# Patient Record
Sex: FEMALE | Race: BLACK OR AFRICAN AMERICAN | NOT HISPANIC OR LATINO | Employment: UNEMPLOYED | URBAN - METROPOLITAN AREA
[De-identification: names, ages, dates, MRNs, and addresses within clinical notes are randomized per-mention and may not be internally consistent; named-entity substitution may affect disease eponyms.]

---

## 2023-10-02 ENCOUNTER — HOSPITAL ENCOUNTER (EMERGENCY)
Facility: HOSPITAL | Age: 63
Discharge: HOME | End: 2023-10-02
Payer: COMMERCIAL

## 2023-10-02 ENCOUNTER — APPOINTMENT (OUTPATIENT)
Dept: RADIOLOGY | Facility: HOSPITAL | Age: 63
End: 2023-10-02
Payer: COMMERCIAL

## 2023-10-02 VITALS
RESPIRATION RATE: 16 BRPM | DIASTOLIC BLOOD PRESSURE: 95 MMHG | HEIGHT: 64 IN | WEIGHT: 178 LBS | TEMPERATURE: 98.2 F | SYSTOLIC BLOOD PRESSURE: 145 MMHG | HEART RATE: 63 BPM | BODY MASS INDEX: 30.39 KG/M2

## 2023-10-02 DIAGNOSIS — J06.9 VIRAL URI: Primary | ICD-10-CM

## 2023-10-02 LAB
APPEARANCE UR: CLEAR
BILIRUB UR STRIP.AUTO-MCNC: NEGATIVE MG/DL
COLOR UR: YELLOW
FLUAV RNA RESP QL NAA+PROBE: NOT DETECTED
FLUBV RNA RESP QL NAA+PROBE: NOT DETECTED
GLUCOSE UR STRIP.AUTO-MCNC: NEGATIVE MG/DL
KETONES UR STRIP.AUTO-MCNC: NEGATIVE MG/DL
LEUKOCYTE ESTERASE UR QL STRIP.AUTO: NEGATIVE
MUCOUS THREADS #/AREA URNS AUTO: NORMAL /LPF
NITRITE UR QL STRIP.AUTO: NEGATIVE
PH UR STRIP.AUTO: 5 [PH]
PROT UR STRIP.AUTO-MCNC: NEGATIVE MG/DL
RBC # UR STRIP.AUTO: ABNORMAL /UL
RBC #/AREA URNS AUTO: NORMAL /HPF
S PYO DNA THROAT QL NAA+PROBE: NOT DETECTED
SARS-COV-2 ORF1AB RESP QL NAA+PROBE: NOT DETECTED
SP GR UR STRIP.AUTO: 1.01
SQUAMOUS #/AREA URNS AUTO: NORMAL /HPF
UROBILINOGEN UR STRIP.AUTO-MCNC: <2 MG/DL
WBC #/AREA URNS AUTO: NORMAL /HPF

## 2023-10-02 PROCEDURE — 71046 X-RAY EXAM CHEST 2 VIEWS: CPT | Performed by: RADIOLOGY

## 2023-10-02 PROCEDURE — 2500000004 HC RX 250 GENERAL PHARMACY W/ HCPCS (ALT 636 FOR OP/ED): Mod: SE | Performed by: PHYSICIAN ASSISTANT

## 2023-10-02 PROCEDURE — 81001 URINALYSIS AUTO W/SCOPE: CPT | Performed by: EMERGENCY MEDICINE

## 2023-10-02 PROCEDURE — 99284 EMERGENCY DEPT VISIT MOD MDM: CPT

## 2023-10-02 PROCEDURE — 71046 X-RAY EXAM CHEST 2 VIEWS: CPT

## 2023-10-02 PROCEDURE — 99283 EMERGENCY DEPT VISIT LOW MDM: CPT | Mod: 25

## 2023-10-02 PROCEDURE — 87651 STREP A DNA AMP PROBE: CPT | Performed by: EMERGENCY MEDICINE

## 2023-10-02 PROCEDURE — 87635 SARS-COV-2 COVID-19 AMP PRB: CPT | Performed by: EMERGENCY MEDICINE

## 2023-10-02 PROCEDURE — 96372 THER/PROPH/DIAG INJ SC/IM: CPT

## 2023-10-02 RX ORDER — KETOROLAC TROMETHAMINE 15 MG/ML
15 INJECTION, SOLUTION INTRAMUSCULAR; INTRAVENOUS ONCE
Status: COMPLETED | OUTPATIENT
Start: 2023-10-02 | End: 2023-10-02

## 2023-10-02 RX ADMIN — KETOROLAC TROMETHAMINE 15 MG: 15 INJECTION, SOLUTION INTRAMUSCULAR; INTRAVENOUS at 14:07

## 2023-10-02 ASSESSMENT — PAIN SCALES - GENERAL: PAINLEVEL_OUTOF10: 7

## 2023-10-02 ASSESSMENT — PAIN - FUNCTIONAL ASSESSMENT: PAIN_FUNCTIONAL_ASSESSMENT: 0-10

## 2023-10-02 ASSESSMENT — COLUMBIA-SUICIDE SEVERITY RATING SCALE - C-SSRS
2. HAVE YOU ACTUALLY HAD ANY THOUGHTS OF KILLING YOURSELF?: NO
1. IN THE PAST MONTH, HAVE YOU WISHED YOU WERE DEAD OR WISHED YOU COULD GO TO SLEEP AND NOT WAKE UP?: NO
6. HAVE YOU EVER DONE ANYTHING, STARTED TO DO ANYTHING, OR PREPARED TO DO ANYTHING TO END YOUR LIFE?: NO

## 2023-10-02 NOTE — ED PROVIDER NOTES
I received Ny Martinez in signout from ED IVA.  Please see the previous note for all HPI, PE and MDM up to the time of signout at 1700.    In brief Ny Martinez is an 63 y.o. female presenting for Flulike symptoms for approximately 3 days.  She endorsed dry cough, fatigue, fever, chills, body aches headache and sore throat.  At the time of signout we were awaiting: Urinalysis. Urine was negative for acute infection. Gave patient's instructions To rest well, drink plenty of fluids and treat this as a viral URI.  Also instructed her to follow-up with PCP if symptoms do not improve within the next few days.    Please see previous providers note for full history and physical.   Chief Complaint   Patient presents with    Flu Symptoms     Reports cough, fatigue, fever       Results for orders placed or performed during the hospital encounter of 10/02/23 (from the past 24 hour(s))   Group A Streptococcus, PCR    Specimen: Throat/Pharynx; Swab   Result Value Ref Range    Group A Strep PCR Not Detected Not Detected   SARS-CoV-2 RT PCR   Result Value Ref Range    Coronavirus 2019, PCR Not Detected Not Detected   Influenza A, and B PCR   Result Value Ref Range    Flu A Result Not Detected Not Detected    Flu B Result Not Detected Not Detected   Urinalysis with Reflex Microscopic   Result Value Ref Range    Color, Urine Yellow Straw, Yellow    Appearance, Urine Clear Clear    Specific Gravity, Urine 1.014 1.005 - 1.035    pH, Urine 5.0 5.0, 5.5, 6.0, 6.5, 7.0, 7.5, 8.0    Protein, Urine NEGATIVE NEGATIVE mg/dL    Glucose, Urine NEGATIVE NEGATIVE mg/dL    Blood, Urine SMALL (1+) (A) NEGATIVE    Ketones, Urine NEGATIVE NEGATIVE mg/dL    Bilirubin, Urine NEGATIVE NEGATIVE    Urobilinogen, Urine <2.0 <2.0 mg/dL    Nitrite, Urine NEGATIVE NEGATIVE    Leukocyte Esterase, Urine NEGATIVE NEGATIVE   Urinalysis Microscopic Only   Result Value Ref Range    WBC, Urine NONE 1-5, NONE /HPF    RBC, Urine 1-2 NONE, 1-2, 3-5 /HPF     Squamous Epithelial Cells, Urine NONE Reference range not established. /HPF    Mucus, Urine 1+ Reference range not established. /LPF    XR chest 2 views    Result Date: 10/2/2023  Interpreted By:  Neri Guallpa and [place] Conner STUDY: XR CHEST 2 VIEWS;  10/2/2023 2:32 pm   INDICATION: Signs/Symptoms:flusymptoms, cough, shortness of  breath.   COMPARISON: 01/01/2023 single-view chest   ACCESSION NUMBER(S): [id number]   ORDERING CLINICIAN: LANDY AMADO   FINDINGS: PA and lateral radiographs of the chest were provided.   CARDIOMEDIASTINAL SILHOUETTE: Cardiomediastinal silhouette is normal in size and configuration.   LUNGS: No focal consolidation, pleural effusion, or pneumothorax. The right hemidiaphragm is elevated.   ABDOMEN: No remarkable upper abdominal findings.   BONES: No acute osseous changes.       1.  No evidence of acute cardiopulmonary process.   I personally reviewed the images/study and I agree with the findings as stated. This study was interpreted at [facility], [place], [place].   MACRO: None   Signed by: Neri Guallpa 10/2/2023 2:43 PM Dictation workstation:   [id number]          Pt Disposition: Discharge    Procedures       Landy Clemente PA-C  10/02/23 4921

## 2023-10-02 NOTE — ED PROVIDER NOTES
"HPI   Chief Complaint   Patient presents with    Flu Symptoms     Reports cough, fatigue, fever       HPI: Patient is a 63-year-old female with a history of hypertension who presents to the ED for flulike symptoms have been ongoing for 3 days.  Patient endorses dry cough, fatigue, fevers, chills, body aches, headache, sore throat.  States that it \"hurts to breathe\".  States that she has pain in her chest but is unable to state whether this is different from the rest of her body aches.  She also notes that she has been urinating more frequently than normal.  She denies any nausea, vomiting, diarrhea, abdominal pain, sick contacts.  States that she has been taking Tylenol, Motrin, throat lozenges without any relief.  ------------------------------------------------------------------------------------------------------------------------------------------  ROS: a ten point review of systems was performed and was negative except as per HPI.  ------------------------------------------------------------------------------------------------------------------------------------------  PMH / PSH: as per HPI, otherwise reviewed   MEDS: as per HPI, otherwise reviewed in EMR  ALLERGIES: as per HPI, otherwise reviewed in EMR  SocH:  as per HPI, otherwise reviewed in EMR  FH:  as per HPI, otherwise reviewed in EMR   ------------------------------------------------------------------------------------------------------------------------------------------  Physical Exam:  VS: As documented in the triage note and EMR flowsheet from this visit was reviewed  General: Well appearing. No acute distress.   Eyes:  Extraocular movements grossly intact. No scleral icterus.   Head: Atraumatic. Normocephalic.     Neck: No meningismus. No gross masses. Full movement through range of motion  ENT: Posterior oropharynx shows no erythema, exudate or edema.  Uvula is midline without edema.  No stridor or trismus  CV: Regular rhythm. No murmurs, rubs, " gallops appreciated.   Resp: Clear to auscultation bilaterally. No respiratory distress.    GI: Nontender. Soft. No masses. No rebound, rigidity or guarding.   MSK: Symmetric muscle bulk. No gross step offs or deformities.  Skin: Warm, dry. No rashes  Neuro: CN II-VII intact. A&O x3. Speech fluent. Alert. Moving all extremities. Ambulates with normal gait  Psych: Appropriate mood and affect for situation  ------------------------------------------------------------------------------------------------------------------------------------------  Hospital Course / Medical Decision Making: Patient is a 63-year-old female who presents to the ED for cold and flu symptoms that been ongoing for 3 days.  On examination, patient is well-appearing.  Vitals are stable.  Cardiopulmonary examination without any adventitious breath sounds.  Patient administered Toradol for pain control.  Chest x-ray, urinalysis, COVID and flu swabs obtained. Differential dx inludes Covid vs flu vs viral syndrome vs PNA vs UTI.  Given patients chest pain in the setting of viral illness feel this is likely the etiology of her symptoms. Low concern for ACS. Wells' score is 0.  At this time, patient's laboratory and imaging studies are pending at the end of my shift.  Patient will be signed out to the oncoming provider.  Please see the note for final results and disposition of the patient.                          No data recorded                Patient History   Past Medical History:   Diagnosis Date    Allergic rhinitis, unspecified 04/12/2018    Allergic sinusitis    Cellulitis of left upper limb 12/05/2018    Cellulitis of left upper extremity    Cellulitis, unspecified     Cellulitis    Disease of jaws, unspecified     Disorder of jaw    Disorder of pigmentation, unspecified 04/09/2018    Discoloration of skin of toe    Displaced fracture of fourth metatarsal bone, left foot, subsequent encounter for fracture with routine healing 05/10/2018     Closed displaced fracture of fourth metatarsal bone of left foot with routine healing, subsequent encounter    Displaced fracture of second metatarsal bone, left foot, subsequent encounter for fracture with routine healing 05/10/2018    Closed displaced fracture of second metatarsal bone of left foot with routine healing, subsequent encounter    Displaced fracture of third metatarsal bone, left foot, subsequent encounter for fracture with routine healing 05/10/2018    Closed displaced fracture of third metatarsal bone of left foot with routine healing, subsequent encounter    Effusion, left ankle 04/12/2018    Edema of left ankle    Encounter for follow-up examination after completed treatment for conditions other than malignant neoplasm 04/15/2019    Hospital discharge follow-up    Encounter for other screening for malignant neoplasm of breast 04/12/2018    Breast cancer screening    Encounter for screening for diabetes mellitus 02/16/2015    Screening for diabetes mellitus    Encounter for screening mammogram for malignant neoplasm of breast     Visit for screening mammogram    Nonscarring hair loss, unspecified 02/16/2015    Hair loss    Osteoarthritis of knee, unspecified     Osteoarthritis of knee    Pain in left foot 05/10/2018    Acute foot pain, left    Pain in unspecified hip 07/08/2015    Hip pain    Pain in unspecified limb     Limb pain    Pain, unspecified 01/15/2019    Total body pain    Personal history of other diseases of the digestive system 03/17/2017    History of gastroesophageal reflux (GERD)    Personal history of other endocrine, nutritional and metabolic disease 05/09/2018    History of obesity    Personal history of other specified conditions 07/17/2020    History of pituitary neoplasm    Personal history of other specified conditions 06/09/2017    History of chest pain    Personal history of other specified conditions 08/28/2015    History of palpitations    Personal history of other  specified conditions 05/10/2018    History of headache    Personal history of other specified conditions 08/27/2015    History of abdominal pain    Personal history of other specified conditions     History of fatigue    Personal history of other specified conditions     History of headache    Personal history of other specified conditions 08/28/2015    History of insomnia    Snoring     Snoring    Sprain of joints and ligaments of unspecified parts of neck, initial encounter 07/17/2013    Neck sprain    Unspecified fracture of left foot, initial encounter for closed fracture 09/12/2017    Foot fracture, left    Unspecified lump in the left breast, unspecified quadrant     Lump of left breast     Past Surgical History:   Procedure Laterality Date    APPENDECTOMY  07/17/2013    Appendectomy    CORONARY ARTERY BYPASS GRAFT  07/17/2013    Coronary Artery Surgery    MR HEAD ANGIO WO IV CONTRAST  8/6/2020    MR HEAD ANGIO WO IV CONTRAST 8/6/2020 [facility]    MR NECK ANGIO WO IV CONTRAST  8/6/2020    MR NECK ANGIO WO IV CONTRAST 8/6/2020 [facility]     No family history on file.  Social History     Tobacco Use    Smoking status: Not on file    Smokeless tobacco: Not on file   Substance Use Topics    Alcohol use: Not on file    Drug use: Not on file       Physical Exam   ED Triage Vitals [10/02/23 1147]   Temp Heart Rate Resp BP   36.8 °C (98.2 °F) 63 16 (!) 145/95      SpO2 Temp Source Heart Rate Source Patient Position   -- Oral Monitor Sitting      BP Location FiO2 (%)     Right arm --       Physical Exam    ED Course & MDM   Diagnoses as of 10/04/23 0812   Viral URI       Medical Decision Making      Procedure  Procedures     Landy Barkley PA-C  10/04/23 0816

## 2023-10-20 ENCOUNTER — HOSPITAL ENCOUNTER (EMERGENCY)
Facility: HOSPITAL | Age: 63
Discharge: HOME | End: 2023-10-21
Attending: EMERGENCY MEDICINE
Payer: COMMERCIAL

## 2023-10-20 ENCOUNTER — APPOINTMENT (OUTPATIENT)
Dept: RADIOLOGY | Facility: HOSPITAL | Age: 63
End: 2023-10-20
Payer: COMMERCIAL

## 2023-10-20 DIAGNOSIS — S29.019A THORACIC MYOFASCIAL STRAIN, INITIAL ENCOUNTER: Primary | ICD-10-CM

## 2023-10-20 LAB
ALBUMIN SERPL BCP-MCNC: 4 G/DL (ref 3.4–5)
ALP SERPL-CCNC: 79 U/L (ref 33–136)
ALT SERPL W P-5'-P-CCNC: 9 U/L (ref 7–45)
ANION GAP SERPL CALC-SCNC: 10 MMOL/L (ref 10–20)
AST SERPL W P-5'-P-CCNC: 12 U/L (ref 9–39)
BASOPHILS # BLD AUTO: 0.02 X10*3/UL (ref 0–0.1)
BASOPHILS NFR BLD AUTO: 0.4 %
BILIRUB SERPL-MCNC: 1.2 MG/DL (ref 0–1.2)
BUN SERPL-MCNC: 11 MG/DL (ref 6–23)
CALCIUM SERPL-MCNC: 9.3 MG/DL (ref 8.6–10.6)
CHLORIDE SERPL-SCNC: 104 MMOL/L (ref 98–107)
CO2 SERPL-SCNC: 30 MMOL/L (ref 21–32)
CREAT SERPL-MCNC: 0.43 MG/DL (ref 0.5–1.05)
D DIMER PPP FEU-MCNC: 332 NG/ML FEU
EOSINOPHIL # BLD AUTO: 0.15 X10*3/UL (ref 0–0.7)
EOSINOPHIL NFR BLD AUTO: 3 %
ERYTHROCYTE [DISTWIDTH] IN BLOOD BY AUTOMATED COUNT: 11.1 % (ref 11.5–14.5)
GFR SERPL CREATININE-BSD FRML MDRD: >90 ML/MIN/1.73M*2
GLUCOSE SERPL-MCNC: 87 MG/DL (ref 74–99)
HCT VFR BLD AUTO: 33.9 % (ref 36–46)
HGB BLD-MCNC: 11.6 G/DL (ref 12–16)
IMM GRANULOCYTES # BLD AUTO: 0.01 X10*3/UL (ref 0–0.7)
IMM GRANULOCYTES NFR BLD AUTO: 0.2 % (ref 0–0.9)
LYMPHOCYTES # BLD AUTO: 2.12 X10*3/UL (ref 1.2–4.8)
LYMPHOCYTES NFR BLD AUTO: 42.8 %
MAGNESIUM SERPL-MCNC: 2.28 MG/DL (ref 1.6–2.4)
MCH RBC QN AUTO: 30.7 PG (ref 26–34)
MCHC RBC AUTO-ENTMCNC: 34.2 G/DL (ref 32–36)
MCV RBC AUTO: 90 FL (ref 80–100)
MONOCYTES # BLD AUTO: 0.36 X10*3/UL (ref 0.1–1)
MONOCYTES NFR BLD AUTO: 7.3 %
NEUTROPHILS # BLD AUTO: 2.29 X10*3/UL (ref 1.2–7.7)
NEUTROPHILS NFR BLD AUTO: 46.3 %
NRBC BLD-RTO: 0 /100 WBCS (ref 0–0)
PLATELET # BLD AUTO: 254 X10*3/UL (ref 150–450)
PMV BLD AUTO: 9.9 FL (ref 7.5–11.5)
POTASSIUM SERPL-SCNC: 4.1 MMOL/L (ref 3.5–5.3)
PROT SERPL-MCNC: 7.1 G/DL (ref 6.4–8.2)
RBC # BLD AUTO: 3.78 X10*6/UL (ref 4–5.2)
SARS-COV-2 RNA RESP QL NAA+PROBE: NOT DETECTED
SODIUM SERPL-SCNC: 140 MMOL/L (ref 136–145)
WBC # BLD AUTO: 5 X10*3/UL (ref 4.4–11.3)

## 2023-10-20 PROCEDURE — 71046 X-RAY EXAM CHEST 2 VIEWS: CPT

## 2023-10-20 PROCEDURE — 36415 COLL VENOUS BLD VENIPUNCTURE: CPT | Performed by: EMERGENCY MEDICINE

## 2023-10-20 PROCEDURE — 99285 EMERGENCY DEPT VISIT HI MDM: CPT | Performed by: EMERGENCY MEDICINE

## 2023-10-20 PROCEDURE — 96374 THER/PROPH/DIAG INJ IV PUSH: CPT

## 2023-10-20 PROCEDURE — 81003 URINALYSIS AUTO W/O SCOPE: CPT | Performed by: EMERGENCY MEDICINE

## 2023-10-20 PROCEDURE — 2500000004 HC RX 250 GENERAL PHARMACY W/ HCPCS (ALT 636 FOR OP/ED): Mod: SE | Performed by: EMERGENCY MEDICINE

## 2023-10-20 PROCEDURE — 99284 EMERGENCY DEPT VISIT MOD MDM: CPT | Mod: 25

## 2023-10-20 PROCEDURE — 71046 X-RAY EXAM CHEST 2 VIEWS: CPT | Performed by: RADIOLOGY

## 2023-10-20 PROCEDURE — 80053 COMPREHEN METABOLIC PANEL: CPT | Mod: CMCLAB | Performed by: EMERGENCY MEDICINE

## 2023-10-20 PROCEDURE — 83735 ASSAY OF MAGNESIUM: CPT | Mod: CMCLAB | Performed by: EMERGENCY MEDICINE

## 2023-10-20 PROCEDURE — 2500000005 HC RX 250 GENERAL PHARMACY W/O HCPCS: Mod: SE | Performed by: EMERGENCY MEDICINE

## 2023-10-20 PROCEDURE — 85025 COMPLETE CBC W/AUTO DIFF WBC: CPT | Performed by: EMERGENCY MEDICINE

## 2023-10-20 PROCEDURE — 87635 SARS-COV-2 COVID-19 AMP PRB: CPT | Performed by: EMERGENCY MEDICINE

## 2023-10-20 PROCEDURE — 93010 ELECTROCARDIOGRAM REPORT: CPT | Performed by: EMERGENCY MEDICINE

## 2023-10-20 PROCEDURE — 85379 FIBRIN DEGRADATION QUANT: CPT | Mod: CMCLAB | Performed by: EMERGENCY MEDICINE

## 2023-10-20 RX ORDER — KETOROLAC TROMETHAMINE 15 MG/ML
15 INJECTION, SOLUTION INTRAMUSCULAR; INTRAVENOUS ONCE
Status: COMPLETED | OUTPATIENT
Start: 2023-10-20 | End: 2023-10-20

## 2023-10-20 RX ORDER — LIDOCAINE 560 MG/1
1 PATCH PERCUTANEOUS; TOPICAL; TRANSDERMAL DAILY
Status: DISCONTINUED | OUTPATIENT
Start: 2023-10-20 | End: 2023-10-21 | Stop reason: HOSPADM

## 2023-10-20 RX ADMIN — KETOROLAC TROMETHAMINE 15 MG: 15 INJECTION, SOLUTION INTRAMUSCULAR; INTRAVENOUS at 20:41

## 2023-10-20 RX ADMIN — LIDOCAINE 1 PATCH: 4 PATCH TOPICAL at 20:42

## 2023-10-20 ASSESSMENT — COLUMBIA-SUICIDE SEVERITY RATING SCALE - C-SSRS
1. IN THE PAST MONTH, HAVE YOU WISHED YOU WERE DEAD OR WISHED YOU COULD GO TO SLEEP AND NOT WAKE UP?: NO
6. HAVE YOU EVER DONE ANYTHING, STARTED TO DO ANYTHING, OR PREPARED TO DO ANYTHING TO END YOUR LIFE?: NO
2. HAVE YOU ACTUALLY HAD ANY THOUGHTS OF KILLING YOURSELF?: NO

## 2023-10-20 ASSESSMENT — PAIN - FUNCTIONAL ASSESSMENT: PAIN_FUNCTIONAL_ASSESSMENT: 0-10

## 2023-10-20 ASSESSMENT — PAIN SCALES - GENERAL
PAINLEVEL_OUTOF10: 8

## 2023-10-20 NOTE — ED PROVIDER NOTES
CC: Pain with Inspiration     HPI: Ny Martinez is an 63 y.o. 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 Denies any obvious inciting event. States it's mostly worse with inspiration. No alleviating factors and it's progressively worsening. Had cough and viral-like symptoms around the time of onset and was seen in the ED. Workup including swabs and CXR was unremarkable and she was discharged home. Those symptoms have resolved except this pain. Denies similar pain in the past. No history of VTE and not on estrogen containing products.     Limitations to History: none  Additional History Obtained from: none    PMHx/PSHx:  Per HPI.     Social History:  - Tobacco:  has no history on file for tobacco use.   - Alcohol:  has no history on file for alcohol use.   - Drugs:  has no history on file for drug use.     Medications: Reviewed in EMR.     Allergies:  Iodinated contrast media and Shellfish derived    ???????????????????????????????????????????????????????????????  Triage Vitals:  T 36.7 °C (98.1 °F)  HR 65  BP (!) 163/93  RR 16  O2 99 % None (Room air)    Physical Exam  Vitals and nursing note reviewed.   Constitutional:       Appearance: Normal appearance.   HENT:      Head: Normocephalic and atraumatic.      Nose: Nose normal.      Mouth/Throat:      Mouth: Mucous membranes are moist.   Cardiovascular:      Rate and Rhythm: Normal rate and regular rhythm.      Pulses: Normal pulses.   Pulmonary:      Effort: Pulmonary effort is normal.      Breath sounds: Normal breath sounds.   Abdominal:      General: There is no distension.      Palpations: Abdomen is soft.      Tenderness: There is no abdominal tenderness. There is no guarding or rebound.   Musculoskeletal:         General: Normal range of motion.      Comments: Mild tenderness to right mid-lower thoracic paraspinal muscles. No  overlying skin changes. No focal CVA tenderness.   Skin:     General: Skin is warm and dry.      Capillary Refill: Capillary refill takes less than 2 seconds.   Neurological:      General: No focal deficit present.      Mental Status: She is alert and oriented to person, place, and time.   Psychiatric:         Mood and Affect: Mood normal.         Behavior: Behavior normal.         Thought Content: Thought content normal.       ???????????????????????????????????????????????????????????????  EKG (per my interpretation):  Sinus bradycardia, rate 55, slight left axis deviation, normal intervals, no significant ST elevation/depression or T wave abnormality, poor R wave progression    ED Course/Medical Decision Makin y.o. female with history including hypertension, blindness in left eye s/p corneal replacement presenting to the emergency department for 2 weeks of right mid back pain, worse with inspiration. Had viral symptoms at time of onset and she was seen in the ED at that time. Today she is well-appearing, no distress. Ambulating without issue and mentating appropriately. She's neurovascularly intact. Exam is notable for right mid-lower thoracic paraspinal tenderness. No evidence of vesicular rash or other overlying skin changes. Given the pleuritic nature, PE considered. She appears to be low risk; unable to rule out by PERC given age but Well's score is low. However, with the progression of symptoms without other clear etiology, ddimer ordered. She was given analgesics. I have lower suspicion for dissection, ACS, fracture or other acute process. Labs are relatively unremarkable including ddimer. UA without signs of infection or blood. Chest x-ray clear. I suspect this is likely muscle strain. Do not feel that additional testing is indicated at this time. She was given prescriptions for multimodal analgesics, discussed return precautions and advised follow up with [facility] since it's going to  take a while to get in to see her PCP.     External records reviewed: recent inpatient, clinic, and prior ED notes  Diagnostic imaging independently reviewed/interpreted by me (as reflected in MDM) includes: EKG, labs, imaging   Discussion of management with other providers: n/a  Prescription Drug Consideration: analgesics  Escalation of Care: outpatient mgmt appropriate    Impression:   Thoracic muscle strain    Disposition: discharge    Signed by NIRMALA CMCORMICK MD Lauren R McCafferty, MD  10/22/23 0433

## 2023-10-21 VITALS
SYSTOLIC BLOOD PRESSURE: 133 MMHG | OXYGEN SATURATION: 92 % | BODY MASS INDEX: 30.37 KG/M2 | WEIGHT: 177.91 LBS | DIASTOLIC BLOOD PRESSURE: 75 MMHG | RESPIRATION RATE: 17 BRPM | HEART RATE: 56 BPM | TEMPERATURE: 98.1 F | HEIGHT: 64 IN

## 2023-10-21 LAB
APPEARANCE UR: CLEAR
BILIRUB UR STRIP.AUTO-MCNC: NEGATIVE MG/DL
COLOR UR: YELLOW
GLUCOSE UR STRIP.AUTO-MCNC: NEGATIVE MG/DL
KETONES UR STRIP.AUTO-MCNC: NEGATIVE MG/DL
LEUKOCYTE ESTERASE UR QL STRIP.AUTO: NEGATIVE
NITRITE UR QL STRIP.AUTO: NEGATIVE
PH UR STRIP.AUTO: 7 [PH]
PROT UR STRIP.AUTO-MCNC: NEGATIVE MG/DL
RBC # UR STRIP.AUTO: NEGATIVE /UL
SP GR UR STRIP.AUTO: 1.01
UROBILINOGEN UR STRIP.AUTO-MCNC: <2 MG/DL

## 2023-10-21 PROCEDURE — 2500000001 HC RX 250 WO HCPCS SELF ADMINISTERED DRUGS (ALT 637 FOR MEDICARE OP): Mod: SE | Performed by: EMERGENCY MEDICINE

## 2023-10-21 RX ORDER — CYCLOBENZAPRINE HCL 10 MG
10 TABLET ORAL ONCE
Status: COMPLETED | OUTPATIENT
Start: 2023-10-21 | End: 2023-10-21

## 2023-10-21 RX ORDER — METHOCARBAMOL 500 MG/1
500 TABLET, FILM COATED ORAL 3 TIMES DAILY
Qty: 21 TABLET | Refills: 0 | Status: SHIPPED | OUTPATIENT
Start: 2023-10-21 | End: 2023-10-28

## 2023-10-21 RX ORDER — IBUPROFEN 600 MG/1
600 TABLET ORAL EVERY 8 HOURS PRN
Qty: 30 TABLET | Refills: 0 | Status: SHIPPED | OUTPATIENT
Start: 2023-10-21

## 2023-10-21 RX ORDER — ACETAMINOPHEN 325 MG/1
650 TABLET ORAL EVERY 6 HOURS PRN
Qty: 30 TABLET | Refills: 0 | Status: SHIPPED | OUTPATIENT
Start: 2023-10-21

## 2023-10-21 RX ORDER — ACETAMINOPHEN 325 MG/1
975 TABLET ORAL ONCE
Status: COMPLETED | OUTPATIENT
Start: 2023-10-21 | End: 2023-10-21

## 2023-10-21 RX ORDER — LIDOCAINE 50 MG/G
1 PATCH TOPICAL DAILY
Qty: 5 PATCH | Refills: 0 | Status: SHIPPED | OUTPATIENT
Start: 2023-10-21

## 2023-10-21 RX ADMIN — ACETAMINOPHEN 975 MG: 325 TABLET ORAL at 02:43

## 2023-10-21 RX ADMIN — CYCLOBENZAPRINE 10 MG: 10 TABLET, FILM COATED ORAL at 02:43

## 2023-10-21 ASSESSMENT — LIFESTYLE VARIABLES
HAVE YOU EVER FELT YOU SHOULD CUT DOWN ON YOUR DRINKING: NO
REASON UNABLE TO ASSESS: NO
EVER FELT BAD OR GUILTY ABOUT YOUR DRINKING: NO
HAVE PEOPLE ANNOYED YOU BY CRITICIZING YOUR DRINKING: NO
EVER HAD A DRINK FIRST THING IN THE MORNING TO STEADY YOUR NERVES TO GET RID OF A HANGOVER: NO

## 2023-10-21 NOTE — DISCHARGE INSTRUCTIONS
You were seen in the ED for back pain. This is likely a muscle strain. Your workup did not reveal an obvious cause. Monitor symptoms closely. Take medications as prescribed. Follow up with your primary doctor or the [facility] clinic.

## 2023-10-26 LAB — HOLD SPECIMEN: NORMAL

## 2023-10-30 PROCEDURE — 99285 EMERGENCY DEPT VISIT HI MDM: CPT | Mod: 25

## 2023-10-30 PROCEDURE — 99284 EMERGENCY DEPT VISIT MOD MDM: CPT | Performed by: EMERGENCY MEDICINE

## 2023-10-30 PROCEDURE — 99285 EMERGENCY DEPT VISIT HI MDM: CPT | Performed by: EMERGENCY MEDICINE

## 2023-10-30 PROCEDURE — 96372 THER/PROPH/DIAG INJ SC/IM: CPT

## 2023-10-30 ASSESSMENT — COLUMBIA-SUICIDE SEVERITY RATING SCALE - C-SSRS
1. IN THE PAST MONTH, HAVE YOU WISHED YOU WERE DEAD OR WISHED YOU COULD GO TO SLEEP AND NOT WAKE UP?: NO
2. HAVE YOU ACTUALLY HAD ANY THOUGHTS OF KILLING YOURSELF?: NO

## 2023-10-30 ASSESSMENT — PAIN - FUNCTIONAL ASSESSMENT: PAIN_FUNCTIONAL_ASSESSMENT: 0-10

## 2023-10-30 ASSESSMENT — PAIN DESCRIPTION - PAIN TYPE: TYPE: ACUTE PAIN

## 2023-10-30 ASSESSMENT — PAIN SCALES - GENERAL: PAINLEVEL_OUTOF10: 7

## 2023-10-30 ASSESSMENT — PAIN DESCRIPTION - LOCATION: LOCATION: CHEST

## 2023-10-31 ENCOUNTER — CLINICAL SUPPORT (OUTPATIENT)
Dept: EMERGENCY MEDICINE | Facility: HOSPITAL | Age: 63
End: 2023-10-31
Payer: COMMERCIAL

## 2023-10-31 ENCOUNTER — HOSPITAL ENCOUNTER (EMERGENCY)
Facility: HOSPITAL | Age: 63
Discharge: HOME | End: 2023-10-31
Attending: EMERGENCY MEDICINE
Payer: COMMERCIAL

## 2023-10-31 ENCOUNTER — APPOINTMENT (OUTPATIENT)
Dept: RADIOLOGY | Facility: HOSPITAL | Age: 63
End: 2023-10-31
Payer: COMMERCIAL

## 2023-10-31 VITALS
RESPIRATION RATE: 18 BRPM | WEIGHT: 174 LBS | OXYGEN SATURATION: 98 % | TEMPERATURE: 97.8 F | HEIGHT: 64 IN | HEART RATE: 74 BPM | BODY MASS INDEX: 29.71 KG/M2 | DIASTOLIC BLOOD PRESSURE: 71 MMHG | SYSTOLIC BLOOD PRESSURE: 134 MMHG

## 2023-10-31 DIAGNOSIS — G89.29 CHRONIC BILATERAL LOW BACK PAIN WITHOUT SCIATICA: Primary | ICD-10-CM

## 2023-10-31 DIAGNOSIS — M54.50 CHRONIC BILATERAL LOW BACK PAIN WITHOUT SCIATICA: Primary | ICD-10-CM

## 2023-10-31 PROBLEM — S39.012A LUMBAR STRAIN: Status: ACTIVE | Noted: 2023-10-31

## 2023-10-31 PROBLEM — M19.079 ARTHRITIS OF FOOT: Status: ACTIVE | Noted: 2023-10-31

## 2023-10-31 PROBLEM — K76.0 NAFLD (NONALCOHOLIC FATTY LIVER DISEASE): Status: ACTIVE | Noted: 2023-10-31

## 2023-10-31 PROBLEM — E55.9 VITAMIN D DEFICIENCY: Status: ACTIVE | Noted: 2023-10-31

## 2023-10-31 PROBLEM — G47.33 OBSTRUCTIVE SLEEP APNEA OF ADULT: Status: ACTIVE | Noted: 2023-10-31

## 2023-10-31 PROBLEM — I35.9 AORTIC VALVE CALCIFICATION: Status: ACTIVE | Noted: 2023-10-31

## 2023-10-31 PROBLEM — D35.2 PROLACTINOMA, BENIGN (MULTI): Status: ACTIVE | Noted: 2023-10-31

## 2023-10-31 PROBLEM — E88.819 INSULIN RESISTANCE: Status: ACTIVE | Noted: 2023-10-31

## 2023-10-31 PROBLEM — I10 HYPERTENSION: Status: ACTIVE | Noted: 2019-02-08

## 2023-10-31 PROBLEM — R93.1 ABNORMAL ECHOCARDIOGRAM: Status: ACTIVE | Noted: 2023-10-31

## 2023-10-31 PROBLEM — L30.9 ECZEMA: Status: ACTIVE | Noted: 2023-10-31

## 2023-10-31 PROBLEM — M25.561 CHRONIC PAIN OF RIGHT KNEE: Status: ACTIVE | Noted: 2023-10-31

## 2023-10-31 LAB
ALBUMIN SERPL BCP-MCNC: 4 G/DL (ref 3.4–5)
ALP SERPL-CCNC: 70 U/L (ref 33–136)
ALT SERPL W P-5'-P-CCNC: 11 U/L (ref 7–45)
ANION GAP SERPL CALC-SCNC: 13 MMOL/L (ref 10–20)
APPEARANCE UR: ABNORMAL
APPEARANCE UR: ABNORMAL
AST SERPL W P-5'-P-CCNC: 15 U/L (ref 9–39)
ATRIAL RATE: 76 BPM
BACTERIA #/AREA URNS AUTO: ABNORMAL /HPF
BASOPHILS # BLD AUTO: 0.04 X10*3/UL (ref 0–0.1)
BASOPHILS NFR BLD AUTO: 0.9 %
BILIRUB SERPL-MCNC: 1.1 MG/DL (ref 0–1.2)
BILIRUB UR STRIP.AUTO-MCNC: NEGATIVE MG/DL
BILIRUB UR STRIP.AUTO-MCNC: NEGATIVE MG/DL
BUN SERPL-MCNC: 13 MG/DL (ref 6–23)
CALCIUM SERPL-MCNC: 9.1 MG/DL (ref 8.6–10.6)
CARDIAC TROPONIN I PNL SERPL HS: 4 NG/L (ref 0–34)
CHLORIDE SERPL-SCNC: 105 MMOL/L (ref 98–107)
CO2 SERPL-SCNC: 26 MMOL/L (ref 21–32)
COLOR UR: YELLOW
COLOR UR: YELLOW
CREAT SERPL-MCNC: 0.49 MG/DL (ref 0.5–1.05)
EOSINOPHIL # BLD AUTO: 0.2 X10*3/UL (ref 0–0.7)
EOSINOPHIL NFR BLD AUTO: 4.3 %
ERYTHROCYTE [DISTWIDTH] IN BLOOD BY AUTOMATED COUNT: 11.1 % (ref 11.5–14.5)
GFR SERPL CREATININE-BSD FRML MDRD: >90 ML/MIN/1.73M*2
GLUCOSE SERPL-MCNC: 90 MG/DL (ref 74–99)
GLUCOSE UR STRIP.AUTO-MCNC: ABNORMAL MG/DL
GLUCOSE UR STRIP.AUTO-MCNC: NEGATIVE MG/DL
HCT VFR BLD AUTO: 34.8 % (ref 36–46)
HGB BLD-MCNC: 12.1 G/DL (ref 12–16)
HOLD SPECIMEN: NORMAL
HYALINE CASTS #/AREA URNS AUTO: ABNORMAL /LPF
HYALINE CASTS #/AREA URNS AUTO: ABNORMAL /LPF
IMM GRANULOCYTES # BLD AUTO: 0.01 X10*3/UL (ref 0–0.7)
IMM GRANULOCYTES NFR BLD AUTO: 0.2 % (ref 0–0.9)
KETONES UR STRIP.AUTO-MCNC: NEGATIVE MG/DL
KETONES UR STRIP.AUTO-MCNC: NEGATIVE MG/DL
LEUKOCYTE ESTERASE UR QL STRIP.AUTO: NEGATIVE
LEUKOCYTE ESTERASE UR QL STRIP.AUTO: NEGATIVE
LYMPHOCYTES # BLD AUTO: 2.02 X10*3/UL (ref 1.2–4.8)
LYMPHOCYTES NFR BLD AUTO: 43.4 %
MCH RBC QN AUTO: 30.9 PG (ref 26–34)
MCHC RBC AUTO-ENTMCNC: 34.8 G/DL (ref 32–36)
MCV RBC AUTO: 89 FL (ref 80–100)
MONOCYTES # BLD AUTO: 0.37 X10*3/UL (ref 0.1–1)
MONOCYTES NFR BLD AUTO: 8 %
MUCOUS THREADS #/AREA URNS AUTO: ABNORMAL /LPF
MUCOUS THREADS #/AREA URNS AUTO: ABNORMAL /LPF
NEUTROPHILS # BLD AUTO: 2.01 X10*3/UL (ref 1.2–7.7)
NEUTROPHILS NFR BLD AUTO: 43.2 %
NITRITE UR QL STRIP.AUTO: NEGATIVE
NITRITE UR QL STRIP.AUTO: POSITIVE
NRBC BLD-RTO: 0 /100 WBCS (ref 0–0)
P AXIS: 72 DEGREES
P OFFSET: 187 MS
P ONSET: 130 MS
PH UR STRIP.AUTO: 5 [PH]
PH UR STRIP.AUTO: 5 [PH]
PLATELET # BLD AUTO: 238 X10*3/UL (ref 150–450)
PMV BLD AUTO: 9.6 FL (ref 7.5–11.5)
POTASSIUM SERPL-SCNC: 3.9 MMOL/L (ref 3.5–5.3)
PR INTERVAL: 176 MS
PROT SERPL-MCNC: 8 G/DL (ref 6.4–8.2)
PROT UR STRIP.AUTO-MCNC: ABNORMAL MG/DL
PROT UR STRIP.AUTO-MCNC: NEGATIVE MG/DL
Q ONSET: 218 MS
QRS COUNT: 12 BEATS
QRS DURATION: 88 MS
QT INTERVAL: 404 MS
QTC CALCULATION(BAZETT): 454 MS
QTC FREDERICIA: 436 MS
R AXIS: 130 DEGREES
RBC # BLD AUTO: 3.92 X10*6/UL (ref 4–5.2)
RBC # UR STRIP.AUTO: ABNORMAL /UL
RBC # UR STRIP.AUTO: ABNORMAL /UL
RBC #/AREA URNS AUTO: ABNORMAL /HPF
RBC #/AREA URNS AUTO: ABNORMAL /HPF
SODIUM SERPL-SCNC: 140 MMOL/L (ref 136–145)
SP GR UR STRIP.AUTO: 1.01
SP GR UR STRIP.AUTO: 1.02
SQUAMOUS #/AREA URNS AUTO: ABNORMAL /HPF
SQUAMOUS #/AREA URNS AUTO: ABNORMAL /HPF
T AXIS: -7 DEGREES
T OFFSET: 420 MS
TSH SERPL-ACNC: 1.5 MIU/L (ref 0.44–3.98)
UROBILINOGEN UR STRIP.AUTO-MCNC: 2 MG/DL
UROBILINOGEN UR STRIP.AUTO-MCNC: <2 MG/DL
VENTRICULAR RATE: 76 BPM
WBC # BLD AUTO: 4.7 X10*3/UL (ref 4.4–11.3)
WBC #/AREA URNS AUTO: ABNORMAL /HPF
WBC #/AREA URNS AUTO: ABNORMAL /HPF

## 2023-10-31 PROCEDURE — 36415 COLL VENOUS BLD VENIPUNCTURE: CPT | Performed by: EMERGENCY MEDICINE

## 2023-10-31 PROCEDURE — 81001 URINALYSIS AUTO W/SCOPE: CPT | Mod: 59 | Performed by: EMERGENCY MEDICINE

## 2023-10-31 PROCEDURE — 74176 CT ABD & PELVIS W/O CONTRAST: CPT | Mod: FR,MG

## 2023-10-31 PROCEDURE — 2500000004 HC RX 250 GENERAL PHARMACY W/ HCPCS (ALT 636 FOR OP/ED): Mod: SE

## 2023-10-31 PROCEDURE — 84484 ASSAY OF TROPONIN QUANT: CPT | Performed by: EMERGENCY MEDICINE

## 2023-10-31 PROCEDURE — 81001 URINALYSIS AUTO W/SCOPE: CPT

## 2023-10-31 PROCEDURE — 87086 URINE CULTURE/COLONY COUNT: CPT | Performed by: EMERGENCY MEDICINE

## 2023-10-31 PROCEDURE — 96372 THER/PROPH/DIAG INJ SC/IM: CPT

## 2023-10-31 PROCEDURE — 85025 COMPLETE CBC W/AUTO DIFF WBC: CPT | Performed by: EMERGENCY MEDICINE

## 2023-10-31 PROCEDURE — 74176 CT ABD & PELVIS W/O CONTRAST: CPT | Mod: FOREIGN READ | Performed by: RADIOLOGY

## 2023-10-31 PROCEDURE — 93005 ELECTROCARDIOGRAM TRACING: CPT

## 2023-10-31 PROCEDURE — 80053 COMPREHEN METABOLIC PANEL: CPT | Performed by: EMERGENCY MEDICINE

## 2023-10-31 PROCEDURE — 84443 ASSAY THYROID STIM HORMONE: CPT

## 2023-10-31 PROCEDURE — 81001 URINALYSIS AUTO W/SCOPE: CPT | Performed by: EMERGENCY MEDICINE

## 2023-10-31 RX ORDER — KETOROLAC TROMETHAMINE 15 MG/ML
INJECTION, SOLUTION INTRAMUSCULAR; INTRAVENOUS
Status: COMPLETED
Start: 2023-10-31 | End: 2023-10-31

## 2023-10-31 RX ORDER — CYCLOBENZAPRINE HCL 10 MG
10 TABLET ORAL 3 TIMES DAILY PRN
Qty: 10 TABLET | Refills: 0 | Status: SHIPPED | OUTPATIENT
Start: 2023-10-31

## 2023-10-31 RX ORDER — KETOROLAC TROMETHAMINE 15 MG/ML
15 INJECTION, SOLUTION INTRAMUSCULAR; INTRAVENOUS ONCE
Status: COMPLETED | OUTPATIENT
Start: 2023-10-31 | End: 2023-10-31

## 2023-10-31 RX ORDER — PREDNISONE 20 MG/1
40 TABLET ORAL ONCE
Status: COMPLETED | OUTPATIENT
Start: 2023-10-31 | End: 2023-10-31

## 2023-10-31 RX ORDER — PREDNISONE 20 MG/1
TABLET ORAL
Status: COMPLETED
Start: 2023-10-31 | End: 2023-10-31

## 2023-10-31 RX ADMIN — KETOROLAC TROMETHAMINE 15 MG: 15 INJECTION, SOLUTION INTRAMUSCULAR; INTRAVENOUS at 08:26

## 2023-10-31 RX ADMIN — PREDNISONE 40 MG: 20 TABLET ORAL at 08:50

## 2023-10-31 ASSESSMENT — PAIN SCALES - GENERAL: PAINLEVEL_OUTOF10: 0 - NO PAIN

## 2023-10-31 NOTE — PROGRESS NOTES
This patient was signed out to me by outgoing provider.  Please see their note for initial patient presentation and work-up.  In brief, this is a 63-year-old female with history of HTN, NAFLD, ROHITH, and recent severe back pain who initially presented for chief complaint of back pain with headache.      Diagnostic testing was in progress at the time of shift change.  CMP, CBC with differential , Troponin unremarkable.  Initial documented urinalysis was mislabeled and the wrong patient.  Most recent urinalysis shows moderate blood but otherwise unremarkable.  No evidence of UTI.  CT abdomen pelvis without contrast showed no acute abdominal or pelvic process.  There was a 1 mm nonobstructing right renal stone but no findings of hydronephrosis or hydroureter.  No ureteral stones are identified.  Pelvic vascular calcifications are also visible.  Spondylosis noted with mild degenerative anterolisthesis of L3 on L4 and L4 on L5.  No fractures or dislocations.      On reevaluation the patient endorses still feeling some discomfort.  Mostly in the back but also a headache.  Denies vision changes or syncope or dizziness.  Back pain unchanged.  She would not let me examine her, including palpating the back area or abdominal area.  Given Toradol.  We discussed changing medication and quick follow-up with physician.  Prescribed prednisone and cyclobenzaprine and advised to stop the Robaxin and continue taking Tylenol/Motrin as needed for pain control.  Lidocaine patches also refilled.  Advised to return to the ED with any new or worsening symptoms.  Patient in agreement with this plan.  Discharged in stable condition.    Comprehensive metabolic panel        Component Value Flag Ref Range Units Status    Glucose 90      74 - 99 mg/dL Final    Sodium 140      136 - 145 mmol/L Final    Potassium 3.9      3.5 - 5.3 mmol/L Final    Chloride 105      98 - 107 mmol/L Final    Bicarbonate 26      21 - 32 mmol/L Final    Anion Gap 13       10 - 20 mmol/L Final    Urea Nitrogen 13      6 - 23 mg/dL Final    Creatinine 0.49      0.50 - 1.05 mg/dL Final    eGFR >90      >60 mL/min/1.73m*2 Final    Comment:    Calculations of estimated GFR are performed using the 2021 CKD-EPI Study Refit equation without the race variable for the IDMS-Traceable creatinine methods.  https://jasn.asnjournals.org/content/early/2021/09/22/ASN.9580553294    Calcium 9.1      8.6 - 10.6 mg/dL Final    Albumin 4.0      3.4 - 5.0 g/dL Final    Alkaline Phosphatase 70      33 - 136 U/L Final    Total Protein 8.0      6.4 - 8.2 g/dL Final    AST 15      9 - 39 U/L Final    Bilirubin, Total 1.1      0.0 - 1.2 mg/dL Final    ALT 11      7 - 45 U/L Final    Comment:    Patients treated with Sulfasalazine may generate falsely decreased results for ALT.                  CBC and Auto Differential        Component Value Flag Ref Range Units Status    WBC 4.7      4.4 - 11.3 x10*3/uL Final    nRBC 0.0      0.0 - 0.0 /100 WBCs Final    RBC 3.92      4.00 - 5.20 x10*6/uL Final    Hemoglobin 12.1      12.0 - 16.0 g/dL Final    Hematocrit 34.8      36.0 - 46.0 % Final    MCV 89      80 - 100 fL Final    MCH 30.9      26.0 - 34.0 pg Final    MCHC 34.8      32.0 - 36.0 g/dL Final    RDW 11.1      11.5 - 14.5 % Final    Platelets 238      150 - 450 x10*3/uL Final    MPV 9.6      7.5 - 11.5 fL Final    Neutrophils % 43.2      40.0 - 80.0 % Final    Immature Granulocytes %, Automated 0.2      0.0 - 0.9 % Final    Comment:    Immature Granulocyte Count (IG) includes promyelocytes, myelocytes and metamyelocytes but does not include bands. Percent differential counts (%) should be interpreted in the context of the absolute cell counts (cells/UL).    Lymphocytes % 43.4      13.0 - 44.0 % Final    Monocytes % 8.0      2.0 - 10.0 % Final    Eosinophils % 4.3      0.0 - 6.0 % Final    Basophils % 0.9      0.0 - 2.0 % Final    Neutrophils Absolute 2.01      1.20 - 7.70 x10*3/uL Final    Comment:    Percent  differential counts (%) should be interpreted in the context of the absolute cell counts (cells/uL).    Immature Granulocytes Absolute, Automated 0.01      0.00 - 0.70 x10*3/uL Final    Lymphocytes Absolute 2.02      1.20 - 4.80 x10*3/uL Final    Monocytes Absolute 0.37      0.10 - 1.00 x10*3/uL Final    Eosinophils Absolute 0.20      0.00 - 0.70 x10*3/uL Final    Basophils Absolute 0.04      0.00 - 0.10 x10*3/uL Final                  ECG 12 lead        Component Value Flag Ref Range Units Status    Ventricular Rate 76       BPM Final    Atrial Rate 76       BPM Final    IN Interval 176       ms Final    QRS Duration 88       ms Final    QT Interval 404       ms Final    QTC Calculation(Bazett) 454       ms Final    P Axis 72       degrees Final    R Axis 130       degrees Final    T Axis -7       degrees Final    QRS Count 12       beats Final    Q Onset 218       ms Final    P Onset 130       ms Final    P Offset 187       ms Final    T Offset 420       ms Final    QTC Fredericia 436       ms Final                 Please see ED Provider Note for formal interpretation    Confirmed by Kristi Mancuso ([id number]) on 10/31/2023 3:58:42 AM         Troponin I, High Sensitivity        Component Value Flag Ref Range Units Status    Troponin I, High Sensitivity 4      0 - 34 ng/L Final                  Urinalysis with Reflex Microscopic        Component Value Flag Ref Range Units Status    Color, Urine Yellow      Straw, Yellow  Final    Appearance, Urine Hazy   (Normal)   Clear  Final    Specific Gravity, Urine 1.013      1.005 - 1.035  Final    pH, Urine 5.0      5.0, 5.5, 6.0, 6.5, 7.0, 7.5, 8.0  Final    Protein, Urine NEGATIVE      NEGATIVE mg/dL Final    Glucose, Urine NEGATIVE      NEGATIVE mg/dL Final    Blood, Urine SMALL (1+)      NEGATIVE  Final    Ketones, Urine NEGATIVE      NEGATIVE mg/dL Final    Bilirubin, Urine NEGATIVE      NEGATIVE  Final    Urobilinogen, Urine 2.0   (Normal)   <2.0 mg/dL Final    Comment:     Due to a manufacturing issue, low positive urobilinogen results may be falsely positive. Correlate with urine bilirubin and additional clinical/laboratory findings to assess the risk of hemolytic anemia or liver disease. If clinically indicated, repeat testing with an alternate method is available by contacting the laboratory within 24 hours.    Some pigments and medications may cause a false positive urobilinogen.    Nitrite, Urine POSITIVE      NEGATIVE  Final    Leukocyte Esterase, Urine NEGATIVE      NEGATIVE  Final                  Microscopic Only, Urine        Component Value Flag Ref Range Units Status    WBC, Urine 1-5      1-5, NONE /HPF Final    RBC, Urine 1-2      NONE, 1-2, 3-5 /HPF Final    Squamous Epithelial Cells, Urine 1-9 (SPARSE)      Reference range not established. /HPF Final    Bacteria, Urine 4+      NONE SEEN /HPF Final    Mucus, Urine 2+      Reference range not established. /LPF Final    Hyaline Casts, Urine 1+      NONE /LPF Final                  Urinalysis with Reflex Microscopic and Culture        Component Value Flag Ref Range Units Status    Color, Urine Yellow      Straw, Yellow  Final    Appearance, Urine Hazy   (Normal)   Clear  Final    Specific Gravity, Urine 1.019      1.005 - 1.035  Final    pH, Urine 5.0      5.0, 5.5, 6.0, 6.5, 7.0, 7.5, 8.0  Final    Protein, Urine 30 (1+)   (Normal)   NEGATIVE mg/dL Final    Glucose, Urine 50 (1+)      NEGATIVE mg/dL Final    Blood, Urine MODERATE (2+)      NEGATIVE  Final    Ketones, Urine NEGATIVE      NEGATIVE mg/dL Final    Bilirubin, Urine NEGATIVE      NEGATIVE  Final    Urobilinogen, Urine <2.0      <2.0 mg/dL Final    Nitrite, Urine NEGATIVE      NEGATIVE  Final    Leukocyte Esterase, Urine NEGATIVE      NEGATIVE  Final                  Extra Urine Gray Tube        Component    Extra Tube    Hold for add-ons.      Comment:    Auto resulted.                  Urinalysis Microscopic        Component Value Flag Ref Range Units Status     WBC, Urine 1-5      1-5, NONE /HPF Final    RBC, Urine 6-10      NONE, 1-2, 3-5 /HPF Final    Squamous Epithelial Cells, Urine 1-9 (SPARSE)      Reference range not established. /HPF Final    Mucus, Urine 1+      Reference range not established. /LPF Final    Hyaline Casts, Urine 1+      NONE /LPF Final                  CT abdomen pelvis wo IV contrast          STUDY:  CT Abdomen and Pelvis without IV Contrast; 10/31/2023, 7:40AM.  INDICATION:  Right flank pain for 3 weeks.  COMPARISON:  None.  ACCESSION NUMBER(S):  [id number]  TECHNIQUE:  CT of the abdomen and pelvis was performed.  Contiguous axial images  were obtained at 3 mm slice thickness through the abdomen and pelvis.   Coronal and sagittal reconstructions at 3 mm slice thickness were  performed. No intravenous contrast was administered.    Automated mA/kV exposure control was utilized and patient examination  was performed in strict accordance with principles of ALARA.  FINDINGS:  Please note that the evaluation of vessels, lymph nodes and organs is  limited without intravenous contrast.      LOWER CHEST:  No cardiomegaly.  No pericardial effusion.  Lung bases are clear.     ABDOMEN:  Mild cardiac enlargement. No pericardial effusion. Lung bases are  clear.  Unenhanced liver of normal size and contour. Gallbladder of normal  appearance. No acute abnormality of the pancreas or spleen. Adrenal  glands of normal appearance.   1 mm nonobstructing RIGHT renal stone. No acute renal abnormality  identified.   No findings of abdominal aortic aneurysm. No retroperitoneal  adenopathy.  No free fluid or free air within the abdomen. No bowel obstruction.  The appendix is not visualized. No acute abnormality within its  expected location.  No free fluid or free air within the pelvis. No pelvic adenopathy.   Spondylotic changes and facet arthrosis. Mild degenerative  anterolisthesis of L3 on L4 and L4 on L5. No findings of sacral  fracture. No hip fracture. No  dislocation. No pelvic fracture.  IMPRESSION:  Impression:  No findings of an acute abdominal or pelvic process.  1 mm nonobstructing RIGHT renal stone. No findings of hydronephrosis  or hydroureter. No ureteral stones are identified. Pelvic vascular  calcifications.  Mild cardiac enlargement.  Spondylotic changes and facet arthrosis. Mild degenerative  anterolisthesis of L3 on L4 and L4 on L5. No findings of sacral  fracture. No hip fracture. No dislocation. No pelvic fracture.  Signed by Jc Garcia MD            [unfilled]     There are no discontinued medications.

## 2023-10-31 NOTE — ED TRIAGE NOTES
Pt presenting to ED with c/o SOB, palpitations x 3 weeks. Pt reports it is painful with inspiration.     Pt also endorses urinary frequency.

## 2023-10-31 NOTE — ED PROVIDER NOTES
CC: Shortness of Breath, Palpitations, and Urinary Frequency     HPI:  Patient is a 63-year-old female with history of hypertension and left eye blindness after corneal transplant who is presenting for evaluation of 3 weeks of shortness of breath palpitations and Dysuria.  Of note she has been seen in the ED multiple times over the last 3 weeks.  She denies any chest pain cough congestion fevers chills or wheezing.  She is not using any inhalers at home.  She is not a smoker.  She denies new sexual partners, urinary frequency or hematuria.    Records Reviewed:  Recent available ED and inpatient notes reviewed in EMR.    PMHx/PSHx:  Per HPI.     Medications:  Reviewed in EMR. See EMR for complete list of medications and doses.    Allergies:  Iodinated contrast media and Shellfish derived    Social History:  - Tobacco:  reports that she has never smoked. She has never used smokeless tobacco.   - Alcohol:  reports current alcohol use.   - Illicit Drugs:  reports no history of drug use.     ROS:  Per HPI.     Physical Exam  Vitals and nursing note reviewed.   Constitutional:       General: She is not in acute distress.     Appearance: She is well-developed and normal weight. She is not ill-appearing or diaphoretic.   HENT:      Head: Normocephalic and atraumatic.      Mouth/Throat:      Mouth: Mucous membranes are moist.      Pharynx: Oropharynx is clear.   Eyes:      Extraocular Movements: Extraocular movements intact.      Conjunctiva/sclera: Conjunctivae normal.   Cardiovascular:      Rate and Rhythm: Normal rate and regular rhythm.      Pulses: Normal pulses.      Heart sounds: No murmur heard.  Pulmonary:      Effort: Pulmonary effort is normal. No tachypnea or respiratory distress.      Breath sounds: Normal breath sounds. No decreased breath sounds.   Chest:      Chest wall: No mass or tenderness.   Abdominal:      Palpations: Abdomen is soft.      Tenderness: There is no abdominal tenderness.   Musculoskeletal:          General: No swelling. Normal range of motion.      Cervical back: Neck supple.      Right lower leg: No edema.      Left lower leg: No edema.   Skin:     General: Skin is warm and dry.      Capillary Refill: Capillary refill takes less than 2 seconds.   Neurological:      General: No focal deficit present.      Mental Status: She is alert and oriented to person, place, and time.   Psychiatric:         Mood and Affect: Mood normal.       EKG:  Heart rate 76 bpm  AK interval Puja 176 MS QRS 88 MS QTc 454 MS within normal limits.  No acute ST elevations or T wave inversions concerning for acute ischemia.  No acute heart block arrhythmia concerning for acute electrolyte abnormalities.    Assessment and Plan:  Patient is a 63-year-old female with hypertension who is presenting for evaluation of 3 weeks of shortness of breath dysuria palpitations and low back pain.  She is vitally stable upon arrival.  She is not having any fevers or chills.  She had multiple ED visits over the past couple of weeks where she states that this was not fully addressed with the medications given.  She has been using muscle relaxers Tylenol and ibuprofen at home without improvement.  My differential for this patient includes refractory musculoskeletal pain, sciatica,  disease including hydronephrosis pyelonephrosis or nephrolithiasis, low suspicion for gynecologic origin or malignancy though cannot fully rule out without imaging.  Basic labs have been obtained for this patient with no leukocytosis significant anemia electrolyte abnormalities renal or liver dysfunction.  Initial urine sample for this was not as was made this was actually a sample from a different patient that had been improperly urine was resent for this patient pending at time of signout.  Patient has no red flag symptoms including chronic steroid use, IV drug use history, bowel or bladder incontinence, saddle anesthesia, recent spinal surgeries.  Given patient is here  for repeat visit will obtain CT abdomen pelvis.  Patient has contrast allergy and will obtain without contrast.  Patient was signed out to oncoming ED care team pending symptom control and work-up with imaging and repeat urine.    ED Course:  ED Course as of 11/02/23 1218   Tue Oct 31, 2023   0626 Made aware by triage that this was not correct urine sample [SC]      ED Course User Index  [SC] Xiao Ring DO         Diagnoses as of 11/02/23 1218   Chronic bilateral low back pain without sciatica      Pt seen and discussed with Dr. Cassius Ring DO  PGY-2 Emergency Medicine        Xiao Ring DO  Resident  11/04/23 [id number]

## 2023-10-31 NOTE — DISCHARGE INSTRUCTIONS
You were seen in the ED for back pain.  Your bloodwork, xrays and CT scans did not show any abnormalities.  You may take tylenol and/or motrin as needed for pain.  You may take flexeril as needed for back pain but should not take this if you are going to drive or operate heavy machinery as it may make you drowsy.   You should follow up with your regular doctor in 2-3d for a re-check.  You should return to the ED immediately if you develop any worsening back pain, new numbness/weakness/tingling your legs, or other concerning symptoms.

## 2023-11-02 LAB — BACTERIA UR CULT: ABNORMAL

## 2023-11-04 ENCOUNTER — TELEPHONE (OUTPATIENT)
Dept: PHARMACY | Facility: HOSPITAL | Age: 63
End: 2023-11-04
Payer: COMMERCIAL

## 2023-11-04 DIAGNOSIS — N39.0 COMPLICATED UTI (URINARY TRACT INFECTION): Primary | ICD-10-CM

## 2023-11-04 RX ORDER — SULFAMETHOXAZOLE AND TRIMETHOPRIM 800; 160 MG/1; MG/1
1 TABLET ORAL 2 TIMES DAILY
Qty: 14 TABLET | Refills: 0 | Status: SHIPPED | OUTPATIENT
Start: 2023-11-04 | End: 2023-11-11

## 2023-11-04 NOTE — PROGRESS NOTES
EDPD Note: Bug-Drug Mismatch    Contacted Ms. Ny Martinez regarding a positive urine E. Coli culturethat was taken during their recent emergency room visit. I completed education with patient. The patient is not being treated and she reports having urinary frequency that is different than normal and possible flank pain as the pain radiates from her mid back to the side.     The following prescription was sent to the patient's preferred pharmacy. No further follow up needed from EDPD Team.   Drug: Bactrim DS  Si tab PO BID  Days Supply: 7 days    Susceptibility data from last 90 days.  Collected Specimen Info Organism Ampicillin Cefazolin Cefazolin (uncomplicated UTIs only) Ciprofloxacin Gentamicin Nitrofurantoin Piperacillin/Tazobactam Trimethoprim/Sulfamethoxazole   10/31/23 Urine from [facility] Escherichia coli S S S S S S S S       Sarah Lynne, PharmD

## 2023-11-09 NOTE — TELEPHONE ENCOUNTER
Update:   Per Dr. Hammonds, the urine culture from 10/31/23 05:14 does not belong to this patient. Based on the patient's urinalysis from 10/31/23 06:30, the patient does not show that she has a UTI. There was no culture available for this urine sample. Per Dr. Hammonds, this patient does not have a UTI and therefore should not be treated with antibiotics.     Patient educated not to take her current prescription of Bactrim. She will follow up with her PCP to address her existing symptoms and get a repeat urine culture if needed.

## 2023-12-13 LAB
HOLD SPECIMEN: NORMAL
HOLD SPECIMEN: NORMAL

## 2024-01-22 PROCEDURE — 99283 EMERGENCY DEPT VISIT LOW MDM: CPT

## 2024-01-22 PROCEDURE — 99284 EMERGENCY DEPT VISIT MOD MDM: CPT

## 2024-01-23 ENCOUNTER — HOSPITAL ENCOUNTER (EMERGENCY)
Facility: HOSPITAL | Age: 64
Discharge: HOME | End: 2024-01-23
Attending: STUDENT IN AN ORGANIZED HEALTH CARE EDUCATION/TRAINING PROGRAM
Payer: COMMERCIAL

## 2024-01-23 VITALS
DIASTOLIC BLOOD PRESSURE: 74 MMHG | BODY MASS INDEX: 30.9 KG/M2 | SYSTOLIC BLOOD PRESSURE: 126 MMHG | TEMPERATURE: 98.1 F | HEART RATE: 74 BPM | HEIGHT: 64 IN | WEIGHT: 181 LBS | RESPIRATION RATE: 18 BRPM | OXYGEN SATURATION: 98 %

## 2024-01-23 DIAGNOSIS — B34.9 VIRAL SYNDROME: Primary | ICD-10-CM

## 2024-01-23 DIAGNOSIS — L30.9 ECZEMA, UNSPECIFIED TYPE: ICD-10-CM

## 2024-01-23 LAB
ALBUMIN SERPL BCP-MCNC: 3.6 G/DL (ref 3.4–5)
ALP SERPL-CCNC: 66 U/L (ref 33–136)
ALT SERPL W P-5'-P-CCNC: 8 U/L (ref 7–45)
ANION GAP SERPL CALC-SCNC: 9 MMOL/L (ref 10–20)
APPEARANCE UR: CLEAR
AST SERPL W P-5'-P-CCNC: 12 U/L (ref 9–39)
BASOPHILS # BLD AUTO: 0.03 X10*3/UL (ref 0–0.1)
BASOPHILS NFR BLD AUTO: 0.6 %
BILIRUB SERPL-MCNC: 0.8 MG/DL (ref 0–1.2)
BILIRUB UR STRIP.AUTO-MCNC: NEGATIVE MG/DL
BUN SERPL-MCNC: 24 MG/DL (ref 6–23)
CALCIUM SERPL-MCNC: 8.7 MG/DL (ref 8.6–10.6)
CHLORIDE SERPL-SCNC: 106 MMOL/L (ref 98–107)
CK SERPL-CCNC: 101 U/L (ref 0–215)
CO2 SERPL-SCNC: 28 MMOL/L (ref 21–32)
COLOR UR: YELLOW
CREAT SERPL-MCNC: 0.8 MG/DL (ref 0.5–1.05)
EGFRCR SERPLBLD CKD-EPI 2021: 84 ML/MIN/1.73M*2
EOSINOPHIL # BLD AUTO: 0.13 X10*3/UL (ref 0–0.7)
EOSINOPHIL NFR BLD AUTO: 2.7 %
ERYTHROCYTE [DISTWIDTH] IN BLOOD BY AUTOMATED COUNT: 11.3 % (ref 11.5–14.5)
FLUAV RNA RESP QL NAA+PROBE: NOT DETECTED
FLUBV RNA RESP QL NAA+PROBE: NOT DETECTED
GLUCOSE SERPL-MCNC: 82 MG/DL (ref 74–99)
GLUCOSE UR STRIP.AUTO-MCNC: NEGATIVE MG/DL
HCT VFR BLD AUTO: 32.1 % (ref 36–46)
HGB BLD-MCNC: 11.2 G/DL (ref 12–16)
IMM GRANULOCYTES # BLD AUTO: 0.01 X10*3/UL (ref 0–0.7)
IMM GRANULOCYTES NFR BLD AUTO: 0.2 % (ref 0–0.9)
KETONES UR STRIP.AUTO-MCNC: ABNORMAL MG/DL
LEUKOCYTE ESTERASE UR QL STRIP.AUTO: NEGATIVE
LYMPHOCYTES # BLD AUTO: 2.11 X10*3/UL (ref 1.2–4.8)
LYMPHOCYTES NFR BLD AUTO: 44.1 %
MAGNESIUM SERPL-MCNC: 2.34 MG/DL (ref 1.6–2.4)
MCH RBC QN AUTO: 30.9 PG (ref 26–34)
MCHC RBC AUTO-ENTMCNC: 34.9 G/DL (ref 32–36)
MCV RBC AUTO: 89 FL (ref 80–100)
MONOCYTES # BLD AUTO: 0.46 X10*3/UL (ref 0.1–1)
MONOCYTES NFR BLD AUTO: 9.6 %
NEUTROPHILS # BLD AUTO: 2.05 X10*3/UL (ref 1.2–7.7)
NEUTROPHILS NFR BLD AUTO: 42.8 %
NITRITE UR QL STRIP.AUTO: NEGATIVE
NRBC BLD-RTO: 0 /100 WBCS (ref 0–0)
PH UR STRIP.AUTO: 5 [PH]
PHOSPHATE SERPL-MCNC: 4.7 MG/DL (ref 2.5–4.9)
PLATELET # BLD AUTO: 231 X10*3/UL (ref 150–450)
POTASSIUM SERPL-SCNC: 4.2 MMOL/L (ref 3.5–5.3)
PROT SERPL-MCNC: 7 G/DL (ref 6.4–8.2)
PROT UR STRIP.AUTO-MCNC: NEGATIVE MG/DL
RBC # BLD AUTO: 3.62 X10*6/UL (ref 4–5.2)
RBC # UR STRIP.AUTO: NEGATIVE /UL
SARS-COV-2 RNA RESP QL NAA+PROBE: NOT DETECTED
SODIUM SERPL-SCNC: 139 MMOL/L (ref 136–145)
SP GR UR STRIP.AUTO: 1.02
UROBILINOGEN UR STRIP.AUTO-MCNC: 2 MG/DL
WBC # BLD AUTO: 4.8 X10*3/UL (ref 4.4–11.3)

## 2024-01-23 PROCEDURE — 80053 COMPREHEN METABOLIC PANEL: CPT | Performed by: STUDENT IN AN ORGANIZED HEALTH CARE EDUCATION/TRAINING PROGRAM

## 2024-01-23 PROCEDURE — 85025 COMPLETE CBC W/AUTO DIFF WBC: CPT | Performed by: STUDENT IN AN ORGANIZED HEALTH CARE EDUCATION/TRAINING PROGRAM

## 2024-01-23 PROCEDURE — 81003 URINALYSIS AUTO W/O SCOPE: CPT | Performed by: STUDENT IN AN ORGANIZED HEALTH CARE EDUCATION/TRAINING PROGRAM

## 2024-01-23 PROCEDURE — 87636 SARSCOV2 & INF A&B AMP PRB: CPT

## 2024-01-23 PROCEDURE — 2500000001 HC RX 250 WO HCPCS SELF ADMINISTERED DRUGS (ALT 637 FOR MEDICARE OP): Mod: SE

## 2024-01-23 PROCEDURE — 36415 COLL VENOUS BLD VENIPUNCTURE: CPT | Performed by: STUDENT IN AN ORGANIZED HEALTH CARE EDUCATION/TRAINING PROGRAM

## 2024-01-23 PROCEDURE — 83735 ASSAY OF MAGNESIUM: CPT | Performed by: STUDENT IN AN ORGANIZED HEALTH CARE EDUCATION/TRAINING PROGRAM

## 2024-01-23 PROCEDURE — 82550 ASSAY OF CK (CPK): CPT | Performed by: STUDENT IN AN ORGANIZED HEALTH CARE EDUCATION/TRAINING PROGRAM

## 2024-01-23 PROCEDURE — 84100 ASSAY OF PHOSPHORUS: CPT | Performed by: STUDENT IN AN ORGANIZED HEALTH CARE EDUCATION/TRAINING PROGRAM

## 2024-01-23 RX ORDER — DIPHENHYDRAMINE HCL 25 MG
25 CAPSULE ORAL ONCE
Status: COMPLETED | OUTPATIENT
Start: 2024-01-23 | End: 2024-01-23

## 2024-01-23 RX ORDER — IBUPROFEN 400 MG/1
800 TABLET ORAL ONCE
Status: COMPLETED | OUTPATIENT
Start: 2024-01-23 | End: 2024-01-23

## 2024-01-23 RX ORDER — HYDROCORTISONE 1 %
1 CREAM (GRAM) TOPICAL 2 TIMES DAILY
Qty: 6 G | Refills: 0 | Status: SHIPPED | OUTPATIENT
Start: 2024-01-23 | End: 2024-02-22

## 2024-01-23 RX ADMIN — DIPHENHYDRAMINE HYDROCHLORIDE 25 MG: 25 CAPSULE ORAL at 01:54

## 2024-01-23 RX ADMIN — IBUPROFEN 800 MG: 400 TABLET, FILM COATED ORAL at 01:53

## 2024-01-23 ASSESSMENT — LIFESTYLE VARIABLES
HAVE PEOPLE ANNOYED YOU BY CRITICIZING YOUR DRINKING: NO
HAVE YOU EVER FELT YOU SHOULD CUT DOWN ON YOUR DRINKING: NO
EVER FELT BAD OR GUILTY ABOUT YOUR DRINKING: NO
EVER HAD A DRINK FIRST THING IN THE MORNING TO STEADY YOUR NERVES TO GET RID OF A HANGOVER: NO
REASON UNABLE TO ASSESS: NO

## 2024-01-23 ASSESSMENT — PAIN - FUNCTIONAL ASSESSMENT
PAIN_FUNCTIONAL_ASSESSMENT: 0-10

## 2024-01-23 ASSESSMENT — PAIN SCALES - GENERAL
PAINLEVEL_OUTOF10: 2
PAINLEVEL_OUTOF10: 3
PAINLEVEL_OUTOF10: 2

## 2024-01-23 ASSESSMENT — COLUMBIA-SUICIDE SEVERITY RATING SCALE - C-SSRS
6. HAVE YOU EVER DONE ANYTHING, STARTED TO DO ANYTHING, OR PREPARED TO DO ANYTHING TO END YOUR LIFE?: NO
2. HAVE YOU ACTUALLY HAD ANY THOUGHTS OF KILLING YOURSELF?: NO
1. IN THE PAST MONTH, HAVE YOU WISHED YOU WERE DEAD OR WISHED YOU COULD GO TO SLEEP AND NOT WAKE UP?: NO

## 2024-01-23 ASSESSMENT — PAIN DESCRIPTION - PAIN TYPE
TYPE: ACUTE PAIN

## 2024-01-23 ASSESSMENT — PAIN DESCRIPTION - LOCATION: LOCATION: HEAD

## 2024-01-23 NOTE — PROGRESS NOTES
Patient was signed out to me by Charlene Bradford at approximately 0200. For full history, physical, and prior ED course, please see previous provider note prior to patient handoff. This is an addendum to the record.     MDM:  Ny Martinez is a 63 y.o. female presenting to the ED due to Flulike symptoms.  At time of signout, was pending remainder of this patient's workup.  Patient came in because she was feeling general body aches, feeling unwell.  Also notes a sore throat.  Symptoms been present for the past few days.  At time of signout was pending swabs and reassessment.  Patient is requesting additional workup including CBC and CMP which were unremarkable.  Patient states that she has a chronic UTI that she has been told not to take antibiotics for.  As such, will obtain UA.  UA was unremarkable.  Following this, patient was stable for discharge with return precautions.  The patient is amenable to this plan.    Final diagnoses:   [B34.9] Viral syndrome       Disposition:  Discharge    Adrianne Rosado MD   Emergency Medicine Resident, PGY3  [facility]    ED Course:  Diagnoses as of 01/23/24 0524   Viral syndrome       Procedure  Procedures

## 2024-01-23 NOTE — ED TRIAGE NOTES
KARLY CALVILLO is a 63y old F with a PMHx significant for HTN is presenting to [facility] ED with a chief concern for generalized body aches. Pt denies any fevers, chills, cough or recent sick contacts. No change to bowel or bladder patterns. No chest pain or SOB verbalized. Allergies documented in EMR

## 2024-01-23 NOTE — DISCHARGE INSTRUCTIONS
Your symptoms are consistent with a virus.  You could treat your pain with ibuprofen or Tylenol.  Please make sure that you are staying hydrated.  If anything else changes, or happy to see you in the emergency room.

## 2024-01-23 NOTE — ED PROVIDER NOTES
HPI   Chief Complaint   Patient presents with    Generalized Body Aches       Patient is a 63-year-old female presenting with a PMHx significant for HTN and left eye blindness presenting to the ED with generalized body aches.  Patient claims she started to experience whole-body aches, sore throat, right earache, and headache beginning yesterday.  She denies sick contacts.  She also tells me a friend recently told the her about Hand, Foot, Mouth disease, and Ms. Martinez endorses neck and chest hives/itchiness since.  Patient endorse multiple other unchanged, long-term complaints including chest pain and urinary frequency.  Patient denies fever/chills, headache, cough, congestion, chest pain, SOB, abdominal pain, N/V, or changes in urinary or bowel habits.                 No data recorded                Patient History   Past Medical History:   Diagnosis Date    Allergic rhinitis, unspecified 04/12/2018    Allergic sinusitis    Cellulitis of left upper limb 12/05/2018    Cellulitis of left upper extremity    Cellulitis, unspecified     Cellulitis    Disease of jaws, unspecified     Disorder of jaw    Disorder of pigmentation, unspecified 04/09/2018    Discoloration of skin of toe    Displaced fracture of fourth metatarsal bone, left foot, subsequent encounter for fracture with routine healing 05/10/2018    Closed displaced fracture of fourth metatarsal bone of left foot with routine healing, subsequent encounter    Displaced fracture of second metatarsal bone, left foot, subsequent encounter for fracture with routine healing 05/10/2018    Closed displaced fracture of second metatarsal bone of left foot with routine healing, subsequent encounter    Displaced fracture of third metatarsal bone, left foot, subsequent encounter for fracture with routine healing 05/10/2018    Closed displaced fracture of third metatarsal bone of left foot with routine healing, subsequent encounter    Effusion, left ankle 04/12/2018    Edema  of left ankle    Encounter for follow-up examination after completed treatment for conditions other than malignant neoplasm 04/15/2019    Hospital discharge follow-up    Encounter for other screening for malignant neoplasm of breast 04/12/2018    Breast cancer screening    Encounter for screening for diabetes mellitus 02/16/2015    Screening for diabetes mellitus    Encounter for screening mammogram for malignant neoplasm of breast     Visit for screening mammogram    Nonscarring hair loss, unspecified 02/16/2015    Hair loss    Osteoarthritis of knee, unspecified     Osteoarthritis of knee    Pain in left foot 05/10/2018    Acute foot pain, left    Pain in unspecified hip 07/08/2015    Hip pain    Pain in unspecified limb     Limb pain    Pain, unspecified 01/15/2019    Total body pain    Personal history of other diseases of the digestive system 03/17/2017    History of gastroesophageal reflux (GERD)    Personal history of other endocrine, nutritional and metabolic disease 05/09/2018    History of obesity    Personal history of other specified conditions 07/17/2020    History of pituitary neoplasm    Personal history of other specified conditions 06/09/2017    History of chest pain    Personal history of other specified conditions 08/28/2015    History of palpitations    Personal history of other specified conditions 05/10/2018    History of headache    Personal history of other specified conditions 08/27/2015    History of abdominal pain    Personal history of other specified conditions     History of fatigue    Personal history of other specified conditions     History of headache    Personal history of other specified conditions 08/28/2015    History of insomnia    Snoring     Snoring    Sprain of joints and ligaments of unspecified parts of neck, initial encounter 07/17/2013    Neck sprain    Unspecified fracture of left foot, initial encounter for closed fracture 09/12/2017    Foot fracture, left     Unspecified lump in the left breast, unspecified quadrant     Lump of left breast     Past Surgical History:   Procedure Laterality Date    APPENDECTOMY  07/17/2013    Appendectomy    CORONARY ARTERY BYPASS GRAFT  07/17/2013    Coronary Artery Surgery    MR HEAD ANGIO WO IV CONTRAST  8/6/2020    MR HEAD ANGIO WO IV CONTRAST 8/6/2020 [facility] [place] [facility]    MR NECK ANGIO WO IV CONTRAST  8/6/2020    MR NECK ANGIO WO IV CONTRAST 8/6/2020 [facility] [place] [facility]     No family history on file.  Social History     Tobacco Use    Smoking status: Never    Smokeless tobacco: Never   Substance Use Topics    Alcohol use: Yes    Drug use: Never       Physical Exam   ED Triage Vitals [01/23/24 0041]   Temp Heart Rate Respirations BP   36.7 °C (98.1 °F) 72 18 110/71      SpO2 Temp Source Heart Rate Source Patient Position   -- Temporal Monitor Sitting      BP Location FiO2 (%)     Right arm --       Physical Exam  Vitals reviewed.   Constitutional:       General: She is not in acute distress.     Appearance: She is not ill-appearing.   HENT:      Head: Normocephalic and atraumatic.      Right Ear: Tympanic membrane, ear canal and external ear normal.      Left Ear: Tympanic membrane, ear canal and external ear normal.      Nose: Nose normal. No congestion or rhinorrhea.      Mouth/Throat:      Mouth: Mucous membranes are moist.      Pharynx: Oropharynx is clear. No oropharyngeal exudate or posterior oropharyngeal erythema.   Eyes:      General:         Right eye: No discharge.         Left eye: No discharge.      Extraocular Movements: Extraocular movements intact.      Pupils: Pupils are equal, round, and reactive to light.      Comments: Unable to open left eye secondary to legal blindness    Cardiovascular:      Rate and Rhythm: Normal rate and regular rhythm.   Pulmonary:      Effort: Pulmonary effort is normal.      Breath sounds: Normal breath sounds.   Abdominal:      General: Bowel sounds are normal.      Palpations: Abdomen  is soft.   Musculoskeletal:      Cervical back: Normal range of motion and neck supple. No rigidity or tenderness.      Comments: Full passive ROM in all extremities   Skin:     General: Skin is warm and dry.      Comments: Dry skin on the neck and chest.  No evidence of eczema flare, dermatitis, or other rash.   Neurological:      General: No focal deficit present.      Mental Status: She is alert and oriented to person, place, and time.   Psychiatric:         Mood and Affect: Mood normal.         Behavior: Behavior normal.         ED Course & MDM   Diagnoses as of 01/23/24 1058   Viral syndrome   Eczema, unspecified type     Labs Reviewed   CBC WITH AUTO DIFFERENTIAL - Abnormal       Result Value    WBC 4.8      nRBC 0.0      RBC 3.62 (*)     Hemoglobin 11.2 (*)     Hematocrit 32.1 (*)     MCV 89      MCH 30.9      MCHC 34.9      RDW 11.3 (*)     Platelets 231      Neutrophils % 42.8      Immature Granulocytes %, Automated 0.2      Lymphocytes % 44.1      Monocytes % 9.6      Eosinophils % 2.7      Basophils % 0.6      Neutrophils Absolute 2.05      Immature Granulocytes Absolute, Automated 0.01      Lymphocytes Absolute 2.11      Monocytes Absolute 0.46      Eosinophils Absolute 0.13      Basophils Absolute 0.03     COMPREHENSIVE METABOLIC PANEL - Abnormal    Glucose 82      Sodium 139      Potassium 4.2      Chloride 106      Bicarbonate 28      Anion Gap 9 (*)     Urea Nitrogen 24 (*)     Creatinine 0.80      eGFR 84      Calcium 8.7      Albumin 3.6      Alkaline Phosphatase 66      Total Protein 7.0      AST 12      Bilirubin, Total 0.8      ALT 8     URINALYSIS WITH REFLEX MICROSCOPIC - Abnormal    Color, Urine Yellow      Appearance, Urine Clear      Specific Gravity, Urine 1.023      pH, Urine 5.0      Protein, Urine NEGATIVE      Glucose, Urine NEGATIVE      Blood, Urine NEGATIVE      Ketones, Urine 5 (TRACE) (*)     Bilirubin, Urine NEGATIVE      Urobilinogen, Urine 2.0 (*)     Nitrite, Urine NEGATIVE       Leukocyte Esterase, Urine NEGATIVE     SARS-COV-2 AND INFLUENZA A/B PCR - Normal    Flu A Result Not Detected      Flu B Result Not Detected      Coronavirus 2019, PCR Not Detected      Narrative:     This assay has received FDA Emergency Use Authorization (EUA) and  is only authorized for the duration of time that circumstances exist to justify the authorization of the emergency use of in vitro diagnostic tests for the detection of SARS-CoV-2 virus and/or diagnosis of COVID-19 infection under section 564(b)(1) of the Act, 21 U.S.C. 360bbb-3(b)(1). Testing for SARS-CoV-2 is only recommended for patients who meet current clinical and/or epidemiological criteria as defined by federal, state, or local public health directives. This assay is an in vitro diagnostic nucleic acid amplification test for the qualitative detection of SARS-CoV-2, Influenza A, and Influenza B from nasopharyngeal specimens and has been validated for use at [facility]. Negative results do not preclude COVID-19 infections or Influenza A/B infections, and should not be used as the sole basis for diagnosis, treatment, or other management decisions. If Influenza A/B and RSV PCR results are negative, testing for Parainfluenza virus, Adenovirus and Metapneumovirus is routinely performed for [facility] pediatric oncology and intensive care inpatients, and is available on other patients by placing an add-on request.    MAGNESIUM - Normal    Magnesium 2.34     PHOSPHORUS - Normal    Phosphorus 4.7     CREATINE KINASE - Normal    Creatine Kinase 101         Medical Decision Making  Patient is a 63-year-old female with a PMHx significant for HTN and left eye blindness presenting to the ED with generalized body aches.  History was obtained from the patient.  She endorses whole-body aches, HA, sore throat, and right earache beginning yesterday.  Also admits to itchiness and hives on her neck and chest.  On physical exam, she appears stable  and in no acute distress.  Skin is dry.  Nares patent bilaterally.  No oropharyngeal erythema or exudate.  Neck supple without erythema or swelling.  Normal external ears and bilateral tympanic membranes.  No respiratory distress or abdominal tenderness.  Remainder of exam as noted above.  Vital signs stable.     Patient's presentation and physical exam consistent with viral syndrome and dry skin.  Low suspicion for acute rash, allergic dermatitis, or deep neck space/throat infection such as Terry's, retropharyngeal abscess, or peritonsillar abscess.  Patient given Advil in the ED for her headache.  She also requested benadryl for her itchiness which was provided due to the fact that she is not driving home.  Viral swabs for flu and COVID ordered.    Pending swab results at time of sign out to overnight resident Dr. Adrianne Rosado @ 2 AM.        Procedure  Procedures    Attestation of Derek Del Angel MD    This patient was seen, evaluated, treated, and dispositioned by the advanced practice provider. I performed an independent history and physical examination, discussed and reviewed pertinent aspects of the case, care, and management with the advanced practice provider, was physically available to the advanced practice provider for questions and consultation at the time the patient was being evaluated in the Emergency Department, and agree with the general content of the advanced practice provider's note, findings, and plan of care.  I take responsibility for the patient management.      In summary, the patient is a 63-year-old woman with history of hypertension and left eye blindness who presents with a few days of diffuse bodyaches and sore throat.  Patient denies any nausea vomiting chest pain shortness of breath or diarrhea.  Patient denies any focal numbness tingling or weakness..  Denies any cough.  Patient reports dysuria for the last month.  No flank pain or bleeding in her urine.  Patient reports feeling  her back is itching.    PHYSICAL EXAM:  VITAL SIGNS: Nursing notes reviewed.  GENERAL:  Alert and interactive  EYES:   Eyes track. No injection.  ENT:  Airway patent. Mucus membranes moist.  RESPIRATORY:  Nonlabored breathing. Chest rise symmetric.  Clear bilaterally  NECK: No meningismus  CARDIOVASCULAR:  [Regular rate.] [Regular rhythm.]  GASTROINTESTINAL:  No distension.  Nontender  MUSCULOSKELETAL:  No deformity.  No swelling.  NEUROLOGICAL:  Awake. Moves extremities  SKIN:  Warm. Dry.  No rash on her back.    The patient's ED course included blood work, urine.  Low suspicion for meningitis encephalitis.  We sent viral swabs given that the body aches and pain she is describing seems most consistent with myalgias in the setting of a viral infection.  No oropharyngeal injection swelling or asymmetry to point towards retropharyngeal abscess peritonsillar abscess or Terry's angina.  Patient reports chronic dysuria so we sent urine.  Patient reports itchiness to her back which I suspect is likely due to dry skin.  No evidence of an allergic dermatitis or another rash.  Please refer to the advanced practice provider's note for further details of this case.     ----------------------------------------------------     Charlene Bradford PA-C  01/23/24 1121

## 2024-01-29 ENCOUNTER — HOSPITAL ENCOUNTER (EMERGENCY)
Facility: HOSPITAL | Age: 64
Discharge: HOME | End: 2024-01-29
Attending: EMERGENCY MEDICINE
Payer: COMMERCIAL

## 2024-01-29 VITALS
WEIGHT: 178 LBS | SYSTOLIC BLOOD PRESSURE: 165 MMHG | HEIGHT: 64 IN | DIASTOLIC BLOOD PRESSURE: 96 MMHG | TEMPERATURE: 98.3 F | BODY MASS INDEX: 30.39 KG/M2 | OXYGEN SATURATION: 100 % | HEART RATE: 62 BPM | RESPIRATION RATE: 16 BRPM

## 2024-01-29 DIAGNOSIS — R52 COMPLAINTS OF TOTAL BODY PAIN: Primary | ICD-10-CM

## 2024-01-29 LAB
ALBUMIN SERPL BCP-MCNC: 3.9 G/DL (ref 3.4–5)
ALP SERPL-CCNC: 79 U/L (ref 33–136)
ALT SERPL W P-5'-P-CCNC: 11 U/L (ref 7–45)
ANION GAP SERPL CALC-SCNC: 8 MMOL/L (ref 10–20)
AST SERPL W P-5'-P-CCNC: 15 U/L (ref 9–39)
BASOPHILS # BLD AUTO: 0.02 X10*3/UL (ref 0–0.1)
BASOPHILS NFR BLD AUTO: 0.5 %
BILIRUB SERPL-MCNC: 1 MG/DL (ref 0–1.2)
BUN SERPL-MCNC: 13 MG/DL (ref 6–23)
CALCIUM SERPL-MCNC: 9.5 MG/DL (ref 8.6–10.6)
CARDIAC TROPONIN I PNL SERPL HS: 3 NG/L (ref 0–34)
CHLORIDE SERPL-SCNC: 105 MMOL/L (ref 98–107)
CK SERPL-CCNC: 116 U/L (ref 0–215)
CO2 SERPL-SCNC: 30 MMOL/L (ref 21–32)
CREAT SERPL-MCNC: 0.62 MG/DL (ref 0.5–1.05)
EGFRCR SERPLBLD CKD-EPI 2021: >90 ML/MIN/1.73M*2
EOSINOPHIL # BLD AUTO: 0.09 X10*3/UL (ref 0–0.7)
EOSINOPHIL NFR BLD AUTO: 2 %
ERYTHROCYTE [DISTWIDTH] IN BLOOD BY AUTOMATED COUNT: 11.1 % (ref 11.5–14.5)
GLUCOSE SERPL-MCNC: 79 MG/DL (ref 74–99)
HCT VFR BLD AUTO: 33.7 % (ref 36–46)
HGB BLD-MCNC: 12.1 G/DL (ref 12–16)
IMM GRANULOCYTES # BLD AUTO: 0.01 X10*3/UL (ref 0–0.7)
IMM GRANULOCYTES NFR BLD AUTO: 0.2 % (ref 0–0.9)
LYMPHOCYTES # BLD AUTO: 1.5 X10*3/UL (ref 1.2–4.8)
LYMPHOCYTES NFR BLD AUTO: 34 %
MCH RBC QN AUTO: 31.9 PG (ref 26–34)
MCHC RBC AUTO-ENTMCNC: 35.9 G/DL (ref 32–36)
MCV RBC AUTO: 89 FL (ref 80–100)
MONOCYTES # BLD AUTO: 0.43 X10*3/UL (ref 0.1–1)
MONOCYTES NFR BLD AUTO: 9.8 %
NEUTROPHILS # BLD AUTO: 2.36 X10*3/UL (ref 1.2–7.7)
NEUTROPHILS NFR BLD AUTO: 53.5 %
NRBC BLD-RTO: 0 /100 WBCS (ref 0–0)
PLATELET # BLD AUTO: 223 X10*3/UL (ref 150–450)
POTASSIUM SERPL-SCNC: 3.8 MMOL/L (ref 3.5–5.3)
PROT SERPL-MCNC: 7.7 G/DL (ref 6.4–8.2)
RBC # BLD AUTO: 3.79 X10*6/UL (ref 4–5.2)
SODIUM SERPL-SCNC: 139 MMOL/L (ref 136–145)
TSH SERPL-ACNC: 0.78 MIU/L (ref 0.44–3.98)
WBC # BLD AUTO: 4.4 X10*3/UL (ref 4.4–11.3)

## 2024-01-29 PROCEDURE — 99284 EMERGENCY DEPT VISIT MOD MDM: CPT

## 2024-01-29 PROCEDURE — 96372 THER/PROPH/DIAG INJ SC/IM: CPT

## 2024-01-29 PROCEDURE — 80053 COMPREHEN METABOLIC PANEL: CPT

## 2024-01-29 PROCEDURE — 82550 ASSAY OF CK (CPK): CPT

## 2024-01-29 PROCEDURE — 84443 ASSAY THYROID STIM HORMONE: CPT

## 2024-01-29 PROCEDURE — 99284 EMERGENCY DEPT VISIT MOD MDM: CPT | Performed by: EMERGENCY MEDICINE

## 2024-01-29 PROCEDURE — 2500000005 HC RX 250 GENERAL PHARMACY W/O HCPCS: Mod: SE

## 2024-01-29 PROCEDURE — 85025 COMPLETE CBC W/AUTO DIFF WBC: CPT

## 2024-01-29 PROCEDURE — 99283 EMERGENCY DEPT VISIT LOW MDM: CPT | Mod: 27

## 2024-01-29 PROCEDURE — 2500000004 HC RX 250 GENERAL PHARMACY W/ HCPCS (ALT 636 FOR OP/ED): Mod: SE

## 2024-01-29 PROCEDURE — 36415 COLL VENOUS BLD VENIPUNCTURE: CPT

## 2024-01-29 PROCEDURE — 84484 ASSAY OF TROPONIN QUANT: CPT | Performed by: EMERGENCY MEDICINE

## 2024-01-29 RX ORDER — ACETAMINOPHEN 325 MG/1
650 TABLET ORAL EVERY 6 HOURS PRN
Qty: 15 TABLET | Refills: 0 | Status: SHIPPED | OUTPATIENT
Start: 2024-01-29 | End: 2024-02-03

## 2024-01-29 RX ORDER — LIDOCAINE 560 MG/1
1 PATCH PERCUTANEOUS; TOPICAL; TRANSDERMAL DAILY
Status: DISCONTINUED | OUTPATIENT
Start: 2024-01-29 | End: 2024-01-29 | Stop reason: HOSPADM

## 2024-01-29 RX ORDER — IBUPROFEN 600 MG/1
600 TABLET ORAL EVERY 6 HOURS PRN
Qty: 15 TABLET | Refills: 0 | Status: SHIPPED | OUTPATIENT
Start: 2024-01-29 | End: 2024-02-03

## 2024-01-29 RX ORDER — KETOROLAC TROMETHAMINE 30 MG/ML
30 INJECTION, SOLUTION INTRAMUSCULAR; INTRAVENOUS ONCE
Status: COMPLETED | OUTPATIENT
Start: 2024-01-29 | End: 2024-01-29

## 2024-01-29 RX ADMIN — KETOROLAC TROMETHAMINE 30 MG: 30 INJECTION, SOLUTION INTRAMUSCULAR; INTRAVENOUS at 15:21

## 2024-01-29 RX ADMIN — LIDOCAINE 1 PATCH: 4 PATCH TOPICAL at 15:20

## 2024-01-29 NOTE — ED TRIAGE NOTES
Pt coming in with complaints of headache, body aches, fatigue, fell from standing at home having left knee pain

## 2024-01-29 NOTE — DISCHARGE INSTRUCTIONS
I wrote you prescriptions for Tylenol and Advil.  You can take these every 6 hours as needed for pain.  In the meantime, please call the number listed on your discharge paperwork for the [facility] outpatient clinic.  Please make an appointment with a primary care provider who can see you as soon as possible.  Please present to them your concerns and they can further manage your symptoms and conditions going forward.

## 2024-01-29 NOTE — ED PROVIDER NOTES
HPI   Chief Complaint   Patient presents with    Flu Symptoms       Patient is a 63-year-old female with a past medical history significant for HTN and left eye blindness presenting to the ED with generalized body pain.  Patient endorses multiple symptoms ongoing for many months to years.  These symptoms consist of generalized body aches, headache, right sided back pain that worsens with breathing, left-sided thoracic pain, a burning sensation in the cervical region, and frequent urination.  She also endorses numbness/tingling in the fingertips, also ongoing for many months.  There has been no change to these symptoms in the recent weeks to months.  She does endorse a new concern and states that on Saturday she felt weak while getting up from a chair and fell onto her left knee.  Denies pain or swelling of the knee.  Denies hitting head or LOC.  She has tried taking Tylenol and Advil at home, but has gotten minimal relief in her symptoms.  She otherwise denies dizziness/lightheadedness, chest pain, shortness of breath, cough, congestion, abdominal pain, nausea/vomiting, urinary/bowel incontinence, or saddle paresthesias.                [place] Coma Scale Score: 15                  Patient History   Past Medical History:   Diagnosis Date    Allergic rhinitis, unspecified 04/12/2018    Allergic sinusitis    Cellulitis of left upper limb 12/05/2018    Cellulitis of left upper extremity    Cellulitis, unspecified     Cellulitis    Disease of jaws, unspecified     Disorder of jaw    Disorder of pigmentation, unspecified 04/09/2018    Discoloration of skin of toe    Displaced fracture of fourth metatarsal bone, left foot, subsequent encounter for fracture with routine healing 05/10/2018    Closed displaced fracture of fourth metatarsal bone of left foot with routine healing, subsequent encounter    Displaced fracture of second metatarsal bone, left foot, subsequent encounter for fracture with routine healing 05/10/2018     Closed displaced fracture of second metatarsal bone of left foot with routine healing, subsequent encounter    Displaced fracture of third metatarsal bone, left foot, subsequent encounter for fracture with routine healing 05/10/2018    Closed displaced fracture of third metatarsal bone of left foot with routine healing, subsequent encounter    Effusion, left ankle 04/12/2018    Edema of left ankle    Encounter for follow-up examination after completed treatment for conditions other than malignant neoplasm 04/15/2019    Hospital discharge follow-up    Encounter for other screening for malignant neoplasm of breast 04/12/2018    Breast cancer screening    Encounter for screening for diabetes mellitus 02/16/2015    Screening for diabetes mellitus    Encounter for screening mammogram for malignant neoplasm of breast     Visit for screening mammogram    Nonscarring hair loss, unspecified 02/16/2015    Hair loss    Osteoarthritis of knee, unspecified     Osteoarthritis of knee    Pain in left foot 05/10/2018    Acute foot pain, left    Pain in unspecified hip 07/08/2015    Hip pain    Pain in unspecified limb     Limb pain    Pain, unspecified 01/15/2019    Total body pain    Personal history of other diseases of the digestive system 03/17/2017    History of gastroesophageal reflux (GERD)    Personal history of other endocrine, nutritional and metabolic disease 05/09/2018    History of obesity    Personal history of other specified conditions 07/17/2020    History of pituitary neoplasm    Personal history of other specified conditions 06/09/2017    History of chest pain    Personal history of other specified conditions 08/28/2015    History of palpitations    Personal history of other specified conditions 05/10/2018    History of headache    Personal history of other specified conditions 08/27/2015    History of abdominal pain    Personal history of other specified conditions     History of fatigue    Personal history of  other specified conditions     History of headache    Personal history of other specified conditions 08/28/2015    History of insomnia    Snoring     Snoring    Sprain of joints and ligaments of unspecified parts of neck, initial encounter 07/17/2013    Neck sprain    Unspecified fracture of left foot, initial encounter for closed fracture 09/12/2017    Foot fracture, left    Unspecified lump in the left breast, unspecified quadrant     Lump of left breast     Past Surgical History:   Procedure Laterality Date    APPENDECTOMY  07/17/2013    Appendectomy    CORONARY ARTERY BYPASS GRAFT  07/17/2013    Coronary Artery Surgery    MR HEAD ANGIO WO IV CONTRAST  8/6/2020    MR HEAD ANGIO WO IV CONTRAST 8/6/2020 [facility] [place] [facility]    MR NECK ANGIO WO IV CONTRAST  8/6/2020    MR NECK ANGIO WO IV CONTRAST 8/6/2020 [facility] [place] [facility] [place] [facility]     No family history on file.  Social History     Tobacco Use    Smoking status: Never    Smokeless tobacco: Never   Substance Use Topics    Alcohol use: Yes    Drug use: Never       Physical Exam   ED Triage Vitals [01/29/24 1241]   Temperature Heart Rate Respirations BP   36.8 °C (98.3 °F) 62 16 (!) 165/96      Pulse Ox Temp Source Heart Rate Source Patient Position   100 % Temporal -- --      BP Location FiO2 (%)     -- --       Physical Exam  Vitals reviewed.   Constitutional:       General: She is not in acute distress.     Appearance: She is not ill-appearing.   HENT:      Head: Normocephalic and atraumatic.      Nose: Nose normal. No congestion or rhinorrhea.      Mouth/Throat:      Mouth: Mucous membranes are moist.      Pharynx: Oropharynx is clear.   Cardiovascular:      Rate and Rhythm: Normal rate and regular rhythm.   Pulmonary:      Effort: Pulmonary effort is normal. No respiratory distress.      Breath sounds: Normal breath sounds.   Abdominal:      General: Bowel sounds are normal. There is no distension.      Palpations: Abdomen is soft.      Tenderness: There is no  abdominal tenderness.   Musculoskeletal:      Cervical back: Normal range of motion and neck supple. No rigidity or tenderness.      Right knee: Normal. No swelling, deformity or effusion. Normal range of motion.      Left knee: Normal. No swelling, deformity or effusion. Normal range of motion.      Right lower leg: Edema present.      Left lower leg: Edema present.      Comments: Right-sided paraspinal muscle tenderness in the lumbar region.  Left sided paraspinal muscle tenderness in the thoracic region.  No signs of trauma or injury.  No step-offs, deformities, or midline tenderness.  Full ROM in all extremities.     Lymphadenopathy:      Cervical: No cervical adenopathy.   Skin:     General: Skin is warm and dry.   Neurological:      General: No focal deficit present.      Mental Status: She is alert and oriented to person, place, and time.      Sensory: Sensation is intact.      Motor: Motor function is intact.      Coordination: Coordination is intact. Romberg sign negative. Finger-Nose-Finger Test normal.      Gait: Gait is intact. Gait and tandem walk normal.   Psychiatric:         Mood and Affect: Mood normal.         Behavior: Behavior normal.         ED Course & MDM   Diagnoses as of 01/29/24 1748   Complaints of total body pain     Labs Reviewed   CBC WITH AUTO DIFFERENTIAL - Abnormal       Result Value    WBC 4.4      nRBC 0.0      RBC 3.79 (*)     Hemoglobin 12.1      Hematocrit 33.7 (*)     MCV 89      MCH 31.9      MCHC 35.9      RDW 11.1 (*)     Platelets 223      Neutrophils % 53.5      Immature Granulocytes %, Automated 0.2      Lymphocytes % 34.0      Monocytes % 9.8      Eosinophils % 2.0      Basophils % 0.5      Neutrophils Absolute 2.36      Immature Granulocytes Absolute, Automated 0.01      Lymphocytes Absolute 1.50      Monocytes Absolute 0.43      Eosinophils Absolute 0.09      Basophils Absolute 0.02     COMPREHENSIVE METABOLIC PANEL - Abnormal    Glucose 79      Sodium 139       Potassium 3.8      Chloride 105      Bicarbonate 30      Anion Gap 8 (*)     Urea Nitrogen 13      Creatinine 0.62      eGFR >90      Calcium 9.5      Albumin 3.9      Alkaline Phosphatase 79      Total Protein 7.7      AST 15      Bilirubin, Total 1.0      ALT 11     CREATINE KINASE - Normal    Creatine Kinase 116     TSH WITH REFLEX TO FREE T4 IF ABNORMAL - Normal    Thyroid Stimulating Hormone 0.78      Narrative:     TSH testing is performed using different testing methodology at [facility] than at other [facility]. Direct result comparisons should only be made within the same method.     TROPONIN I, HIGH SENSITIVITY - Normal    Troponin I, High Sensitivity 3      Narrative:     Less than 99th percentile of normal range cutoff-  Female and children under 18 years old <35 ng/L; Male <54 ng/L: Negative  Repeat testing should be performed if clinically indicated.     Female and children under 18 years old  ng/L; Male  ng/L:  Consistent with possible cardiac damage and possible increased clinical   risk. Serial measurements may help to assess extent of myocardial damage.     >120 ng/L: Consistent with cardiac damage, increased clinical risk and  myocardial infarction. Serial measurements may help assess extent of   myocardial damage.      NOTE: Children less than 1 year old may have higher baseline troponin   levels and results should be interpreted in conjunction with the overall   clinical context.    NOTE: Troponin I testing is performed using a different   testing methodology at [facility] than at other   [facility]. Direct result comparisons should only   be made within the same method.         Medical Decision Making  Patient is a 63-year-old female with a past medical history significant for HTN and left eye blindness presenting to the ED with generalized body pain.  History was obtained from the patient.  She endorses various symptoms such as whole body  aches, headache, right-sided back pain worse with breathing, left-sided thoracic pain, a burning sensation in the cervical region, and numbness/tingling in the fingertips ongoing for many months.  Her only recent complaint is that she slipped off of a chair and fell onto her left knee this past Saturday.  However, she does not complain of pain or swelling in the knee.  On physical exam, patient appears stable and in no acute distress.  Lungs are clear to auscultation bilaterally.  No abdominal tenderness to palpation.  Full range of motion of the neck with no tenderness or meningismus.  Generalized paraspinal muscle tenderness throughout.  No midline tenderness, step-offs, deformities, or signs of trauma or injury.  Moderate edema of the bilateral lower extremities.  No deformity, erythema, swelling, or decreased range of motion of the bilateral knees.  Neurologically intact including mentation and ambulation.  Remainder of exam as noted above.  Patient is hypertensive with a blood pressure of 165/96.  Other vital signs stable.    Patient was given Toradol for her headache and a lidocaine patch for her back pain in the ED.  Patient recently had lab work when she was here a week ago which were all within normal limits.  We repeated cardiac enzymes, CBC, CMP, TSH, and troponins to be sure of no acute process.  All of these results were within normal limits.  Very low suspicion for acute cardiac, pulmonary, gastrointestinal, musculoskeletal, or neurologic process at this time.  No further imaging or lab work is necessary at this time being that the patient's symptoms are all ongoing for many months and lab work continues to be within normal limts.  She remains hemodynamically stable and ready for discharge.  I wrote her prescriptions for Advil and Tylenol which she can use as needed for body pain.  In the meantime, I did include instructions for her to call and make an appointment with the [facility] outpatient clinic.   Patient is to make an appointment within the next week with a primary care provider who can manage her condition and symptoms going forward.  I educated her on signs and symptoms that would warrant returning to the ED.  She is agreeable and understanding to the plan and all of her questions were answered to satisfaction.        Procedure  Procedures     Charlene Bradford PA-C  01/29/24 1808

## 2024-04-11 ENCOUNTER — HOSPITAL ENCOUNTER (EMERGENCY)
Facility: HOSPITAL | Age: 64
Discharge: HOME | End: 2024-04-12
Attending: STUDENT IN AN ORGANIZED HEALTH CARE EDUCATION/TRAINING PROGRAM
Payer: COMMERCIAL
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
## 2024-04-11 PROCEDURE — 99285 EMERGENCY DEPT VISIT HI MDM: CPT

## 2024-04-11 RX ORDER — KETOROLAC TROMETHAMINE 15 MG/ML
15 INJECTION, SOLUTION INTRAMUSCULAR; INTRAVENOUS ONCE
Status: COMPLETED | OUTPATIENT
Start: 2024-04-12 | End: 2024-04-12

## 2024-04-11 RX ORDER — ORPHENADRINE CITRATE 30 MG/ML
60 INJECTION INTRAMUSCULAR; INTRAVENOUS ONCE
Status: COMPLETED | OUTPATIENT
Start: 2024-04-12 | End: 2024-04-12

## 2024-04-11 ASSESSMENT — PAIN SCALES - GENERAL: PAINLEVEL_OUTOF10: 8

## 2024-04-11 ASSESSMENT — LIFESTYLE VARIABLES
EVER FELT BAD OR GUILTY ABOUT YOUR DRINKING: NO
HAVE YOU EVER FELT YOU SHOULD CUT DOWN ON YOUR DRINKING: NO
TOTAL SCORE: 0
HAVE PEOPLE ANNOYED YOU BY CRITICIZING YOUR DRINKING: NO
EVER HAD A DRINK FIRST THING IN THE MORNING TO STEADY YOUR NERVES TO GET RID OF A HANGOVER: NO

## 2024-04-11 ASSESSMENT — PAIN - FUNCTIONAL ASSESSMENT: PAIN_FUNCTIONAL_ASSESSMENT: 0-10

## 2024-04-11 ASSESSMENT — PAIN DESCRIPTION - PAIN TYPE: TYPE: ACUTE PAIN

## 2024-04-11 ASSESSMENT — PAIN DESCRIPTION - LOCATION: LOCATION: GENERALIZED

## 2024-04-12 ENCOUNTER — APPOINTMENT (OUTPATIENT)
Dept: RADIOLOGY | Facility: HOSPITAL | Age: 64
End: 2024-04-12
Payer: COMMERCIAL

## 2024-04-12 VITALS
HEIGHT: 64 IN | TEMPERATURE: 98.6 F | DIASTOLIC BLOOD PRESSURE: 94 MMHG | OXYGEN SATURATION: 98 % | WEIGHT: 197.75 LBS | SYSTOLIC BLOOD PRESSURE: 160 MMHG | BODY MASS INDEX: 33.76 KG/M2 | RESPIRATION RATE: 14 BRPM | HEART RATE: 56 BPM

## 2024-04-12 PROCEDURE — 70450 CT HEAD/BRAIN W/O DYE: CPT | Performed by: SURGERY

## 2024-04-12 PROCEDURE — 72125 CT NECK SPINE W/O DYE: CPT

## 2024-04-12 PROCEDURE — 72131 CT LUMBAR SPINE W/O DYE: CPT

## 2024-04-12 PROCEDURE — 74176 CT ABD & PELVIS W/O CONTRAST: CPT

## 2024-04-12 PROCEDURE — 72125 CT NECK SPINE W/O DYE: CPT | Performed by: SURGERY

## 2024-04-12 PROCEDURE — 96372 THER/PROPH/DIAG INJ SC/IM: CPT | Performed by: STUDENT IN AN ORGANIZED HEALTH CARE EDUCATION/TRAINING PROGRAM

## 2024-04-12 PROCEDURE — 74176 CT ABD & PELVIS W/O CONTRAST: CPT | Performed by: SURGERY

## 2024-04-12 PROCEDURE — 72131 CT LUMBAR SPINE W/O DYE: CPT | Performed by: SURGERY

## 2024-04-12 PROCEDURE — 71250 CT THORAX DX C-: CPT | Performed by: SURGERY

## 2024-04-12 PROCEDURE — 70450 CT HEAD/BRAIN W/O DYE: CPT

## 2024-04-12 PROCEDURE — 2500000004 HC RX 250 GENERAL PHARMACY W/ HCPCS (ALT 636 FOR OP/ED): Performed by: STUDENT IN AN ORGANIZED HEALTH CARE EDUCATION/TRAINING PROGRAM

## 2024-04-12 RX ORDER — ORPHENADRINE CITRATE 100 MG/1
100 TABLET, EXTENDED RELEASE ORAL 2 TIMES DAILY PRN
Qty: 20 TABLET | Refills: 0 | Status: SHIPPED | OUTPATIENT
Start: 2024-04-12 | End: 2024-04-22

## 2024-04-12 RX ADMIN — KETOROLAC TROMETHAMINE 15 MG: 15 INJECTION, SOLUTION INTRAMUSCULAR; INTRAVENOUS at 01:36

## 2024-04-12 RX ADMIN — ORPHENADRINE CITRATE 60 MG: 60 INJECTION INTRAMUSCULAR; INTRAVENOUS at 01:36

## 2024-04-12 NOTE — DISCHARGE INSTRUCTIONS
If you develop uncontrollable nausea or vomiting, vision changes, if it becomes difficult to wake up, or if you become disoriented or have significant neck pain, please return to the emergency department.  Please understand that with a closed head injury, you may have concussion symptoms: Headaches and light sensitivity for several weeks.  please allow yourself to rest, and follow-up with your primary care provider as needed.    You will feel more sore tomorrow than you do today, and you may notice bruising develop in the next few days across your chest or belly. You may use ice packs to particularly sore areas, and you may alternate tylenol and ibuprofen every 4 hours as needed for pain.
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
## 2024-07-08 ENCOUNTER — APPOINTMENT (OUTPATIENT)
Dept: CARDIOLOGY | Facility: HOSPITAL | Age: 64
End: 2024-07-08
Payer: MEDICARE

## 2024-07-08 ENCOUNTER — APPOINTMENT (OUTPATIENT)
Dept: RADIOLOGY | Facility: HOSPITAL | Age: 64
End: 2024-07-08
Payer: MEDICARE

## 2024-07-08 ENCOUNTER — HOSPITAL ENCOUNTER (EMERGENCY)
Facility: HOSPITAL | Age: 64
Discharge: HOME | End: 2024-07-08
Attending: EMERGENCY MEDICINE
Payer: MEDICARE

## 2024-07-08 VITALS
TEMPERATURE: 97.5 F | HEIGHT: 64 IN | DIASTOLIC BLOOD PRESSURE: 81 MMHG | SYSTOLIC BLOOD PRESSURE: 146 MMHG | WEIGHT: 192 LBS | RESPIRATION RATE: 18 BRPM | HEART RATE: 72 BPM | OXYGEN SATURATION: 98 % | BODY MASS INDEX: 32.78 KG/M2

## 2024-07-08 DIAGNOSIS — I89.0 LYMPHEDEMA: Primary | ICD-10-CM

## 2024-07-08 DIAGNOSIS — I10 HYPERTENSION, UNSPECIFIED TYPE: ICD-10-CM

## 2024-07-08 DIAGNOSIS — M25.471 RIGHT ANKLE SWELLING: ICD-10-CM

## 2024-07-08 DIAGNOSIS — R60.0 LOCALIZED EDEMA: ICD-10-CM

## 2024-07-08 LAB
ALBUMIN SERPL BCP-MCNC: 3.8 G/DL (ref 3.4–5)
ALP SERPL-CCNC: 63 U/L (ref 33–136)
ALT SERPL W P-5'-P-CCNC: 7 U/L (ref 7–45)
ANION GAP SERPL CALC-SCNC: 7 MMOL/L (ref 10–20)
AST SERPL W P-5'-P-CCNC: 11 U/L (ref 9–39)
BASOPHILS # BLD AUTO: 0.01 X10*3/UL (ref 0–0.1)
BASOPHILS NFR BLD AUTO: 0.2 %
BILIRUB SERPL-MCNC: 1.3 MG/DL (ref 0–1.2)
BNP SERPL-MCNC: 42 PG/ML (ref 0–99)
BUN SERPL-MCNC: 11 MG/DL (ref 6–23)
CALCIUM SERPL-MCNC: 8.9 MG/DL (ref 8.6–10.3)
CARDIAC TROPONIN I PNL SERPL HS: 3 NG/L (ref 0–13)
CARDIAC TROPONIN I PNL SERPL HS: 3 NG/L (ref 0–13)
CHLORIDE SERPL-SCNC: 104 MMOL/L (ref 98–107)
CO2 SERPL-SCNC: 32 MMOL/L (ref 21–32)
CREAT SERPL-MCNC: 0.65 MG/DL (ref 0.5–1.05)
EGFRCR SERPLBLD CKD-EPI 2021: >90 ML/MIN/1.73M*2
EOSINOPHIL # BLD AUTO: 0.12 X10*3/UL (ref 0–0.7)
EOSINOPHIL NFR BLD AUTO: 2.8 %
ERYTHROCYTE [DISTWIDTH] IN BLOOD BY AUTOMATED COUNT: 11.4 % (ref 11.5–14.5)
GLUCOSE SERPL-MCNC: 91 MG/DL (ref 74–99)
HCT VFR BLD AUTO: 34 % (ref 36–46)
HGB BLD-MCNC: 11 G/DL (ref 12–16)
IMM GRANULOCYTES # BLD AUTO: 0 X10*3/UL (ref 0–0.7)
IMM GRANULOCYTES NFR BLD AUTO: 0 % (ref 0–0.9)
LYMPHOCYTES # BLD AUTO: 1.72 X10*3/UL (ref 1.2–4.8)
LYMPHOCYTES NFR BLD AUTO: 39.5 %
MAGNESIUM SERPL-MCNC: 2.16 MG/DL (ref 1.6–2.4)
MCH RBC QN AUTO: 30.6 PG (ref 26–34)
MCHC RBC AUTO-ENTMCNC: 32.4 G/DL (ref 32–36)
MCV RBC AUTO: 95 FL (ref 80–100)
MONOCYTES # BLD AUTO: 0.37 X10*3/UL (ref 0.1–1)
MONOCYTES NFR BLD AUTO: 8.5 %
NEUTROPHILS # BLD AUTO: 2.13 X10*3/UL (ref 1.2–7.7)
NEUTROPHILS NFR BLD AUTO: 49 %
NRBC BLD-RTO: 0 /100 WBCS (ref 0–0)
PLATELET # BLD AUTO: 210 X10*3/UL (ref 150–450)
POTASSIUM SERPL-SCNC: 3.7 MMOL/L (ref 3.5–5.3)
PROT SERPL-MCNC: 7.4 G/DL (ref 6.4–8.2)
RBC # BLD AUTO: 3.59 X10*6/UL (ref 4–5.2)
SODIUM SERPL-SCNC: 139 MMOL/L (ref 136–145)
WBC # BLD AUTO: 4.4 X10*3/UL (ref 4.4–11.3)

## 2024-07-08 PROCEDURE — 84484 ASSAY OF TROPONIN QUANT: CPT

## 2024-07-08 PROCEDURE — 93970 EXTREMITY STUDY: CPT | Performed by: SURGERY

## 2024-07-08 PROCEDURE — 99285 EMERGENCY DEPT VISIT HI MDM: CPT | Performed by: EMERGENCY MEDICINE

## 2024-07-08 PROCEDURE — 36415 COLL VENOUS BLD VENIPUNCTURE: CPT

## 2024-07-08 PROCEDURE — 71045 X-RAY EXAM CHEST 1 VIEW: CPT | Mod: FOREIGN READ | Performed by: RADIOLOGY

## 2024-07-08 PROCEDURE — 73610 X-RAY EXAM OF ANKLE: CPT | Mod: RT

## 2024-07-08 PROCEDURE — 93005 ELECTROCARDIOGRAM TRACING: CPT

## 2024-07-08 PROCEDURE — 99285 EMERGENCY DEPT VISIT HI MDM: CPT | Mod: 25

## 2024-07-08 PROCEDURE — 73610 X-RAY EXAM OF ANKLE: CPT | Mod: RIGHT SIDE | Performed by: RADIOLOGY

## 2024-07-08 PROCEDURE — 85025 COMPLETE CBC W/AUTO DIFF WBC: CPT

## 2024-07-08 PROCEDURE — 83735 ASSAY OF MAGNESIUM: CPT

## 2024-07-08 PROCEDURE — 80053 COMPREHEN METABOLIC PANEL: CPT

## 2024-07-08 PROCEDURE — 83880 ASSAY OF NATRIURETIC PEPTIDE: CPT

## 2024-07-08 PROCEDURE — 93970 EXTREMITY STUDY: CPT

## 2024-07-08 PROCEDURE — 71045 X-RAY EXAM CHEST 1 VIEW: CPT

## 2024-07-08 RX ORDER — LISINOPRIL AND HYDROCHLOROTHIAZIDE 20; 25 MG/1; MG/1
1 TABLET ORAL DAILY
Qty: 30 TABLET | Refills: 0 | Status: SHIPPED | OUTPATIENT
Start: 2024-07-08 | End: 2024-11-28 | Stop reason: SDUPTHER

## 2024-07-08 RX ORDER — ACETAMINOPHEN 325 MG/1
975 TABLET ORAL ONCE
Status: COMPLETED | OUTPATIENT
Start: 2024-07-08 | End: 2024-07-08

## 2024-07-08 RX ADMIN — ACETAMINOPHEN 975 MG: 325 TABLET ORAL at 20:02

## 2024-07-08 ASSESSMENT — PAIN - FUNCTIONAL ASSESSMENT
PAIN_FUNCTIONAL_ASSESSMENT: 0-10
PAIN_FUNCTIONAL_ASSESSMENT: 0-10

## 2024-07-08 ASSESSMENT — PAIN SCALES - GENERAL
PAINLEVEL_OUTOF10: 5 - MODERATE PAIN
PAINLEVEL_OUTOF10: 5 - MODERATE PAIN
PAINLEVEL_OUTOF10: 0 - NO PAIN
PAINLEVEL_OUTOF10: 2

## 2024-07-08 ASSESSMENT — PAIN DESCRIPTION - FREQUENCY: FREQUENCY: INTERMITTENT

## 2024-07-08 ASSESSMENT — PAIN DESCRIPTION - ORIENTATION: ORIENTATION: ANTERIOR

## 2024-07-08 ASSESSMENT — PAIN DESCRIPTION - PROGRESSION: CLINICAL_PROGRESSION: NOT CHANGED

## 2024-07-08 ASSESSMENT — PAIN DESCRIPTION - LOCATION: LOCATION: HEAD

## 2024-07-08 ASSESSMENT — COLUMBIA-SUICIDE SEVERITY RATING SCALE - C-SSRS
6. HAVE YOU EVER DONE ANYTHING, STARTED TO DO ANYTHING, OR PREPARED TO DO ANYTHING TO END YOUR LIFE?: NO
1. IN THE PAST MONTH, HAVE YOU WISHED YOU WERE DEAD OR WISHED YOU COULD GO TO SLEEP AND NOT WAKE UP?: NO
2. HAVE YOU ACTUALLY HAD ANY THOUGHTS OF KILLING YOURSELF?: NO

## 2024-07-08 ASSESSMENT — PAIN DESCRIPTION - ONSET: ONSET: SUDDEN

## 2024-07-08 ASSESSMENT — PAIN DESCRIPTION - PAIN TYPE: TYPE: ACUTE PAIN

## 2024-07-08 ASSESSMENT — PAIN DESCRIPTION - DESCRIPTORS: DESCRIPTORS: ACHING

## 2024-07-08 NOTE — ED PROVIDER NOTES
EMERGENCY DEPARTMENT ENCOUNTER      Pt Name: Ny Martinez  MRN: [id number]  Birthdate 1960  Date of evaluation: 7/8/2024  Provider: Raudel Barlow MD    CHIEF COMPLAINT       Chief Complaint   Patient presents with    Leg Swelling     Ble edema for 1 month     Subjective    HISTORY OF PRESENT ILLNESS    64-year-old female with history of HTN presenting to ED with complaint of leg swelling.  Patient reports being referred by urgent care for her leg swelling and to assess for DVT.  States she has had worsening leg swelling over the past month as well as intermittent fluttering in her chest approximately 5-6 times in the past week, pleuritic chest pain, and 3 episodes of non-bloody diarrhea over the past week. Denies fevers, coughing, burning urination, or redness in urine or stool.      History provided by:  Patient      Nursing Notes were reviewed.    PAST MEDICAL HISTORY     Past Medical History:   Diagnosis Date    Allergic rhinitis, unspecified 04/12/2018    Allergic sinusitis    Cellulitis of left upper limb 12/05/2018    Cellulitis of left upper extremity    Cellulitis, unspecified     Cellulitis    Disease of jaws, unspecified     Disorder of jaw    Disorder of pigmentation, unspecified 04/09/2018    Discoloration of skin of toe    Displaced fracture of fourth metatarsal bone, left foot, subsequent encounter for fracture with routine healing 05/10/2018    Closed displaced fracture of fourth metatarsal bone of left foot with routine healing, subsequent encounter    Displaced fracture of second metatarsal bone, left foot, subsequent encounter for fracture with routine healing 05/10/2018    Closed displaced fracture of second metatarsal bone of left foot with routine healing, subsequent encounter    Displaced fracture of third metatarsal bone, left foot, subsequent encounter for fracture with routine healing 05/10/2018    Closed displaced fracture of third metatarsal bone of left foot with routine healing,  subsequent encounter    Effusion, left ankle 04/12/2018    Edema of left ankle    Encounter for follow-up examination after completed treatment for conditions other than malignant neoplasm 04/15/2019    Hospital discharge follow-up    Encounter for other screening for malignant neoplasm of breast 04/12/2018    Breast cancer screening    Encounter for screening for diabetes mellitus 02/16/2015    Screening for diabetes mellitus    Encounter for screening mammogram for malignant neoplasm of breast     Visit for screening mammogram    Nonscarring hair loss, unspecified 02/16/2015    Hair loss    Osteoarthritis of knee, unspecified     Osteoarthritis of knee    Pain in left foot 05/10/2018    Acute foot pain, left    Pain in unspecified hip 07/08/2015    Hip pain    Pain in unspecified limb     Limb pain    Pain, unspecified 01/15/2019    Total body pain    Personal history of other diseases of the digestive system 03/17/2017    History of gastroesophageal reflux (GERD)    Personal history of other endocrine, nutritional and metabolic disease 05/09/2018    History of obesity    Personal history of other specified conditions 07/17/2020    History of pituitary neoplasm    Personal history of other specified conditions 06/09/2017    History of chest pain    Personal history of other specified conditions 08/28/2015    History of palpitations    Personal history of other specified conditions 05/10/2018    History of headache    Personal history of other specified conditions 08/27/2015    History of abdominal pain    Personal history of other specified conditions     History of fatigue    Personal history of other specified conditions     History of headache    Personal history of other specified conditions 08/28/2015    History of insomnia    Snoring     Snoring    Sprain of joints and ligaments of unspecified parts of neck, initial encounter 07/17/2013    Neck sprain    Unspecified fracture of left foot, initial encounter  for closed fracture 09/12/2017    Foot fracture, left    Unspecified lump in the left breast, unspecified quadrant     Lump of left breast         SURGICAL HISTORY       Past Surgical History:   Procedure Laterality Date    APPENDECTOMY  07/17/2013    Appendectomy    CORONARY ARTERY BYPASS GRAFT  07/17/2013    Coronary Artery Surgery    MR HEAD ANGIO WO IV CONTRAST  8/6/2020    MR HEAD ANGIO WO IV CONTRAST 8/6/2020 [facility]    MR NECK ANGIO WO IV CONTRAST  8/6/2020    MR NECK ANGIO WO IV CONTRAST 8/6/2020 [facility]         CURRENT MEDICATIONS       Discharge Medication List as of 7/8/2024  8:47 PM        CONTINUE these medications which have NOT CHANGED    Details   acetaminophen (TylenoL) 325 mg tablet Take 2 tablets (650 mg) by mouth every 6 hours if needed for mild pain (1 - 3) or fever (temp greater than 38.0 C)., Starting Sat 10/21/2023, Print      cyclobenzaprine (Flexeril) 10 mg tablet Take 1 tablet (10 mg) by mouth 3 times a day as needed for muscle spasms (Pain)., Starting Tue 10/31/2023, Print      ibuprofen 600 mg tablet Take 1 tablet (600 mg) by mouth every 8 hours if needed for mild pain (1 - 3) or fever (temp greater than 38.0 C)., Starting Sat 10/21/2023, Print      lidocaine (Lidoderm) 5 % patch Place 1 patch over 12 hours on the skin once daily. Remove & discard patch within 12 hours or as directed by MD., Starting Sat 10/21/2023, Print      methocarbamol (Robaxin) 500 mg tablet Take 1 tablet (500 mg) by mouth 3 times a day for 7 days. As needed for pain, Starting Sat 10/21/2023, Until Sat 10/28/2023, Print      orphenadrine (Norflex) 100 mg 12 hr tablet Take 1 tablet (100 mg) by mouth 2 times a day as needed for muscle spasms for up to 10 days. Do not crush, chew, or split., Starting Fri 4/12/2024, Until Mon 4/22/2024 at 2359, Normal             ALLERGIES     Banana, Cantaloupe, Iodinated contrast media, Kiwi, Latex, Pineapple, Shellfish derived, and  Hydrochlorothiazide    FAMILY HISTORY     No family history on file.    SOCIAL HISTORY       Social History     Socioeconomic History    Marital status: Single     Spouse name: Not on file    Number of children: Not on file    Years of education: Not on file    Highest education level: Not on file   Occupational History    Not on file   Tobacco Use    Smoking status: Never    Smokeless tobacco: Never   Substance and Sexual Activity    Alcohol use: Yes    Drug use: Never    Sexual activity: Not on file   Other Topics Concern    Not on file   Social History Narrative    Not on file     Social Determinants of Health     Financial Resource Strain: Not on file   Food Insecurity: Not on file   Transportation Needs: Not on file   Physical Activity: Not on file   Stress: Not on file   Social Connections: Not on file   Intimate Partner Violence: Not on file   Housing Stability: Not on file       SCREENINGS                       Objective    PHYSICAL EXAM    (up to 7 for level 4, 8 or more for level 5)     ED Triage Vitals   Temperature Heart Rate Respirations BP   07/08/24 1648 07/08/24 1648 07/08/24 1648 07/08/24 1700   36.4 °C (97.5 °F) 61 18 149/90      Pulse Ox Temp Source Heart Rate Source Patient Position   07/08/24 1648 07/08/24 1648 07/08/24 1648 07/08/24 1648   100 % Temporal Monitor Lying      BP Location FiO2 (%)     07/08/24 1648 --     Right arm        Physical Exam  Vitals and nursing note reviewed.   Constitutional:       General: She is awake. She is not in acute distress.     Appearance: Normal appearance. She is well-developed. She is obese.   HENT:      Head: Normocephalic and atraumatic.      Right Ear: External ear normal.      Left Ear: External ear normal.      Nose: Nose normal. No congestion or rhinorrhea.      Mouth/Throat:      Mouth: Mucous membranes are moist.      Pharynx: Oropharynx is clear. No posterior oropharyngeal erythema.   Eyes:      General: No scleral icterus.        Right eye: No  discharge.         Left eye: No discharge.      Extraocular Movements: Extraocular movements intact.      Conjunctiva/sclera: Conjunctivae normal.   Cardiovascular:      Rate and Rhythm: Normal rate and regular rhythm.      Pulses: Normal pulses.      Heart sounds: Normal heart sounds. No murmur (grade 3 or above) heard.     No friction rub.   Pulmonary:      Effort: Pulmonary effort is normal. No respiratory distress.      Breath sounds: Normal breath sounds. No stridor. No wheezing or rales.   Abdominal:      General: There is no distension.      Palpations: Abdomen is soft.      Tenderness: There is no abdominal tenderness. There is no right CVA tenderness, left CVA tenderness, guarding or rebound.   Musculoskeletal:         General: Tenderness (right lateral malleolus) present. No swelling.      Cervical back: Normal range of motion and neck supple.      Right lower leg: Edema present.      Left lower leg: Edema present.   Skin:     General: Skin is warm and dry.      Capillary Refill: Capillary refill takes less than 2 seconds.      Coloration: Skin is not jaundiced or pale.      Findings: No lesion or rash.   Neurological:      General: No focal deficit present.      Mental Status: She is alert and oriented to person, place, and time. Mental status is at baseline.      Cranial Nerves: No cranial nerve deficit.      Sensory: No sensory deficit.      Motor: No weakness.   Psychiatric:         Mood and Affect: Mood normal.         Behavior: Behavior normal. Behavior is cooperative.         Thought Content: Thought content normal.         Judgment: Judgment normal.          DIAGNOSTIC RESULTS     LABS:  Labs Reviewed   CBC WITH AUTO DIFFERENTIAL - Abnormal       Result Value    WBC 4.4      nRBC 0.0      RBC 3.59 (*)     Hemoglobin 11.0 (*)     Hematocrit 34.0 (*)     MCV 95      MCH 30.6      MCHC 32.4      RDW 11.4 (*)     Platelets 210      Neutrophils % 49.0      Immature Granulocytes %, Automated 0.0       Lymphocytes % 39.5      Monocytes % 8.5      Eosinophils % 2.8      Basophils % 0.2      Neutrophils Absolute 2.13      Immature Granulocytes Absolute, Automated 0.00      Lymphocytes Absolute 1.72      Monocytes Absolute 0.37      Eosinophils Absolute 0.12      Basophils Absolute 0.01     COMPREHENSIVE METABOLIC PANEL - Abnormal    Glucose 91      Sodium 139      Potassium 3.7      Chloride 104      Bicarbonate 32      Anion Gap 7 (*)     Urea Nitrogen 11      Creatinine 0.65      eGFR >90      Calcium 8.9      Albumin 3.8      Alkaline Phosphatase 63      Total Protein 7.4      AST 11      Bilirubin, Total 1.3 (*)     ALT 7     MAGNESIUM - Normal    Magnesium 2.16     B-TYPE NATRIURETIC PEPTIDE - Normal    BNP 42      Narrative:        <100 pg/mL - Heart failure unlikely  100-299 pg/mL - Intermediate probability of acute heart                  failure exacerbation. Correlate with clinical                  context and patient history.    >=300 pg/mL - Heart Failure likely. Correlate with clinical                  context and patient history.    BNP testing is performed using different testing methodology at [facility] than at other [facility]. Direct result comparisons should only be made within the same method.      SERIAL TROPONIN-INITIAL - Normal    Troponin I, High Sensitivity 3      Narrative:     Less than 99th percentile of normal range cutoff-  Female and children under 18 years old <14 ng/L; Male <21 ng/L: Negative  Repeat testing should be performed if clinically indicated.     Female and children under 18 years old 14-50 ng/L; Male 21-50 ng/L:  Consistent with possible cardiac damage and possible increased clinical   risk. Serial measurements may help to assess extent of myocardial damage.     >50 ng/L: Consistent with cardiac damage, increased clinical risk and  myocardial infarction. Serial measurements may help assess extent of   myocardial damage.      NOTE: Children less than 1  year old may have higher baseline troponin   levels and results should be interpreted in conjunction with the overall   clinical context.     NOTE: Troponin I testing is performed using a different   testing methodology at [facility] than at other   [facility]. Direct result comparisons should only   be made within the same method.   SERIAL TROPONIN, 1 HOUR - Normal    Troponin I, High Sensitivity 3      Narrative:     Less than 99th percentile of normal range cutoff-  Female and children under 18 years old <14 ng/L; Male <21 ng/L: Negative  Repeat testing should be performed if clinically indicated.     Female and children under 18 years old 14-50 ng/L; Male 21-50 ng/L:  Consistent with possible cardiac damage and possible increased clinical   risk. Serial measurements may help to assess extent of myocardial damage.     >50 ng/L: Consistent with cardiac damage, increased clinical risk and  myocardial infarction. Serial measurements may help assess extent of   myocardial damage.      NOTE: Children less than 1 year old may have higher baseline troponin   levels and results should be interpreted in conjunction with the overall   clinical context.     NOTE: Troponin I testing is performed using a different   testing methodology at [facility] than at other   [facility]. Direct result comparisons should only   be made within the same method.   TROPONIN SERIES- (INITIAL, 1 HR)    Narrative:     The following orders were created for panel order Troponin I Series, High Sensitivity (0, 1 HR).  Procedure                               Abnormality         Status                     ---------                               -----------         ------                     Troponin I, High Sensiti...[[id number]]  Normal              Final result               Troponin, High Sensitivi...[[id number]]  Normal              Final result                 Please view results for these tests on the  individual orders.       All other labs were within normal range or not returned as of this dictation.    Imaging  XR ankle right 3+ views   Final Result   Right ankle lateral >> medial malleoli soft tissue swelling, without   apparent fracture/dislocation.   RECOMMENDATION:   If symptoms persist despite conservative therapy, repeat study in one   week to assess for potential occult fracture, which demonstrates:   1. Lauren-fracture new periosteal reaction; or    2. Fracture margin increased osteoclastic activity; increased   conspicuity of fracture margins.       Signed by Dick Yanez MD      Vascular US lower extremity venous duplex bilateral         XR chest 1 view   Final Result   Chronic right diaphragmatic elevation.  No acute disease or change..   Signed by Gallo Kauffman MD               Procedures  Procedures     MDM/EMERGENCY DEPARTMENT COURSE:   Medical Decision Making  64-year-old female presenting to the ED with complaint of leg swelling, pleuritic chest pain, and intermittent palpitations.  Patient is afebrile, sinus, hypertensive, saturating well on room air, and in no acute distress.  There is tenderness to her lateral malleolus on her right ankle and bilateral nonpitting edema of the lower extremities.  Cardiac workup, BNP, duplex of the lower extremities, and x-ray of the right foot ordered.    Labs and imaging interpretations as well as further decision making per ED course below.      ED Course as of 07/09/24 0020   Mon Jul 08, 2024 2005 XR ankle right 3+ views  Right ankle lateral >> medial malleoli soft tissue swelling, without apparent fracture/dislocation. [ES]   2010 On my interpretation of labs, results are unimpressive for systemic inflammation or infection, acute anemia or blood loss, BRENDA, ACS, AHF, hepatitis, or electrolyte abnormalities. [ES]   2034 Source patient's home medications with pharmacy who reports patient takes lisinopril with HCTZ 25 mg.  Patient provided paper  prescription per her request. [ES]      ED Course User Index  [ES] Raudel Barlow MD         Diagnoses as of 07/09/24 0020   Lymphedema   Right ankle swelling   Hypertension, unspecified type        Patient and or family in agreement and understanding of treatment plan.  All questions answered.      I reviewed the case with the attending ED physician. The attending ED physician agrees with the plan. Patient and/or patient´s representative was counseled regarding labs, imaging, likely diagnosis, and plan. All questions were answered.    ED Medications administered this visit:    Medications   acetaminophen (Tylenol) tablet 975 mg (975 mg oral Given 7/8/24 2002)       New Prescriptions from this visit:    Discharge Medication List as of 7/8/2024  8:47 PM        START taking these medications    Details   lisinopriL-hydrochlorothiazide 20-25 mg tablet Take 1 tablet by mouth once daily., Starting Mon 7/8/2024, Until Wed 8/7/2024, Print             Follow-up:  Your PCP    In 3 days      Gabriel Mack MD  [id number] [facility] [address] [phone]    In 1 week      Janice Umanzor DPM  [id number] [facility]  [address]  [facility] [id number] [phone]    Schedule an appointment as soon as possible for a visit   foot pains        Final Impression:   1. Lymphedema    2. Localized edema    3. Right ankle swelling    4. Hypertension, unspecified type          (Please note that portions of this note were completed with a voice recognition program.  Efforts were made to edit the dictations but occasionally words are mis-transcribed.)     Raudel Barlow MD  Resident  07/09/24 0020

## 2024-07-09 LAB
ATRIAL RATE: 55 BPM
ATRIAL RATE: 58 BPM
P AXIS: 41 DEGREES
P AXIS: 41 DEGREES
P OFFSET: 180 MS
P OFFSET: 187 MS
P ONSET: 124 MS
P ONSET: 131 MS
PR INTERVAL: 188 MS
PR INTERVAL: 200 MS
Q ONSET: 224 MS
Q ONSET: 225 MS
QRS COUNT: 9 BEATS
QRS COUNT: 9 BEATS
QRS DURATION: 80 MS
QRS DURATION: 84 MS
QT INTERVAL: 422 MS
QT INTERVAL: 428 MS
QTC CALCULATION(BAZETT): 403 MS
QTC CALCULATION(BAZETT): 420 MS
QTC FREDERICIA: 410 MS
QTC FREDERICIA: 423 MS
R AXIS: -15 DEGREES
R AXIS: -17 DEGREES
T AXIS: 31 DEGREES
T AXIS: 44 DEGREES
T OFFSET: 436 MS
T OFFSET: 438 MS
VENTRICULAR RATE: 55 BPM
VENTRICULAR RATE: 58 BPM

## 2024-07-09 NOTE — DISCHARGE INSTRUCTIONS
If right ankle symptoms persist despite conservative therapy, repeat X-ray in one week to assess for potential occult fracture of the right ankle.    Seek immediate medical attention should you have:  fevers, shortness of breath, chest pain, weakness, confusion, vomiting, or any worsening or concerning symptoms.

## 2024-10-14 ENCOUNTER — HOSPITAL ENCOUNTER (EMERGENCY)
Facility: HOSPITAL | Age: 64
Discharge: HOME | End: 2024-10-14
Payer: COMMERCIAL

## 2024-10-14 VITALS
BODY MASS INDEX: 29.88 KG/M2 | DIASTOLIC BLOOD PRESSURE: 79 MMHG | OXYGEN SATURATION: 96 % | WEIGHT: 175 LBS | HEIGHT: 64 IN | RESPIRATION RATE: 16 BRPM | TEMPERATURE: 98.4 F | HEART RATE: 95 BPM | SYSTOLIC BLOOD PRESSURE: 150 MMHG

## 2024-10-14 DIAGNOSIS — J06.9 VIRAL URI: Primary | ICD-10-CM

## 2024-10-14 LAB
FLUAV RNA RESP QL NAA+PROBE: NOT DETECTED
FLUBV RNA RESP QL NAA+PROBE: NOT DETECTED
S PYO DNA THROAT QL NAA+PROBE: NOT DETECTED
SARS-COV-2 RNA RESP QL NAA+PROBE: NOT DETECTED

## 2024-10-14 PROCEDURE — 99284 EMERGENCY DEPT VISIT MOD MDM: CPT | Performed by: PHYSICIAN ASSISTANT

## 2024-10-14 PROCEDURE — 87636 SARSCOV2 & INF A&B AMP PRB: CPT | Performed by: PHYSICIAN ASSISTANT

## 2024-10-14 PROCEDURE — 87651 STREP A DNA AMP PROBE: CPT | Performed by: PHYSICIAN ASSISTANT

## 2024-10-14 PROCEDURE — 99283 EMERGENCY DEPT VISIT LOW MDM: CPT

## 2024-10-14 RX ORDER — BENZOCAINE AND MENTHOL, UNSPECIFIED FORM 15; 2.3 MG/1; MG/1
1 LOZENGE ORAL 4 TIMES DAILY PRN
Qty: 12 LOZENGE | Refills: 0 | Status: SHIPPED | OUTPATIENT
Start: 2024-10-14 | End: 2024-10-17

## 2024-10-14 ASSESSMENT — PAIN SCALES - GENERAL: PAINLEVEL_OUTOF10: 4

## 2024-10-14 ASSESSMENT — PAIN - FUNCTIONAL ASSESSMENT: PAIN_FUNCTIONAL_ASSESSMENT: 0-10

## 2024-10-14 NOTE — ED PROVIDER NOTES
Emergency Department Provider Note        History of Present Illness     History provided by: Patient  Limitations to History: None  External Records Reviewed with Brief Summary: None    HPI:  Ny Martinez is a 64 y.o. female Patient is a 64-year-old female with chronic left eye blindness, nonalcoholic fatty liver disease, sleep apnea, eczema, hypertension who presents today for evaluation of sore throat and nasal congestion, symptoms started yesterday, today is now the second day, patient states she feels like she has pressure in her sinuses.  She denies any cough or chest congestion, denies any ear pain, denies any fevers or chills, denies known sick contacts.  She does endorse a generalized sore throat, not unilateral, no difficulty breathing swallowing or managing secretions.  She has not taken anything for her symptoms.  She is requesting something to drink.      Physical Exam   Triage vitals:  T 36.9 °C (98.4 °F)  HR 95  /79  RR 16  O2 96 %      Physical Exam  Vitals and nursing note reviewed.   Constitutional:       General: She is not in acute distress.     Appearance: Normal appearance. She is not toxic-appearing.   HENT:      Head: Normocephalic and atraumatic.      Nose: Congestion and rhinorrhea present. Rhinorrhea is clear.      Mouth/Throat:      Mouth: Mucous membranes are moist. No oral lesions.      Pharynx: Oropharynx is clear. Uvula midline. No pharyngeal swelling, oropharyngeal exudate, posterior oropharyngeal erythema or uvula swelling.   Eyes:      Extraocular Movements: Extraocular movements intact.      Comments: L eye corneal opacification (chronic blindness of L eye)   Cardiovascular:      Rate and Rhythm: Normal rate and regular rhythm.   Pulmonary:      Effort: Pulmonary effort is normal.   Abdominal:      Palpations: Abdomen is soft.   Musculoskeletal:         General: Normal range of motion.      Cervical back: Normal range of motion and neck supple.   Skin:     General:  Skin is warm and dry.   Neurological:      General: No focal deficit present.      Mental Status: She is alert.   Psychiatric:         Mood and Affect: Mood normal.         Thought Content: Thought content normal.        No orders to display     Labs Reviewed   GROUP A STREPTOCOCCUS, PCR - Normal       Result Value    Group A Strep PCR Not Detected     INFLUENZA A AND B PCR - Normal    Flu A Result Not Detected      Flu B Result Not Detected      Narrative:     This assay is an in vitro diagnostic multiplex nucleic acid amplification test for the detection and discrimination of Influenza A & B from nasopharyngeal specimens, and has been validated for use at [facility]. Negative results do not preclude Influenza A/B infections, and should not be used as the sole basis for diagnosis, treatment, or other management decisions. If Influenza A/B and RSV PCR results are negative, testing for Parainfluenza virus, Adenovirus and Metapneumovirus is routinely performed for [facility] pediatric oncology and intensive care inpatients, and is available on other patients by placing an add-on request.   SARS-COV-2 PCR - Normal    Coronavirus 2019, PCR Not Detected      Narrative:     This assay has received FDA Emergency Use Authorization (EUA) and is only authorized for the duration of time that circumstances exist to justify the authorization of the emergency use of in vitro diagnostic tests for the detection of SARS-CoV-2 virus and/or diagnosis of COVID-19 infection under section 564(b)(1) of the Act, 21 U.S.C. 360bbb-3(b)(1). This assay is an in vitro diagnostic nucleic acid amplification test for the qualitative detection of SARS-CoV-2 from nasopharyngeal specimens and has been validated for use at [facility]. Negative results do not preclude COVID-19 infections and should not be used as the sole basis for diagnosis, treatment, or other management decisions.       Diagnoses as of  10/14/24 1456   Viral URI         Medical Decision Making & ED Course   Medical Decision Makin y.o. female   MDM: Patient is a 64-year-old female who presents today for nasal congestion and sore throat, her oropharynx is nonerythematous without exudate, uvula midline, no lymphadenopathy, she does have nasal congestion with clear rhinorrhea, I did test her for flu COVID and strep.Flu strep and COVID swabs are all negative, patient will be discharged with Cepacol throat lozenges for her sore throat.  Symptoms consistent with viral URI, patient encouraged to return with new or worsening symptoms, fevers, symptoms lasting more than 10 days, etc.  She expressed understanding.  ----      Differential diagnoses considered include but are not limited to: strep, covid, flu, uri, abscess, etc.     Social Determinants of Health which Significantly Impact Care: None identified     EKG Independent Interpretation: EKG interpreted by myself. Please see ED Course for full interpretation.    Independent Result Review and Interpretation: Relevant laboratory and radiographic results were reviewed and independently interpreted by myself.  As necessary, they are commented on in the ED Course.    Chronic conditions affecting the patient's care: As documented above in [facility]    The patient was discussed with the following consultants/services: None    Care Considerations: As documented above in [facility]    ED Course:  Diagnoses as of 10/14/24 1456   Viral URI     Disposition   As a result of the work-up, the patient was discharged home.  she was informed of her diagnosis and instructed to come back with any concerns or worsening of condition.  she and was agreeable to the plan as discussed above.  she was given the opportunity to ask questions.  All of the patient's questions were answered.    Procedures   Procedures    Patient was seen independently    Nancy Green PA-C  Emergency Medicine       Nancy Green PA-C  10/14/24 1456

## 2024-11-28 ENCOUNTER — APPOINTMENT (OUTPATIENT)
Dept: RADIOLOGY | Facility: HOSPITAL | Age: 64
End: 2024-11-28
Payer: MEDICARE

## 2024-11-28 ENCOUNTER — HOSPITAL ENCOUNTER (EMERGENCY)
Facility: HOSPITAL | Age: 64
Discharge: HOME | End: 2024-11-28
Payer: MEDICARE

## 2024-11-28 VITALS
RESPIRATION RATE: 18 BRPM | WEIGHT: 185 LBS | SYSTOLIC BLOOD PRESSURE: 154 MMHG | BODY MASS INDEX: 31.58 KG/M2 | HEART RATE: 71 BPM | OXYGEN SATURATION: 98 % | HEIGHT: 64 IN | TEMPERATURE: 98.3 F | DIASTOLIC BLOOD PRESSURE: 90 MMHG

## 2024-11-28 DIAGNOSIS — I10 HYPERTENSION, UNSPECIFIED TYPE: ICD-10-CM

## 2024-11-28 DIAGNOSIS — J06.9 UPPER RESPIRATORY TRACT INFECTION, UNSPECIFIED TYPE: Primary | ICD-10-CM

## 2024-11-28 LAB
ALBUMIN SERPL BCP-MCNC: 3.8 G/DL (ref 3.4–5)
ALP SERPL-CCNC: 71 U/L (ref 33–136)
ALT SERPL W P-5'-P-CCNC: 5 U/L (ref 7–45)
ANION GAP SERPL CALC-SCNC: 11 MMOL/L (ref 10–20)
APPEARANCE UR: CLEAR
AST SERPL W P-5'-P-CCNC: 13 U/L (ref 9–39)
BACTERIA #/AREA URNS AUTO: ABNORMAL /HPF
BASOPHILS # BLD AUTO: 0.02 X10*3/UL (ref 0–0.1)
BASOPHILS NFR BLD AUTO: 0.3 %
BILIRUB SERPL-MCNC: 1.4 MG/DL (ref 0–1.2)
BILIRUB UR STRIP.AUTO-MCNC: NEGATIVE MG/DL
BNP SERPL-MCNC: 35 PG/ML (ref 0–99)
BUN SERPL-MCNC: 15 MG/DL (ref 6–23)
CALCIUM SERPL-MCNC: 8.9 MG/DL (ref 8.6–10.6)
CARDIAC TROPONIN I PNL SERPL HS: <3 NG/L (ref 0–34)
CHLORIDE SERPL-SCNC: 106 MMOL/L (ref 98–107)
CO2 SERPL-SCNC: 26 MMOL/L (ref 21–32)
COLOR UR: ABNORMAL
CREAT SERPL-MCNC: 0.55 MG/DL (ref 0.5–1.05)
EGFRCR SERPLBLD CKD-EPI 2021: >90 ML/MIN/1.73M*2
EOSINOPHIL # BLD AUTO: 0.09 X10*3/UL (ref 0–0.7)
EOSINOPHIL NFR BLD AUTO: 1.5 %
ERYTHROCYTE [DISTWIDTH] IN BLOOD BY AUTOMATED COUNT: 11.4 % (ref 11.5–14.5)
FLUAV RNA RESP QL NAA+PROBE: NOT DETECTED
FLUBV RNA RESP QL NAA+PROBE: NOT DETECTED
GLUCOSE SERPL-MCNC: 82 MG/DL (ref 74–99)
GLUCOSE UR STRIP.AUTO-MCNC: NORMAL MG/DL
HCT VFR BLD AUTO: 32.1 % (ref 36–46)
HGB BLD-MCNC: 11.3 G/DL (ref 12–16)
IMM GRANULOCYTES # BLD AUTO: 0.02 X10*3/UL (ref 0–0.7)
IMM GRANULOCYTES NFR BLD AUTO: 0.3 % (ref 0–0.9)
KETONES UR STRIP.AUTO-MCNC: NEGATIVE MG/DL
LEUKOCYTE ESTERASE UR QL STRIP.AUTO: NEGATIVE
LYMPHOCYTES # BLD AUTO: 1.46 X10*3/UL (ref 1.2–4.8)
LYMPHOCYTES NFR BLD AUTO: 24.7 %
MCH RBC QN AUTO: 31 PG (ref 26–34)
MCHC RBC AUTO-ENTMCNC: 35.2 G/DL (ref 32–36)
MCV RBC AUTO: 88 FL (ref 80–100)
MONOCYTES # BLD AUTO: 0.42 X10*3/UL (ref 0.1–1)
MONOCYTES NFR BLD AUTO: 7.1 %
MUCOUS THREADS #/AREA URNS AUTO: ABNORMAL /LPF
NEUTROPHILS # BLD AUTO: 3.9 X10*3/UL (ref 1.2–7.7)
NEUTROPHILS NFR BLD AUTO: 66.1 %
NITRITE UR QL STRIP.AUTO: NEGATIVE
NRBC BLD-RTO: 0 /100 WBCS (ref 0–0)
PH UR STRIP.AUTO: 6 [PH]
PLATELET # BLD AUTO: 256 X10*3/UL (ref 150–450)
POTASSIUM SERPL-SCNC: 3.8 MMOL/L (ref 3.5–5.3)
PROT SERPL-MCNC: 7.4 G/DL (ref 6.4–8.2)
PROT UR STRIP.AUTO-MCNC: NEGATIVE MG/DL
RBC # BLD AUTO: 3.64 X10*6/UL (ref 4–5.2)
RBC # UR STRIP.AUTO: ABNORMAL /UL
RBC #/AREA URNS AUTO: ABNORMAL /HPF
RSV RNA RESP QL NAA+PROBE: NOT DETECTED
SARS-COV-2 RNA RESP QL NAA+PROBE: NOT DETECTED
SODIUM SERPL-SCNC: 139 MMOL/L (ref 136–145)
SP GR UR STRIP.AUTO: 1.02
UROBILINOGEN UR STRIP.AUTO-MCNC: NORMAL MG/DL
WBC # BLD AUTO: 5.9 X10*3/UL (ref 4.4–11.3)
WBC #/AREA URNS AUTO: ABNORMAL /HPF

## 2024-11-28 PROCEDURE — 2500000001 HC RX 250 WO HCPCS SELF ADMINISTERED DRUGS (ALT 637 FOR MEDICARE OP): Performed by: PHYSICIAN ASSISTANT

## 2024-11-28 PROCEDURE — 81001 URINALYSIS AUTO W/SCOPE: CPT | Performed by: PHYSICIAN ASSISTANT

## 2024-11-28 PROCEDURE — 71046 X-RAY EXAM CHEST 2 VIEWS: CPT

## 2024-11-28 PROCEDURE — 36415 COLL VENOUS BLD VENIPUNCTURE: CPT | Performed by: PHYSICIAN ASSISTANT

## 2024-11-28 PROCEDURE — 84484 ASSAY OF TROPONIN QUANT: CPT | Performed by: PHYSICIAN ASSISTANT

## 2024-11-28 PROCEDURE — 80053 COMPREHEN METABOLIC PANEL: CPT | Performed by: PHYSICIAN ASSISTANT

## 2024-11-28 PROCEDURE — 99283 EMERGENCY DEPT VISIT LOW MDM: CPT | Mod: 25

## 2024-11-28 PROCEDURE — 83880 ASSAY OF NATRIURETIC PEPTIDE: CPT | Performed by: PHYSICIAN ASSISTANT

## 2024-11-28 PROCEDURE — 87637 SARSCOV2&INF A&B&RSV AMP PRB: CPT | Performed by: PHYSICIAN ASSISTANT

## 2024-11-28 PROCEDURE — 85025 COMPLETE CBC W/AUTO DIFF WBC: CPT | Performed by: PHYSICIAN ASSISTANT

## 2024-11-28 RX ORDER — LISINOPRIL 20 MG/1
20 TABLET ORAL ONCE
Status: COMPLETED | OUTPATIENT
Start: 2024-11-28 | End: 2024-11-28

## 2024-11-28 RX ORDER — IBUPROFEN 600 MG/1
600 TABLET ORAL ONCE
Status: COMPLETED | OUTPATIENT
Start: 2024-11-28 | End: 2024-11-28

## 2024-11-28 RX ORDER — BENZONATATE 100 MG/1
200 CAPSULE ORAL 3 TIMES DAILY PRN
Qty: 42 CAPSULE | Refills: 0 | Status: SHIPPED | OUTPATIENT
Start: 2024-11-28 | End: 2024-12-05

## 2024-11-28 RX ORDER — BENZONATATE 100 MG/1
200 CAPSULE ORAL ONCE
Status: COMPLETED | OUTPATIENT
Start: 2024-11-28 | End: 2024-11-28

## 2024-11-28 RX ORDER — GUAIFENESIN 1200 MG/1
1200 TABLET, EXTENDED RELEASE ORAL EVERY 12 HOURS PRN
Qty: 14 TABLET | Refills: 0 | Status: SHIPPED | OUTPATIENT
Start: 2024-11-28 | End: 2024-12-05

## 2024-11-28 RX ORDER — NAPROXEN 500 MG/1
500 TABLET ORAL
Qty: 14 TABLET | Refills: 0 | Status: SHIPPED | OUTPATIENT
Start: 2024-11-28 | End: 2024-12-05

## 2024-11-28 RX ORDER — HYDROCHLOROTHIAZIDE 25 MG/1
25 TABLET ORAL ONCE
Status: COMPLETED | OUTPATIENT
Start: 2024-11-28 | End: 2024-11-28

## 2024-11-28 RX ORDER — LISINOPRIL AND HYDROCHLOROTHIAZIDE 20; 25 MG/1; MG/1
1 TABLET ORAL DAILY
Qty: 30 TABLET | Refills: 0 | Status: SHIPPED | OUTPATIENT
Start: 2024-11-28 | End: 2024-12-28

## 2024-11-28 ASSESSMENT — PAIN SCALES - GENERAL: PAINLEVEL_OUTOF10: 7

## 2024-11-28 ASSESSMENT — PAIN DESCRIPTION - PROGRESSION: CLINICAL_PROGRESSION: NOT CHANGED

## 2024-11-28 ASSESSMENT — PAIN DESCRIPTION - LOCATION: LOCATION: HEAD

## 2024-11-28 ASSESSMENT — PAIN - FUNCTIONAL ASSESSMENT: PAIN_FUNCTIONAL_ASSESSMENT: 0-10

## 2024-11-28 ASSESSMENT — PAIN DESCRIPTION - PAIN TYPE: TYPE: ACUTE PAIN

## 2024-11-28 ASSESSMENT — PAIN DESCRIPTION - DESCRIPTORS: DESCRIPTORS: ACHING

## 2024-11-28 ASSESSMENT — PAIN DESCRIPTION - FREQUENCY: FREQUENCY: CONSTANT/CONTINUOUS

## 2024-11-28 ASSESSMENT — PAIN DESCRIPTION - ONSET: ONSET: ONGOING

## 2024-11-28 NOTE — ED PROVIDER NOTES
"This is a 64-year-old female past medical history of ROHITH, hypertension, fatty liver disease who presents to the ED with flulike symptoms for the past 2 days.  She states over the past 2 days she has developed productive cough with yellow sputum, generalized malaise, back pain with coughing, mild shortness of breath, nasal congestion, rhinorrhea, generalized malaise, nausea as well as generalized bodyaches.  She denies any vomiting, chest pain, abdominal pain, diarrhea, urinary symptoms, known sick contacts, recent travel.  She does endorse a headache.  She has had headaches like this in the past.  She denies any associated visual changes (patient legally blind at baseline, however able to see light in her left eye and low vision in the right eye), weakness, paresthesias or areas of decree sensation.  She denies any recent falls, trauma, or injuries.  She denies any syncopal episodes.  Of note patient was hypertensive in triage at 174/91.  She states that she has been out of her lisinopril/hydrochlorothiazide for the past 2 days so she did not take it.  Additionally patient endorses chronic bilateral lower extremity edema which she states is unchanged from her baseline.  She denies any lower extremity pain.      History provided by:  Patient and medical records   used: No             Visit Vitals  /90   Pulse 71   Temp 36.8 °C (98.3 °F) (Oral)   Resp 18   Ht 1.626 m (5' 4\")   Wt 83.9 kg (185 lb)   SpO2 98%   BMI 31.76 kg/m²   OB Status Postmenopausal   Smoking Status Never   BSA 1.95 m²          Physical Exam     Physical exam:   General: Vitals noted, no distress. Afebrile.   EENT:  Hearing grossly intact. Normal phonation. MMM. Airway patient.  Right eye pupil round and reactive to light.  Left eye with scarring present.  EOMI. posterior oropharynx without erythema, edema, or exudate.  Uvula midline.  Neck: No midline tenderness or paraspinal tenderness. FROM.   Cardiac: Regular, rate, " rhythm. Normal S1 and S2.  No murmurs, gallops, rubs.   Pulmonary: Good air exchange. Lungs clear bilaterally. No wheezes, rhonchi, rales. No accessory muscle use.   Abdomen: Soft, nonsurgical. Nontender. No peritoneal signs. Normoactive bowel sounds.   Back: No CVA tenderness.  No point tenderness over the midline T or L-spine.  Bilateral paraspinal thoracic tenderness palpation without any obvious deformity or step-off.  No rashes or lesions.    Extremities: Full range of motion. Moves all extremities freely. No tenderness throughout extremities.  Bilateral mild nonpitting edema to lower extremities.  No discoloration.  DP and PT pulses full and equal bilaterally.  No posterior calf tenderness palpation.  Skin: No rash. Warm and Dry.   Neuro: No focal neurologic deficits. CN 2-12 grossly intact. Sensation equal bilaterally. No weakness.         Labs Reviewed   CBC WITH AUTO DIFFERENTIAL - Abnormal       Result Value    WBC 5.9      nRBC 0.0      RBC 3.64 (*)     Hemoglobin 11.3 (*)     Hematocrit 32.1 (*)     MCV 88      MCH 31.0      MCHC 35.2      RDW 11.4 (*)     Platelets 256      Neutrophils % 66.1      Immature Granulocytes %, Automated 0.3      Lymphocytes % 24.7      Monocytes % 7.1      Eosinophils % 1.5      Basophils % 0.3      Neutrophils Absolute 3.90      Immature Granulocytes Absolute, Automated 0.02      Lymphocytes Absolute 1.46      Monocytes Absolute 0.42      Eosinophils Absolute 0.09      Basophils Absolute 0.02     COMPREHENSIVE METABOLIC PANEL - Abnormal    Glucose 82      Sodium 139      Potassium 3.8      Chloride 106      Bicarbonate 26      Anion Gap 11      Urea Nitrogen 15      Creatinine 0.55      eGFR >90      Calcium 8.9      Albumin 3.8      Alkaline Phosphatase 71      Total Protein 7.4      AST 13      Bilirubin, Total 1.4 (*)     ALT 5 (*)    URINALYSIS WITH REFLEX CULTURE AND MICROSCOPIC - Abnormal    Color, Urine Light-Yellow      Appearance, Urine Clear      Specific Gravity,  Urine 1.017      pH, Urine 6.0      Protein, Urine NEGATIVE      Glucose, Urine Normal      Blood, Urine 0.03 (TRACE) (*)     Ketones, Urine NEGATIVE      Bilirubin, Urine NEGATIVE      Urobilinogen, Urine Normal      Nitrite, Urine NEGATIVE      Leukocyte Esterase, Urine NEGATIVE     URINALYSIS MICROSCOPIC WITH REFLEX CULTURE - Abnormal    WBC, Urine 1-5      RBC, Urine 3-5      Bacteria, Urine 1+ (*)     Mucus, Urine FEW     TROPONIN I, HIGH SENSITIVITY - Normal    Troponin I, High Sensitivity (CMC) <3      Narrative:     Less than 99th percentile of normal range cutoff-  Female and children under 18 years old <35 ng/L; Male <54 ng/L: Negative  Repeat testing should be performed if clinically indicated.     Female and children under 18 years old  ng/L; Male  ng/L:  Consistent with possible cardiac damage and possible increased clinical   risk. Serial measurements may help to assess extent of myocardial damage.     >120 ng/L: Consistent with cardiac damage, increased clinical risk and  myocardial infarction. Serial measurements may help assess extent of   myocardial damage.      NOTE: Children less than 1 year old may have higher baseline troponin   levels and results should be interpreted in conjunction with the overall   clinical context.    NOTE: Troponin I testing is performed using a different   testing methodology at [facility] than at other   [facility]. Direct result comparisons should only   be made within the same method.     B-TYPE NATRIURETIC PEPTIDE - Normal    BNP 35      Narrative:        <100 pg/mL - Heart failure unlikely  100-299 pg/mL - Intermediate probability of acute heart                  failure exacerbation. Correlate with clinical                  context and patient history.    >=300 pg/mL - Heart Failure likely. Correlate with clinical                  context and patient history.     Biotin interference may cause falsely decreased results. Patients taking  a Biotin dose of up to 5 mg/day should refrain from taking Biotin for 24 hours before sample  collection. Providers may contact their local laboratory for further information.   INFLUENZA A AND B PCR - Normal    Flu A Result Not Detected      Flu B Result Not Detected      Narrative:     This assay is an in vitro diagnostic multiplex nucleic acid amplification test for the detection and discrimination of Influenza A & B from nasopharyngeal specimens, and has been validated for use at [facility]. Negative results do not preclude Influenza A/B infections, and should not be used as the sole basis for diagnosis, treatment, or other management decisions. If Influenza A/B and RSV PCR results are negative, testing for Parainfluenza virus, Adenovirus and Metapneumovirus is routinely performed for [facility] pediatric oncology and intensive care inpatients, and is available on other patients by placing an add-on request.   SARS-COV-2 PCR - Normal    Coronavirus 2019, PCR Not Detected      Narrative:     This assay has received FDA Emergency Use Authorization (EUA) and is only authorized for the duration of time that circumstances exist to justify the authorization of the emergency use of in vitro diagnostic tests for the detection of SARS-CoV-2 virus and/or diagnosis of COVID-19 infection under section 564(b)(1) of the Act, 21 U.S.C. 360bbb-3(b)(1). This assay is an in vitro diagnostic nucleic acid amplification test for the qualitative detection of SARS-CoV-2 from nasopharyngeal specimens and has been validated for use at [facility]. Negative results do not preclude COVID-19 infections and should not be used as the sole basis for diagnosis, treatment, or other management decisions.     RSV PCR - Normal    RSV PCR Not Detected      Narrative:     This assay is an FDA-cleared, in vitro diagnostic nucleic acid amplification test for the detection of RSV from nasopharyngeal specimens,  and has been validated for use at [facility]. Negative results do not preclude RSV infections, and should not be used as the sole basis for diagnosis, treatment, or other management decisions. If Influenza A/B and RSV PCR results are negative, testing for Parainfluenza virus, Adenovirus and Metapneumovirus is routinely performed for pediatric oncology and intensive care inpatients at [facility], and is available on other patients by placing an add-on request.       URINALYSIS WITH REFLEX CULTURE AND MICROSCOPIC    Narrative:     The following orders were created for panel order Urinalysis with Reflex Culture and Microscopic.  Procedure                               Abnormality         Status                     ---------                               -----------         ------                     Urinalysis with Reflex C...[[id number]]  Abnormal            Final result               Extra Urine Gray Tube[[id number]]                            In process                   Please view results for these tests on the individual orders.   EXTRA URINE GRAY TUBE       XR chest 2 views   Final Result   No acute process.   Signed by Miki Hermosillo MD              ED Course & MDM     Medical Decision Making  This is a 64-year-old female with past medical history of ROHITH, fatty liver disease, hypertension who presents to the ED with 2 days of flulike symptoms.  Vitals that show patient was hypertensive at 174/91.  Vitals otherwise grossly unremarkable.  On examination patient is overall well-appearing.  Patient neurologically intact without deficits.  Lungs clear to auscultation.  No audible cardiac murmurs.  Patient did have reproducible bilateral paraspinal thoracic tenderness but had no point tenderness over the midline T or L-spine.  Patient did have lower extremity edema that was equal bilaterally and nonpitting.  No discoloration to her lower extremities.  DP and PT pulses full and equal bilaterally.  IV  established laboratory studies obtained.  Viral swabs ordered.  Chest x-ray ordered.  EKG obtained in triage and showed no acute ischemic changes.  UA obtained and was positive for only +1 bacteria, nitrate and leukocyte esterase negative.  Patient denies any urinary symptoms and I have low suspicion for true UTI at this time.  Viral swabs negative.  BNP normal at 35.  Troponin less than 3.  CBC and CMP both grossly unremarkable. chest x-ray showed no acute cardiopulmonary process.  I discussed these results with the patient.  Repeat vitals obtained and show patient's blood pressure improved to 154/90.  She did endorse mild improvement of her symptoms.  Based on patient description of her symptoms as well as unremarkable workup low suspicion for PE or ACS causing her symptoms at this time.  She is advised to follow-up with her primary care provider if her symptoms persist.  She was written prescriptions for Tessalon Perles, Mucinex, and naproxen for her symptoms at home.  She was given signs and symptoms that she should return to the ED with.  She was agreeable to this plan and had no further questions at time of discharge.    Amount and/or Complexity of Data Reviewed  Labs: ordered.  Radiology: ordered and independent interpretation performed.     Details: Chest x-ray without visualized pneumonia or pneumothorax  ECG/medicine tests: ordered. Decision-making details documented in ED Course.    Risk  OTC drugs.  Prescription drug management.         ED Course as of 11/28/24 1538   Thu Nov 28, 2024   1119 ECG 12 lead  On my independent interpretation EKG normal sinus rhythm at 61 bpm without any acute ST elevation or depression.  No T wave inversions.  Normal axis.  QT/QTc 412/414 [AW]      ED Course User Index  [AW] Aviva Lomax PA-C         Diagnoses as of 11/28/24 1538   Upper respiratory tract infection, unspecified type       Patient was seen independently    Procedures    ROGELIO Cohen, PA-C     Aviva  SIDDHARTH Lomax PA-C  11/28/24 1538

## 2024-11-28 NOTE — Clinical Note
Ny Martinez was seen and treated in our emergency department on 11/28/2024.  She may return to work on 11/30/2024.       If you have any questions or concerns, please don't hesitate to call.      Aviva Lomax PA-C

## 2024-11-29 LAB — HOLD SPECIMEN: NORMAL

## 2025-02-11 ENCOUNTER — CLINICAL SUPPORT (OUTPATIENT)
Dept: EMERGENCY MEDICINE | Facility: HOSPITAL | Age: 65
End: 2025-02-11
Payer: MEDICARE

## 2025-02-11 ENCOUNTER — HOSPITAL ENCOUNTER (EMERGENCY)
Facility: HOSPITAL | Age: 65
Discharge: HOME | End: 2025-02-11
Payer: MEDICARE

## 2025-02-11 ENCOUNTER — APPOINTMENT (OUTPATIENT)
Dept: RADIOLOGY | Facility: HOSPITAL | Age: 65
End: 2025-02-11
Payer: MEDICARE

## 2025-02-11 VITALS
WEIGHT: 184 LBS | SYSTOLIC BLOOD PRESSURE: 126 MMHG | OXYGEN SATURATION: 99 % | HEIGHT: 64 IN | RESPIRATION RATE: 16 BRPM | HEART RATE: 76 BPM | BODY MASS INDEX: 31.41 KG/M2 | TEMPERATURE: 97.2 F | DIASTOLIC BLOOD PRESSURE: 80 MMHG

## 2025-02-11 DIAGNOSIS — R10.9 FLANK PAIN: ICD-10-CM

## 2025-02-11 DIAGNOSIS — R32 URINARY INCONTINENCE, UNSPECIFIED TYPE: ICD-10-CM

## 2025-02-11 DIAGNOSIS — M79.89 LEG SWELLING: Primary | ICD-10-CM

## 2025-02-11 DIAGNOSIS — R07.9 CHEST PAIN, UNSPECIFIED TYPE: ICD-10-CM

## 2025-02-11 LAB
ALBUMIN SERPL BCP-MCNC: 3.8 G/DL (ref 3.4–5)
ALP SERPL-CCNC: 60 U/L (ref 33–136)
ALT SERPL W P-5'-P-CCNC: 15 U/L (ref 7–45)
ANION GAP SERPL CALC-SCNC: 10 MMOL/L (ref 10–20)
APPEARANCE UR: CLEAR
AST SERPL W P-5'-P-CCNC: 21 U/L (ref 9–39)
BASOPHILS # BLD AUTO: 0.02 X10*3/UL (ref 0–0.1)
BASOPHILS NFR BLD AUTO: 0.6 %
BILIRUB SERPL-MCNC: 1.2 MG/DL (ref 0–1.2)
BILIRUB UR STRIP.AUTO-MCNC: NEGATIVE MG/DL
BNP SERPL-MCNC: 24 PG/ML (ref 0–99)
BUN SERPL-MCNC: 13 MG/DL (ref 6–23)
CALCIUM SERPL-MCNC: 9 MG/DL (ref 8.6–10.6)
CARDIAC TROPONIN I PNL SERPL HS: 3 NG/L (ref 0–34)
CHLORIDE SERPL-SCNC: 107 MMOL/L (ref 98–107)
CO2 SERPL-SCNC: 30 MMOL/L (ref 21–32)
COLOR UR: YELLOW
CREAT SERPL-MCNC: 0.62 MG/DL (ref 0.5–1.05)
EGFRCR SERPLBLD CKD-EPI 2021: >90 ML/MIN/1.73M*2
EOSINOPHIL # BLD AUTO: 0.06 X10*3/UL (ref 0–0.7)
EOSINOPHIL NFR BLD AUTO: 1.7 %
ERYTHROCYTE [DISTWIDTH] IN BLOOD BY AUTOMATED COUNT: 11.3 % (ref 11.5–14.5)
GLUCOSE SERPL-MCNC: 99 MG/DL (ref 74–99)
GLUCOSE UR STRIP.AUTO-MCNC: ABNORMAL MG/DL
HCT VFR BLD AUTO: 32.2 % (ref 36–46)
HGB BLD-MCNC: 11.2 G/DL (ref 12–16)
IMM GRANULOCYTES # BLD AUTO: 0 X10*3/UL (ref 0–0.7)
IMM GRANULOCYTES NFR BLD AUTO: 0 % (ref 0–0.9)
KETONES UR STRIP.AUTO-MCNC: NEGATIVE MG/DL
LEUKOCYTE ESTERASE UR QL STRIP.AUTO: ABNORMAL
LYMPHOCYTES # BLD AUTO: 1.43 X10*3/UL (ref 1.2–4.8)
LYMPHOCYTES NFR BLD AUTO: 39.9 %
MAGNESIUM SERPL-MCNC: 2.28 MG/DL (ref 1.6–2.4)
MCH RBC QN AUTO: 31.1 PG (ref 26–34)
MCHC RBC AUTO-ENTMCNC: 34.8 G/DL (ref 32–36)
MCV RBC AUTO: 89 FL (ref 80–100)
MONOCYTES # BLD AUTO: 0.31 X10*3/UL (ref 0.1–1)
MONOCYTES NFR BLD AUTO: 8.7 %
MUCOUS THREADS #/AREA URNS AUTO: ABNORMAL /LPF
NEUTROPHILS # BLD AUTO: 1.76 X10*3/UL (ref 1.2–7.7)
NEUTROPHILS NFR BLD AUTO: 49.1 %
NITRITE UR QL STRIP.AUTO: NEGATIVE
NRBC BLD-RTO: 0 /100 WBCS (ref 0–0)
PH UR STRIP.AUTO: 5.5 [PH]
PLATELET # BLD AUTO: 235 X10*3/UL (ref 150–450)
POTASSIUM SERPL-SCNC: 4.3 MMOL/L (ref 3.5–5.3)
PROT SERPL-MCNC: 7 G/DL (ref 6.4–8.2)
PROT UR STRIP.AUTO-MCNC: ABNORMAL MG/DL
RBC # BLD AUTO: 3.6 X10*6/UL (ref 4–5.2)
RBC # UR STRIP.AUTO: ABNORMAL MG/DL
RBC #/AREA URNS AUTO: ABNORMAL /HPF
SODIUM SERPL-SCNC: 143 MMOL/L (ref 136–145)
SP GR UR STRIP.AUTO: 1.03
SQUAMOUS #/AREA URNS AUTO: ABNORMAL /HPF
UROBILINOGEN UR STRIP.AUTO-MCNC: NORMAL MG/DL
WBC # BLD AUTO: 3.6 X10*3/UL (ref 4.4–11.3)
WBC #/AREA URNS AUTO: ABNORMAL /HPF

## 2025-02-11 PROCEDURE — 71046 X-RAY EXAM CHEST 2 VIEWS: CPT | Performed by: RADIOLOGY

## 2025-02-11 PROCEDURE — 83880 ASSAY OF NATRIURETIC PEPTIDE: CPT

## 2025-02-11 PROCEDURE — 87086 URINE CULTURE/COLONY COUNT: CPT

## 2025-02-11 PROCEDURE — 85025 COMPLETE CBC W/AUTO DIFF WBC: CPT

## 2025-02-11 PROCEDURE — 83735 ASSAY OF MAGNESIUM: CPT

## 2025-02-11 PROCEDURE — 84484 ASSAY OF TROPONIN QUANT: CPT

## 2025-02-11 PROCEDURE — 2500000005 HC RX 250 GENERAL PHARMACY W/O HCPCS

## 2025-02-11 PROCEDURE — 99284 EMERGENCY DEPT VISIT MOD MDM: CPT

## 2025-02-11 PROCEDURE — 2500000001 HC RX 250 WO HCPCS SELF ADMINISTERED DRUGS (ALT 637 FOR MEDICARE OP)

## 2025-02-11 PROCEDURE — 99285 EMERGENCY DEPT VISIT HI MDM: CPT | Mod: 25

## 2025-02-11 PROCEDURE — 80053 COMPREHEN METABOLIC PANEL: CPT

## 2025-02-11 PROCEDURE — 81001 URINALYSIS AUTO W/SCOPE: CPT

## 2025-02-11 PROCEDURE — 36415 COLL VENOUS BLD VENIPUNCTURE: CPT

## 2025-02-11 PROCEDURE — 93005 ELECTROCARDIOGRAM TRACING: CPT

## 2025-02-11 PROCEDURE — 71046 X-RAY EXAM CHEST 2 VIEWS: CPT

## 2025-02-11 RX ORDER — IBUPROFEN 600 MG/1
600 TABLET ORAL EVERY 6 HOURS PRN
Qty: 30 TABLET | Refills: 0 | Status: SHIPPED | OUTPATIENT
Start: 2025-02-11 | End: 2025-02-21

## 2025-02-11 RX ORDER — IBUPROFEN 600 MG/1
600 TABLET ORAL ONCE
Status: COMPLETED | OUTPATIENT
Start: 2025-02-11 | End: 2025-02-11

## 2025-02-11 RX ORDER — LIDOCAINE 560 MG/1
1 PATCH PERCUTANEOUS; TOPICAL; TRANSDERMAL ONCE
Status: DISCONTINUED | OUTPATIENT
Start: 2025-02-11 | End: 2025-02-11 | Stop reason: HOSPADM

## 2025-02-11 RX ORDER — IBUPROFEN 600 MG/1
600 TABLET ORAL EVERY 6 HOURS PRN
Qty: 30 TABLET | Refills: 0 | Status: SHIPPED | OUTPATIENT
Start: 2025-02-11 | End: 2025-02-11

## 2025-02-11 RX ADMIN — IBUPROFEN 600 MG: 600 TABLET, FILM COATED ORAL at 18:01

## 2025-02-11 RX ADMIN — IBUPROFEN 600 MG: 600 TABLET, FILM COATED ORAL at 12:48

## 2025-02-11 RX ADMIN — LIDOCAINE 1 PATCH: 4 PATCH TOPICAL at 13:24

## 2025-02-11 ASSESSMENT — PAIN SCALES - GENERAL: PAINLEVEL_OUTOF10: 3

## 2025-02-11 ASSESSMENT — PAIN - FUNCTIONAL ASSESSMENT: PAIN_FUNCTIONAL_ASSESSMENT: 0-10

## 2025-02-11 ASSESSMENT — COLUMBIA-SUICIDE SEVERITY RATING SCALE - C-SSRS
1. IN THE PAST MONTH, HAVE YOU WISHED YOU WERE DEAD OR WISHED YOU COULD GO TO SLEEP AND NOT WAKE UP?: NO
2. HAVE YOU ACTUALLY HAD ANY THOUGHTS OF KILLING YOURSELF?: NO
6. HAVE YOU EVER DONE ANYTHING, STARTED TO DO ANYTHING, OR PREPARED TO DO ANYTHING TO END YOUR LIFE?: NO

## 2025-02-11 NOTE — ED TRIAGE NOTES
Presents with RLE pain intermittently x1 week, flank pain and frequent urination. H/o hypertension, fatty liver disease

## 2025-02-11 NOTE — ED PROVIDER NOTES
"HPI   Chief Complaint   Patient presents with    Flank Pain    Leg Swelling       Patient is a 64-year-old female with a past medical history significant for HTN and nonalcoholic fatty liver disease presented to the ED with multiple concerns.  Patient endorses bilateral lower extremity and feet swelling for many weeks which has been recently worsening.  She states she cannot feel her feet when getting up out of a chair.  She states she feels as though she is dragging her feet to ambulate and has been using her mother's cane for assistance.  In addition, patient endorses urinary frequency and right-sided flank pain ongoing for \"a minute.\"  She states she is sure she does not have a UTI because she has been tested in the past.  She states she has been having some issues with urinary incontinence.  Patient also states she has been experiencing intermittent chest pain which has occurred 3 times in the past 2 weeks.  She states it is more severe when waking up in the morning and she feels as though she is unable to move when it occurs.  The time she has noticed it has been when she was leaning up against her kitchen island as well as sleeping on a chair.  She states it resolved spontaneously.  She denies feeling any chest pain, shortness of breath, lightheadedness, dizziness, diaphoresis, radiating pain, or nausea/vomiting at this time.  Patient does endorse a family history of malignancy and CHF.              Patient History   Past Medical History:   Diagnosis Date    Allergic rhinitis, unspecified 04/12/2018    Allergic sinusitis    Cellulitis of left upper limb 12/05/2018    Cellulitis of left upper extremity    Cellulitis, unspecified     Cellulitis    Disease of jaws, unspecified     Disorder of jaw    Disorder of pigmentation, unspecified 04/09/2018    Discoloration of skin of toe    Displaced fracture of fourth metatarsal bone, left foot, subsequent encounter for fracture with routine healing 05/10/2018    Closed " displaced fracture of fourth metatarsal bone of left foot with routine healing, subsequent encounter    Displaced fracture of second metatarsal bone, left foot, subsequent encounter for fracture with routine healing 05/10/2018    Closed displaced fracture of second metatarsal bone of left foot with routine healing, subsequent encounter    Displaced fracture of third metatarsal bone, left foot, subsequent encounter for fracture with routine healing 05/10/2018    Closed displaced fracture of third metatarsal bone of left foot with routine healing, subsequent encounter    Effusion, left ankle 04/12/2018    Edema of left ankle    Encounter for follow-up examination after completed treatment for conditions other than malignant neoplasm 04/15/2019    Hospital discharge follow-up    Encounter for other screening for malignant neoplasm of breast 04/12/2018    Breast cancer screening    Encounter for screening for diabetes mellitus 02/16/2015    Screening for diabetes mellitus    Encounter for screening mammogram for malignant neoplasm of breast     Visit for screening mammogram    Nonscarring hair loss, unspecified 02/16/2015    Hair loss    Osteoarthritis of knee, unspecified     Osteoarthritis of knee    Pain in left foot 05/10/2018    Acute foot pain, left    Pain in unspecified hip 07/08/2015    Hip pain    Pain in unspecified limb     Limb pain    Pain, unspecified 01/15/2019    Total body pain    Personal history of other diseases of the digestive system 03/17/2017    History of gastroesophageal reflux (GERD)    Personal history of other endocrine, nutritional and metabolic disease 05/09/2018    History of obesity    Personal history of other specified conditions 07/17/2020    History of pituitary neoplasm    Personal history of other specified conditions 06/09/2017    History of chest pain    Personal history of other specified conditions 08/28/2015    History of palpitations    Personal history of other specified  conditions 05/10/2018    History of headache    Personal history of other specified conditions 08/27/2015    History of abdominal pain    Personal history of other specified conditions     History of fatigue    Personal history of other specified conditions     History of headache    Personal history of other specified conditions 08/28/2015    History of insomnia    Snoring     Snoring    Sprain of joints and ligaments of unspecified parts of neck, initial encounter 07/17/2013    Neck sprain    Unspecified fracture of left foot, initial encounter for closed fracture 09/12/2017    Foot fracture, left    Unspecified lump in the left breast, unspecified quadrant     Lump of left breast     Past Surgical History:   Procedure Laterality Date    APPENDECTOMY  07/17/2013    Appendectomy    CORONARY ARTERY BYPASS GRAFT  07/17/2013    Coronary Artery Surgery    MR HEAD ANGIO WO IV CONTRAST  8/6/2020    MR HEAD ANGIO WO IV CONTRAST 8/6/2020 [facility] [place] [facility]    MR NECK ANGIO WO IV CONTRAST  8/6/2020    MR NECK ANGIO WO IV CONTRAST 8/6/2020 [facility] [place] [facility]     No family history on file.  Social History     Tobacco Use    Smoking status: Never    Smokeless tobacco: Never   Substance Use Topics    Alcohol use: Yes    Drug use: Never       Physical Exam   ED Triage Vitals [02/11/25 1201]   Temperature Heart Rate Respirations BP   36.2 °C (97.2 °F) 76 16 126/80      Pulse Ox Temp src Heart Rate Source Patient Position   99 % -- -- --      BP Location FiO2 (%)     -- --       Physical Exam  Vitals reviewed.   Constitutional:       General: She is not in acute distress.     Appearance: She is not ill-appearing.   Cardiovascular:      Rate and Rhythm: Normal rate.   Pulmonary:      Effort: Pulmonary effort is normal. No respiratory distress.      Breath sounds: Normal breath sounds.   Abdominal:      General: Bowel sounds are normal.      Palpations: Abdomen is soft.      Tenderness: There is no abdominal tenderness. There is  no right CVA tenderness, left CVA tenderness, guarding or rebound.      Comments: No flank TTP   Musculoskeletal:      Comments: 1+ pitting edema of the bilateral lower extremities.  No warmth, erythema, or tenderness.  No open wounds.  Full ROM.  Able to ambulate.   Skin:     General: Skin is warm and dry.   Neurological:      General: No focal deficit present.      Mental Status: She is alert.           ED Course & MDM   Diagnoses as of 02/11/25 1746   Leg swelling   Urinary incontinence, unspecified type   Flank pain   Chest pain, unspecified type     Labs Reviewed   CBC WITH AUTO DIFFERENTIAL - Abnormal       Result Value    WBC 3.6 (*)     nRBC 0.0      RBC 3.60 (*)     Hemoglobin 11.2 (*)     Hematocrit 32.2 (*)     MCV 89      MCH 31.1      MCHC 34.8      RDW 11.3 (*)     Platelets 235      Neutrophils % 49.1      Immature Granulocytes %, Automated 0.0      Lymphocytes % 39.9      Monocytes % 8.7      Eosinophils % 1.7      Basophils % 0.6      Neutrophils Absolute 1.76      Immature Granulocytes Absolute, Automated 0.00      Lymphocytes Absolute 1.43      Monocytes Absolute 0.31      Eosinophils Absolute 0.06      Basophils Absolute 0.02     URINALYSIS WITH REFLEX CULTURE AND MICROSCOPIC - Abnormal    Color, Urine Yellow      Appearance, Urine Clear      Specific Gravity, Urine 1.031      pH, Urine 5.5      Protein, Urine 20 (TRACE)      Glucose, Urine 50 (TRACE) (*)     Blood, Urine 0.03 (TRACE) (*)     Ketones, Urine NEGATIVE      Bilirubin, Urine NEGATIVE      Urobilinogen, Urine Normal      Nitrite, Urine NEGATIVE      Leukocyte Esterase, Urine 75 Lauren/uL (*)    MICROSCOPIC ONLY, URINE - Abnormal    WBC, Urine 1-5      RBC, Urine 6-10 (*)     Squamous Epithelial Cells, Urine 1-9 (SPARSE)      Mucus, Urine 2+     COMPREHENSIVE METABOLIC PANEL - Normal    Glucose 99      Sodium 143      Potassium 4.3      Chloride 107      Bicarbonate 30      Anion Gap 10      Urea Nitrogen 13      Creatinine 0.62      eGFR  >90      Calcium 9.0      Albumin 3.8      Alkaline Phosphatase 60      Total Protein 7.0      AST 21      Bilirubin, Total 1.2      ALT 15     MAGNESIUM - Normal    Magnesium 2.28     TROPONIN I, HIGH SENSITIVITY - Normal    Troponin I, High Sensitivity (CMC) 3      Narrative:     Less than 99th percentile of normal range cutoff-  Female and children under 18 years old <35 ng/L; Male <54 ng/L: Negative  Repeat testing should be performed if clinically indicated.     Female and children under 18 years old  ng/L; Male  ng/L:  Consistent with possible cardiac damage and possible increased clinical   risk. Serial measurements may help to assess extent of myocardial damage.     >120 ng/L: Consistent with cardiac damage, increased clinical risk and  myocardial infarction. Serial measurements may help assess extent of   myocardial damage.      NOTE: Children less than 1 year old may have higher baseline troponin   levels and results should be interpreted in conjunction with the overall   clinical context.    NOTE: Troponin I testing is performed using a different   testing methodology at [facility] than at other   [facility]. Direct result comparisons should only   be made within the same method.     B-TYPE NATRIURETIC PEPTIDE - Normal    BNP 24      Narrative:        <100 pg/mL - Heart failure unlikely  100-299 pg/mL - Intermediate probability of acute heart                  failure exacerbation. Correlate with clinical                  context and patient history.    >=300 pg/mL - Heart Failure likely. Correlate with clinical                  context and patient history.     Biotin interference may cause falsely decreased results. Patients taking a Biotin dose of up to 5 mg/day should refrain from taking Biotin for 24 hours before sample  collection. Providers may contact their local laboratory for further information.   URINE CULTURE   URINALYSIS WITH REFLEX CULTURE AND MICROSCOPIC     Narrative:     The following orders were created for panel order Urinalysis with Reflex Culture and Microscopic.  Procedure                               Abnormality         Status                     ---------                               -----------         ------                     Urinalysis with Reflex C...[[id number]]  Abnormal            Final result               Extra Urine Gray Tube[[id number]]                            In process                   Please view results for these tests on the individual orders.   EXTRA URINE GRAY TUBE     XR chest 2 views   Final Result   1. No acute cardiopulmonary process.        MACRO:   None.        Signed by: Su Brewster 2/11/2025 2:01 PM   Dictation workstation:   [id number]                  No data recorded     [place] Coma Scale Score: 15 (02/11/25 1200 : Sheila Buck RN)                           Medical Decision Making  Patient is a 64-year-old female with a past medical history significant for HTN and nonalcoholic fatty liver disease presented to the ED with multiple concerns.  History was obtained from the patient.  Endorses worsening bilateral lower extremity swelling which is worse when getting up out of a chair.  Is having difficulty ambulating secondary to the swelling and discomfort.  Has also had ongoing urinary frequency and right-sided flank pain.  States she is having some urinary incontinence.  Also admits to intermittent chest pain occurring 3 times in the last 2 weeks.  Resolved spontaneously.  Denies other associated symptoms, as noted in HPI.  Endorses family history of malignancy and CHF.  On physical exam, patient is sitting comfortably and in no apparent distress.  Lungs CTA bilaterally without increased work of breathing or extrapulmonary sounds.  Abdomen soft and nontender with normal bowel sounds.  No rebound or guarding.  Negative CVA tenderness bilaterally.  No flank TTP.  There is 1+ pitting edema of the bilateral lower  extremities.  No warmth, erythema, or tenderness.  No open wounds.  Full ROM and able to ambulate.  Remainder of exam as noted above.  Patient is afebrile with stable vital signs.    Review of patient's chart note from November 28 reveals patient has chronic bilateral lower extremity edema at her baseline.    Differential diagnosis considered includes ACS, CHF, venous insufficiency, edema, UTI, or urinary incontinence.  Very broad workup obtained including EKG, CBC, CMP, mag, troponin, BNP, urinalysis, and chest x-ray.  Patient treated with a dose of ibuprofen as well as a lidocaine patch.  CBC with slight leukopenia of 3.6.  H&H slightly low at 11.2 and 32.2, although at patient's baseline.  CMP, magnesium, troponin, and BNP all normal.  Urinalysis positive for glucose, blood, RBCs, and leukocytes.  Squamous epithelial cells and mucus present likely indicating contamination.  Will forego UTI treatment at this time.  Chest x-ray without evidence of acute cardiopulmonary process.  All of patient's workup very reassuring and she was informed of these results.  Patient did remain stable and ready for discharge.  Recommended she go to a local drugstore and purchase a cane and compression stockings.  Also emphasized the importance she sit with her legs elevated.  She requested a prescription for ibuprofen which was written.  She is otherwise to take all of her medications as prescribed.  She does not have other specialty providers at this time, and therefore I did place referrals to primary care, urology, and gynecology.  I would like her to have close outpatient follow up.  Patient is agreeable to this.  She was discharged in stable condition with all of her questions answered to satisfaction.        Procedure  Procedures     Charlene Bradford PA-C  02/11/25 1800

## 2025-02-11 NOTE — DISCHARGE INSTRUCTIONS
The entire workup we obtained here including your EKG, labs, urine sample, and chest x-ray were all normal.  There was no evidence of any abnormalities of your heart, kidneys, urine, or other organ function/blood levels/electrolytes.  This is very reassuring.  I wrote you a prescription for ibuprofen/Motrin which you can take every 6 hours as needed for pain.  You can purchase a cane and compression stockings from a local drug store which  would highly recommend.  It is also extremely important to sit with your legs elevated to heart level throughout the day.  Otherwise, I would continue what ever medications you are currently taking.  I placed internal referrals to a few different medical teams.  This includes primary care, gynecology, and urology.  They should be able to address and further manage any of your concerns.  The scheduling team will be reaching out to you in the next few days to make all of those appointments.

## 2025-02-12 LAB
BACTERIA UR CULT: NORMAL
HOLD SPECIMEN: NORMAL

## 2025-02-20 ENCOUNTER — HOSPITAL ENCOUNTER (EMERGENCY)
Facility: HOSPITAL | Age: 65
Discharge: HOME | End: 2025-02-21
Attending: STUDENT IN AN ORGANIZED HEALTH CARE EDUCATION/TRAINING PROGRAM
Payer: MEDICARE

## 2025-02-20 DIAGNOSIS — I25.9 CHEST PAIN DUE TO MYOCARDIAL ISCHEMIA, UNSPECIFIED ISCHEMIC CHEST PAIN TYPE: Primary | ICD-10-CM

## 2025-02-20 PROCEDURE — 99285 EMERGENCY DEPT VISIT HI MDM: CPT | Performed by: STUDENT IN AN ORGANIZED HEALTH CARE EDUCATION/TRAINING PROGRAM

## 2025-02-21 ENCOUNTER — APPOINTMENT (OUTPATIENT)
Dept: RADIOLOGY | Facility: HOSPITAL | Age: 65
End: 2025-02-21
Payer: MEDICARE

## 2025-02-21 ENCOUNTER — APPOINTMENT (OUTPATIENT)
Dept: CARDIOLOGY | Facility: HOSPITAL | Age: 65
End: 2025-02-21
Payer: MEDICARE

## 2025-02-21 VITALS
WEIGHT: 185 LBS | OXYGEN SATURATION: 98 % | BODY MASS INDEX: 31.58 KG/M2 | RESPIRATION RATE: 21 BRPM | SYSTOLIC BLOOD PRESSURE: 133 MMHG | TEMPERATURE: 97.9 F | HEIGHT: 64 IN | DIASTOLIC BLOOD PRESSURE: 86 MMHG | HEART RATE: 58 BPM

## 2025-02-21 LAB
ALBUMIN SERPL BCP-MCNC: 3.9 G/DL (ref 3.4–5)
ALP SERPL-CCNC: 60 U/L (ref 33–136)
ALT SERPL W P-5'-P-CCNC: 17 U/L (ref 7–45)
ANION GAP SERPL CALC-SCNC: 10 MMOL/L (ref 10–20)
AST SERPL W P-5'-P-CCNC: 19 U/L (ref 9–39)
ATRIAL RATE: 63 BPM
BASOPHILS # BLD AUTO: 0.02 X10*3/UL (ref 0–0.1)
BASOPHILS NFR BLD AUTO: 0.4 %
BILIRUB SERPL-MCNC: 0.9 MG/DL (ref 0–1.2)
BUN SERPL-MCNC: 19 MG/DL (ref 6–23)
CALCIUM SERPL-MCNC: 8.7 MG/DL (ref 8.6–10.3)
CARDIAC TROPONIN I PNL SERPL HS: 4 NG/L (ref 0–13)
CARDIAC TROPONIN I PNL SERPL HS: 5 NG/L (ref 0–13)
CHLORIDE SERPL-SCNC: 108 MMOL/L (ref 98–107)
CO2 SERPL-SCNC: 25 MMOL/L (ref 21–32)
CREAT SERPL-MCNC: 0.56 MG/DL (ref 0.5–1.05)
D DIMER PPP FEU-MCNC: 412 NG/ML FEU
EGFRCR SERPLBLD CKD-EPI 2021: >90 ML/MIN/1.73M*2
EOSINOPHIL # BLD AUTO: 0.04 X10*3/UL (ref 0–0.7)
EOSINOPHIL NFR BLD AUTO: 0.8 %
ERYTHROCYTE [DISTWIDTH] IN BLOOD BY AUTOMATED COUNT: 11.5 % (ref 11.5–14.5)
GLUCOSE SERPL-MCNC: 105 MG/DL (ref 74–99)
HCT VFR BLD AUTO: 33.6 % (ref 36–46)
HGB BLD-MCNC: 11.2 G/DL (ref 12–16)
IMM GRANULOCYTES # BLD AUTO: 0.01 X10*3/UL (ref 0–0.7)
IMM GRANULOCYTES NFR BLD AUTO: 0.2 % (ref 0–0.9)
INR PPP: 1.1 (ref 0.9–1.1)
LYMPHOCYTES # BLD AUTO: 1.59 X10*3/UL (ref 1.2–4.8)
LYMPHOCYTES NFR BLD AUTO: 32.9 %
MAGNESIUM SERPL-MCNC: 2.25 MG/DL (ref 1.6–2.4)
MCH RBC QN AUTO: 31 PG (ref 26–34)
MCHC RBC AUTO-ENTMCNC: 33.3 G/DL (ref 32–36)
MCV RBC AUTO: 93 FL (ref 80–100)
MONOCYTES # BLD AUTO: 0.39 X10*3/UL (ref 0.1–1)
MONOCYTES NFR BLD AUTO: 8.1 %
NEUTROPHILS # BLD AUTO: 2.79 X10*3/UL (ref 1.2–7.7)
NEUTROPHILS NFR BLD AUTO: 57.6 %
NRBC BLD-RTO: 0 /100 WBCS (ref 0–0)
P AXIS: 53 DEGREES
P OFFSET: 172 MS
P ONSET: 119 MS
PLATELET # BLD AUTO: 222 X10*3/UL (ref 150–450)
POTASSIUM SERPL-SCNC: 3.8 MMOL/L (ref 3.5–5.3)
PR INTERVAL: 192 MS
PROT SERPL-MCNC: 7.4 G/DL (ref 6.4–8.2)
PROTHROMBIN TIME: 12.2 SECONDS (ref 9.8–12.8)
Q ONSET: 215 MS
QRS COUNT: 10 BEATS
QRS DURATION: 80 MS
QT INTERVAL: 412 MS
QTC CALCULATION(BAZETT): 421 MS
QTC FREDERICIA: 418 MS
R AXIS: -39 DEGREES
RBC # BLD AUTO: 3.61 X10*6/UL (ref 4–5.2)
SODIUM SERPL-SCNC: 139 MMOL/L (ref 136–145)
T AXIS: 31 DEGREES
T OFFSET: 421 MS
VENTRICULAR RATE: 63 BPM
WBC # BLD AUTO: 4.8 X10*3/UL (ref 4.4–11.3)

## 2025-02-21 PROCEDURE — 85610 PROTHROMBIN TIME: CPT

## 2025-02-21 PROCEDURE — 71045 X-RAY EXAM CHEST 1 VIEW: CPT | Mod: FOREIGN READ | Performed by: RADIOLOGY

## 2025-02-21 PROCEDURE — 71045 X-RAY EXAM CHEST 1 VIEW: CPT

## 2025-02-21 PROCEDURE — 2500000005 HC RX 250 GENERAL PHARMACY W/O HCPCS: Performed by: STUDENT IN AN ORGANIZED HEALTH CARE EDUCATION/TRAINING PROGRAM

## 2025-02-21 PROCEDURE — 83735 ASSAY OF MAGNESIUM: CPT

## 2025-02-21 PROCEDURE — 80053 COMPREHEN METABOLIC PANEL: CPT

## 2025-02-21 PROCEDURE — 84484 ASSAY OF TROPONIN QUANT: CPT

## 2025-02-21 PROCEDURE — 93005 ELECTROCARDIOGRAM TRACING: CPT

## 2025-02-21 PROCEDURE — 2500000001 HC RX 250 WO HCPCS SELF ADMINISTERED DRUGS (ALT 637 FOR MEDICARE OP): Performed by: STUDENT IN AN ORGANIZED HEALTH CARE EDUCATION/TRAINING PROGRAM

## 2025-02-21 PROCEDURE — 85025 COMPLETE CBC W/AUTO DIFF WBC: CPT

## 2025-02-21 PROCEDURE — 36415 COLL VENOUS BLD VENIPUNCTURE: CPT | Performed by: STUDENT IN AN ORGANIZED HEALTH CARE EDUCATION/TRAINING PROGRAM

## 2025-02-21 PROCEDURE — 36415 COLL VENOUS BLD VENIPUNCTURE: CPT

## 2025-02-21 PROCEDURE — 85379 FIBRIN DEGRADATION QUANT: CPT | Performed by: STUDENT IN AN ORGANIZED HEALTH CARE EDUCATION/TRAINING PROGRAM

## 2025-02-21 RX ORDER — ACETAMINOPHEN 325 MG/1
650 TABLET ORAL ONCE
Status: COMPLETED | OUTPATIENT
Start: 2025-02-21 | End: 2025-02-21

## 2025-02-21 RX ORDER — LIDOCAINE 560 MG/1
1 PATCH PERCUTANEOUS; TOPICAL; TRANSDERMAL ONCE
Status: DISCONTINUED | OUTPATIENT
Start: 2025-02-21 | End: 2025-02-21 | Stop reason: HOSPADM

## 2025-02-21 RX ADMIN — ACETAMINOPHEN 650 MG: 325 TABLET ORAL at 02:16

## 2025-02-21 RX ADMIN — LIDOCAINE 4% 1 PATCH: 40 PATCH TOPICAL at 02:15

## 2025-02-21 ASSESSMENT — HEART SCORE
TROPONIN: LESS THAN OR EQUAL TO NORMAL LIMIT
AGE: 45-64
ECG: NORMAL
HEART SCORE: 2
RISK FACTORS: 1-2 RISK FACTORS
HISTORY: SLIGHTLY SUSPICIOUS

## 2025-02-21 ASSESSMENT — LIFESTYLE VARIABLES
HAVE PEOPLE ANNOYED YOU BY CRITICIZING YOUR DRINKING: NO
TOTAL SCORE: 0
EVER HAD A DRINK FIRST THING IN THE MORNING TO STEADY YOUR NERVES TO GET RID OF A HANGOVER: NO
EVER FELT BAD OR GUILTY ABOUT YOUR DRINKING: NO
HAVE YOU EVER FELT YOU SHOULD CUT DOWN ON YOUR DRINKING: NO

## 2025-02-21 ASSESSMENT — PAIN - FUNCTIONAL ASSESSMENT: PAIN_FUNCTIONAL_ASSESSMENT: 0-10

## 2025-02-21 ASSESSMENT — PAIN DESCRIPTION - LOCATION: LOCATION: CHEST

## 2025-02-21 ASSESSMENT — PAIN SCALES - GENERAL: PAINLEVEL_OUTOF10: 5 - MODERATE PAIN

## 2025-02-21 NOTE — ED PROVIDER NOTES
EMERGENCY DEPARTMENT ENCOUNTER      Pt Name: Ny Martinez  MRN: [id number]  Birthdate 1960  Date of evaluation: 2/20/2025  Provider: Jamil Mathews MD    CHIEF COMPLAINT       Chief Complaint   Patient presents with    Chest Pain         HISTORY OF PRESENT ILLNESS    HPI  Patient is a 64-year-old female with history of HTN, NAFLD, ROHITH presenting with chest pain.  This started shortly prior to presentation.  Pain was 3/10, dull, substernal, without consistent alleviating or exacerbating factors.  There was shortness of breath at the time which has  since resolved.  Notably, earlier today, she slipped and fell on the ice, she has a small abrasion under her chin and abrasion to the right wrist.  She did not lose consciousness, denies nausea, vomiting or visual changes.  She has a minor headache.  Nursing Notes were reviewed.    PAST MEDICAL HISTORY     Past Medical History:   Diagnosis Date    Allergic rhinitis, unspecified 04/12/2018    Allergic sinusitis    Cellulitis of left upper limb 12/05/2018    Cellulitis of left upper extremity    Cellulitis, unspecified     Cellulitis    Disease of jaws, unspecified     Disorder of jaw    Disorder of pigmentation, unspecified 04/09/2018    Discoloration of skin of toe    Displaced fracture of fourth metatarsal bone, left foot, subsequent encounter for fracture with routine healing 05/10/2018    Closed displaced fracture of fourth metatarsal bone of left foot with routine healing, subsequent encounter    Displaced fracture of second metatarsal bone, left foot, subsequent encounter for fracture with routine healing 05/10/2018    Closed displaced fracture of second metatarsal bone of left foot with routine healing, subsequent encounter    Displaced fracture of third metatarsal bone, left foot, subsequent encounter for fracture with routine healing 05/10/2018    Closed displaced fracture of third metatarsal bone of left foot with routine healing, subsequent encounter     Effusion, left ankle 04/12/2018    Edema of left ankle    Encounter for follow-up examination after completed treatment for conditions other than malignant neoplasm 04/15/2019    Hospital discharge follow-up    Encounter for other screening for malignant neoplasm of breast 04/12/2018    Breast cancer screening    Encounter for screening for diabetes mellitus 02/16/2015    Screening for diabetes mellitus    Encounter for screening mammogram for malignant neoplasm of breast     Visit for screening mammogram    Nonscarring hair loss, unspecified 02/16/2015    Hair loss    Osteoarthritis of knee, unspecified     Osteoarthritis of knee    Pain in left foot 05/10/2018    Acute foot pain, left    Pain in unspecified hip 07/08/2015    Hip pain    Pain in unspecified limb     Limb pain    Pain, unspecified 01/15/2019    Total body pain    Personal history of other diseases of the digestive system 03/17/2017    History of gastroesophageal reflux (GERD)    Personal history of other endocrine, nutritional and metabolic disease 05/09/2018    History of obesity    Personal history of other specified conditions 07/17/2020    History of pituitary neoplasm    Personal history of other specified conditions 06/09/2017    History of chest pain    Personal history of other specified conditions 08/28/2015    History of palpitations    Personal history of other specified conditions 05/10/2018    History of headache    Personal history of other specified conditions 08/27/2015    History of abdominal pain    Personal history of other specified conditions     History of fatigue    Personal history of other specified conditions     History of headache    Personal history of other specified conditions 08/28/2015    History of insomnia    Snoring     Snoring    Sprain of joints and ligaments of unspecified parts of neck, initial encounter 07/17/2013    Neck sprain    Unspecified fracture of left foot, initial encounter for closed fracture  09/12/2017    Foot fracture, left    Unspecified lump in the left breast, unspecified quadrant     Lump of left breast         SURGICAL HISTORY       Past Surgical History:   Procedure Laterality Date    APPENDECTOMY  07/17/2013    Appendectomy    CORONARY ARTERY BYPASS GRAFT  07/17/2013    Coronary Artery Surgery    MR HEAD ANGIO WO IV CONTRAST  8/6/2020    MR HEAD ANGIO WO IV CONTRAST 8/6/2020 [facility] [place] [facility]    MR NECK ANGIO WO IV CONTRAST  8/6/2020    MR NECK ANGIO WO IV CONTRAST 8/6/2020 [facility] [place] [facility]         CURRENT MEDICATIONS       Previous Medications    ACETAMINOPHEN (TYLENOL) 325 MG TABLET    Take 2 tablets (650 mg) by mouth every 6 hours if needed for mild pain (1 - 3) or fever (temp greater than 38.0 C).    CYCLOBENZAPRINE (FLEXERIL) 10 MG TABLET    Take 1 tablet (10 mg) by mouth 3 times a day as needed for muscle spasms (Pain).    IBUPROFEN 600 MG TABLET    Take 1 tablet (600 mg) by mouth every 8 hours if needed for mild pain (1 - 3) or fever (temp greater than 38.0 C).    IBUPROFEN 600 MG TABLET    Take 1 tablet (600 mg) by mouth every 6 hours if needed for mild pain (1 - 3) for up to 10 days.    LIDOCAINE (LIDODERM) 5 % PATCH    Place 1 patch over 12 hours on the skin once daily. Remove & discard patch within 12 hours or as directed by MD.    LISINOPRIL-HYDROCHLOROTHIAZIDE 20-25 MG TABLET    Take 1 tablet by mouth once daily.    METHOCARBAMOL (ROBAXIN) 500 MG TABLET    Take 1 tablet (500 mg) by mouth 3 times a day for 7 days. As needed for pain    ORPHENADRINE (NORFLEX) 100 MG 12 HR TABLET    Take 1 tablet (100 mg) by mouth 2 times a day as needed for muscle spasms for up to 10 days. Do not crush, chew, or split.       ALLERGIES     Banana, Cantaloupe, Iodinated contrast media, Kiwi, Latex, Pineapple, Shellfish derived, and Hydrochlorothiazide    FAMILY HISTORY     No family history on file.       SOCIAL HISTORY       Social History     Socioeconomic History    Marital status: Single    Tobacco Use    Smoking status: Never    Smokeless tobacco: Never   Substance and Sexual Activity    Alcohol use: Yes    Drug use: Never       SCREENINGS                        PHYSICAL EXAM    (up to 7 for level 4, 8 or more for level 5)     ED Triage Vitals   Temperature Heart Rate Respirations BP   02/21/25 0042 02/21/25 0030 02/21/25 0030 02/21/25 0030   36.6 °C (97.9 °F) 57 15 (!) 170/101      Pulse Ox Temp Source Heart Rate Source Patient Position   02/21/25 0030 02/21/25 0042 -- 02/21/25 0042   99 % Temporal  Lying      BP Location FiO2 (%)     02/21/25 0042 --     Right arm        Physical Exam  Constitutional:       General: She is not in acute distress.     Appearance: She is not toxic-appearing.   HENT:      Head: Normocephalic and atraumatic.   Eyes:      Extraocular Movements: Extraocular movements intact.      Pupils: Pupils are equal, round, and reactive to light.   Cardiovascular:      Rate and Rhythm: Normal rate and regular rhythm.      Heart sounds: Normal heart sounds.   Pulmonary:      Effort: Pulmonary effort is normal.      Breath sounds: Normal breath sounds.   Abdominal:      Palpations: Abdomen is soft.      Tenderness: There is no abdominal tenderness.   Musculoskeletal:      Cervical back: Normal range of motion and neck supple.      Right lower leg: No tenderness.      Left lower leg: No tenderness.   Skin:     General: Skin is warm and dry.      Capillary Refill: Capillary refill takes less than 2 seconds.   Neurological:      General: No focal deficit present.          DIAGNOSTIC RESULTS     LABS:  Labs Reviewed   CBC WITH AUTO DIFFERENTIAL - Abnormal       Result Value    WBC 4.8      nRBC 0.0      RBC 3.61 (*)     Hemoglobin 11.2 (*)     Hematocrit 33.6 (*)     MCV 93      MCH 31.0      MCHC 33.3      RDW 11.5      Platelets 222      Neutrophils % 57.6      Immature Granulocytes %, Automated 0.2      Lymphocytes % 32.9      Monocytes % 8.1      Eosinophils % 0.8      Basophils % 0.4       Neutrophils Absolute 2.79      Immature Granulocytes Absolute, Automated 0.01      Lymphocytes Absolute 1.59      Monocytes Absolute 0.39      Eosinophils Absolute 0.04      Basophils Absolute 0.02     COMPREHENSIVE METABOLIC PANEL - Abnormal    Glucose 105 (*)     Sodium 139      Potassium 3.8      Chloride 108 (*)     Bicarbonate 25      Anion Gap 10      Urea Nitrogen 19      Creatinine 0.56      eGFR >90      Calcium 8.7      Albumin 3.9      Alkaline Phosphatase 60      Total Protein 7.4      AST 19      Bilirubin, Total 0.9      ALT 17     MAGNESIUM - Normal    Magnesium 2.25     PROTIME-INR - Normal    Protime 12.2      INR 1.1     SERIAL TROPONIN-INITIAL - Normal    Troponin I, High Sensitivity 4      Narrative:     Less than 99th percentile of normal range cutoff-  Female and children under 18 years old <14 ng/L; Male <21 ng/L: Negative  Repeat testing should be performed if clinically indicated.     Female and children under 18 years old 14-50 ng/L; Male 21-50 ng/L:  Consistent with possible cardiac damage and possible increased clinical   risk. Serial measurements may help to assess extent of myocardial damage.     >50 ng/L: Consistent with cardiac damage, increased clinical risk and  myocardial infarction. Serial measurements may help assess extent of   myocardial damage.      NOTE: Children less than 1 year old may have higher baseline troponin   levels and results should be interpreted in conjunction with the overall   clinical context.     NOTE: Troponin I testing is performed using a different   testing methodology at [facility] than at other   [facility] hospitals. Direct result comparisons should only   be made within the same method.   D-DIMER, VTE EXCLUSION - Normal    D-Dimer, Quantitative VTE Exclusion 412      Narrative:     The VTE Exclusion D-Dimer assay is reported in ng/mL Fibrinogen Equivalent Units (FEU).    Per 's instructions for use, a value of less than 500  ng/mL (FEU) may help to exclude DVT or PE in outpatients when the assay is used with a clinical pretest probability assessment.([facility] must utilize and document eCalc 'Wells Score Deep Vein Thrombosis Risk' for DVT exclusion only. Emergency Department should utilize  Guidelines for Emergency Department Use of the VTE Exclusion D-Dimer and Clinical Pretest probability assessment model for DVT or PE exclusion.)   SERIAL TROPONIN, 1 HOUR - Normal    Troponin I, High Sensitivity 5      Narrative:     Less than 99th percentile of normal range cutoff-  Female and children under 18 years old <14 ng/L; Male <21 ng/L: Negative  Repeat testing should be performed if clinically indicated.     Female and children under 18 years old 14-50 ng/L; Male 21-50 ng/L:  Consistent with possible cardiac damage and possible increased clinical   risk. Serial measurements may help to assess extent of myocardial damage.     >50 ng/L: Consistent with cardiac damage, increased clinical risk and  myocardial infarction. Serial measurements may help assess extent of   myocardial damage.      NOTE: Children less than 1 year old may have higher baseline troponin   levels and results should be interpreted in conjunction with the overall   clinical context.     NOTE: Troponin I testing is performed using a different   testing methodology at [facility] than at other   [facility]. Direct result comparisons should only   be made within the same method.   TROPONIN SERIES- (INITIAL, 1 HR)    Narrative:     The following orders were created for panel order Troponin I Series, High Sensitivity (0, 1 HR).  Procedure                               Abnormality         Status                     ---------                               -----------         ------                     Troponin I, High Sensiti...[[id number]]  Normal              Final result               Troponin, High Sensitivi...[[id number]]  Normal              Final result                  Please view results for these tests on the individual orders.       All other labs were within normal range or not returned as of this dictation.    Imaging  XR chest 1 view   Final Result   No pulmonary edema, pneumothorax, pleural effusion, or focal   consolidation.    Signed by Jc Worthington MD           Procedures  Procedures     EMERGENCY DEPARTMENT COURSE/MDM:     ED Course as of 02/21/25 0400   Fri Feb 21, 2025   0017 Patient was approached numerous times after she arrived via EMS but was found to be on her phone each time and did not acknowledge provider until later in her visit. [TN]   0145 D-dimer negative which the low Wells score patient believe safely secludes pulmonary embolism.  2 negative troponins. [TL]   0145 Do not suspect ACS. [TL]   0145 EKG performed at 23: 39 and independently reviewed by provider: Reveals NSR with a rate of 63 bpm, leftward axis, normal intervals, no ST changes, no T wave abnormalities, no ectopy. No STEMI. [TL]   0146 Chest Radiograph interpretation: No acute cardiopulmonary process.  No pneumothorax, widened mediastinum, pneumonia. [TL]   0150 HEART Score:    History:   [ x ] Slightly suspicious - 0   [  ] Moderately suspicious - 1  [  ] Highly suspicious - 2     EKG:   [ x ] Normal - 0    [  ] Non-specific repolarization disturbance (No ST changes, but LBBB, LVH, repolarization changes)  - 1   [  ] Significant ST deviation (ST changes not d/t LBBB, LVH, digoxin)  - 2     Age:   [  ] < 45 - 0   [ x ] 45-64 - 1   [  ] > 65 - 2     Risk Factors:     HTN, HLD, DM, obesity (BMI > 30), smoking (current or w/I 3mo), + fmx (before age of 65), CAD, prior MI, PCI/CABG, CVA/TIA, PAD  [  ] No known risk factors - 0   [  ] 1-2 risk factors - 1    [ x ] >3 risk factors or hx of atherosclerotic disease - 2     Initial troponin:  [ x ] < normal limit - 0   [  ] 1 - 3x normal limit - 1   [  ] > 3x normal limit - 2    Total:   [x ] 0-3 Points: 1.7% risk of major adverse cardiac  event in 6 weeks  [ ] 4-6 Points: 12-16.6% risk of major adverse cardiac event in 6 weeks  [ ] 7-10 Points 50-65% risk of major adverse cardiac event in 6 weeks    I did discuss the heart score results with the patient.  We did discuss the risk stratification. I did discuss that the patient's risk based on the above scoring and their risk of coronary artery disease. The patient is comfortable being discharged home and following up with the appropriate physicians. This is shared decision making. They're to return to the nearest emergency room for any new or worsening symptoms. [TL]      ED Course User Index  [TL] Arsh Sosa DO  [TN] Jamil Mathews MD         Diagnoses as of 02/21/25 0400   Chest pain due to myocardial ischemia, unspecified ischemic chest pain type        Medical Decision Making  History obtained from the patient.  Records including labs, imaging, notes independently reviewed by me.  Presentation concerning for possible ACS, pulmonary embolism, pneumonia, anxiety, musculoskeletal pain.  Cardiac labs were sent.  Troponin series normal and stable, EKG without evidence of acute injury pattern.  Low suspicion for ACS at this time.  CBC with stable chronic anemia of hemoglobin 11.2.  No leukocytosis.  D-dimer within normal limits.  CT angio of the chest was deferred.  CMP, magnesium unremarkable.  Chest x-ray without acute cardiopulmonary process.  Patient was given Tylenol and lidocaine for her neck pain and headache after the fall.  She is without focal neurologic deficit to suggest indication for CT of the head at this time.  Patient with a low heart score safe for discharge with outpatient follow-up.  Patient discharged home in satisfactory condition.  All questions answered and return precautions discussed.    EKG demonstrates normal sinus rhythm at a rate of 63, normal intervals.  No acute injury pattern.    Patient and or family in agreement and understanding of treatment plan.  All questions  answered.      I reviewed the case with the attending ED physician. The attending ED physician agrees with the plan. Patient and/or patient´s representative was counseled regarding labs, imaging, likely diagnosis, and plan. All questions were answered.    ED Medications administered this visit:    Medications   lidocaine 4 % patch 1 patch (1 patch transdermal Medication Applied 2/21/25 0215)   acetaminophen (Tylenol) tablet 650 mg (650 mg oral Given 2/21/25 0216)       New Prescriptions from this visit:    New Prescriptions    No medications on file       Follow-up:  FELICIA Perez MD  [id number] [facility] [address]  [place] [id number] [phone]    Schedule an appointment as soon as possible for a visit in 3 days          Final Impression:   1. Chest pain due to myocardial ischemia, unspecified ischemic chest pain type          (Please note that portions of this note were completed with a voice recognition program.  Efforts were made to edit the dictations but occasionally words are mis-transcribed.)     Jamil Mathews MD  Resident  02/21/25 0412

## 2025-02-21 NOTE — ED NOTES
patient was up to bathroom with slow gait. She is on the phone and will not get off so we can put her on the monitor. Dr. Mathews attempted to go into room four times and patient will not get in bed or off phone.      Nubia Jeter RN  02/21/25 0018

## 2025-04-07 ENCOUNTER — HOSPITAL ENCOUNTER (EMERGENCY)
Facility: HOSPITAL | Age: 65
Discharge: HOME | End: 2025-04-07
Payer: MEDICARE

## 2025-04-07 ENCOUNTER — APPOINTMENT (OUTPATIENT)
Dept: RADIOLOGY | Facility: HOSPITAL | Age: 65
End: 2025-04-07
Payer: MEDICARE

## 2025-04-07 ENCOUNTER — APPOINTMENT (OUTPATIENT)
Dept: CARDIOLOGY | Facility: HOSPITAL | Age: 65
End: 2025-04-07
Payer: MEDICARE

## 2025-04-07 VITALS
RESPIRATION RATE: 18 BRPM | HEIGHT: 64 IN | BODY MASS INDEX: 31.62 KG/M2 | DIASTOLIC BLOOD PRESSURE: 84 MMHG | HEART RATE: 66 BPM | TEMPERATURE: 98.4 F | SYSTOLIC BLOOD PRESSURE: 177 MMHG | OXYGEN SATURATION: 98 % | WEIGHT: 185.19 LBS
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
## 2025-04-07 DIAGNOSIS — M79.89 LEG SWELLING: ICD-10-CM

## 2025-04-07 DIAGNOSIS — S80.11XA HEMATOMA OF RIGHT LOWER EXTREMITY, INITIAL ENCOUNTER: Primary | ICD-10-CM

## 2025-04-07 DIAGNOSIS — M79.89 OTHER SPECIFIED SOFT TISSUE DISORDERS: ICD-10-CM

## 2025-04-07 PROCEDURE — 93971 EXTREMITY STUDY: CPT

## 2025-04-07 PROCEDURE — 93971 EXTREMITY STUDY: CPT | Performed by: SURGERY

## 2025-04-07 PROCEDURE — 2500000001 HC RX 250 WO HCPCS SELF ADMINISTERED DRUGS (ALT 637 FOR MEDICARE OP): Performed by: PHYSICIAN ASSISTANT

## 2025-04-07 PROCEDURE — 73610 X-RAY EXAM OF ANKLE: CPT | Mod: RT

## 2025-04-07 PROCEDURE — 70450 CT HEAD/BRAIN W/O DYE: CPT

## 2025-04-07 PROCEDURE — 99285 EMERGENCY DEPT VISIT HI MDM: CPT | Mod: 25

## 2025-04-07 PROCEDURE — 73590 X-RAY EXAM OF LOWER LEG: CPT | Mod: RT

## 2025-04-07 RX ORDER — OXYCODONE AND ACETAMINOPHEN 5; 325 MG/1; MG/1
1 TABLET ORAL ONCE
Status: COMPLETED | OUTPATIENT
Start: 2025-04-07 | End: 2025-04-07

## 2025-04-07 RX ORDER — OXYCODONE AND ACETAMINOPHEN 5; 325 MG/1; MG/1
1 TABLET ORAL EVERY 6 HOURS PRN
Qty: 8 TABLET | Refills: 0 | Status: SHIPPED | OUTPATIENT
Start: 2025-04-07 | End: 2025-04-10

## 2025-04-07 RX ORDER — CEPHALEXIN 500 MG/1
500 CAPSULE ORAL ONCE
Status: COMPLETED | OUTPATIENT
Start: 2025-04-07 | End: 2025-04-07

## 2025-04-07 RX ORDER — CEPHALEXIN 500 MG/1
500 CAPSULE ORAL 4 TIMES DAILY
Qty: 28 CAPSULE | Refills: 0 | Status: SHIPPED | OUTPATIENT
Start: 2025-04-07 | End: 2025-04-14

## 2025-04-07 RX ADMIN — CEPHALEXIN 500 MG: 500 CAPSULE ORAL at 16:42

## 2025-04-07 RX ADMIN — OXYCODONE HYDROCHLORIDE AND ACETAMINOPHEN 1 TABLET: 5; 325 TABLET ORAL at 14:51

## 2025-04-07 ASSESSMENT — LIFESTYLE VARIABLES
EVER FELT BAD OR GUILTY ABOUT YOUR DRINKING: NO
EVER HAD A DRINK FIRST THING IN THE MORNING TO STEADY YOUR NERVES TO GET RID OF A HANGOVER: NO
TOTAL SCORE: 0
HAVE YOU EVER FELT YOU SHOULD CUT DOWN ON YOUR DRINKING: NO
HAVE PEOPLE ANNOYED YOU BY CRITICIZING YOUR DRINKING: NO

## 2025-04-07 ASSESSMENT — PAIN SCALES - GENERAL
PAINLEVEL_OUTOF10: 9
PAINLEVEL_OUTOF10: 8
PAINLEVEL_OUTOF10: 6

## 2025-04-07 ASSESSMENT — PAIN DESCRIPTION - LOCATION
LOCATION: LEG

## 2025-04-07 ASSESSMENT — PAIN DESCRIPTION - PAIN TYPE: TYPE: ACUTE PAIN

## 2025-04-07 ASSESSMENT — PAIN - FUNCTIONAL ASSESSMENT: PAIN_FUNCTIONAL_ASSESSMENT: 0-10

## 2025-04-07 ASSESSMENT — PAIN DESCRIPTION - ORIENTATION: ORIENTATION: RIGHT

## 2025-04-07 NOTE — DISCHARGE INSTRUCTIONS
Please return to the ER or seek immediate medical attention if you experience Increasing redness, increasing warmth to touch, milky foul smelling drainage from your wound, fever, or worsening symptoms or increasing pain, redness, warmth or swelling    You are welcome back any time. Thank you for entrusting your care to us, I hope we made your visit as pleasant as possible. Wishing you well!    You are prescribed Percocet, this is a narcotic pain medicine, it does have addictive potential, use with caution. Discontinue use of any symptoms of euphoria.  This medication is also sedating, no driving or operating heavy machinery while taking it.      Nancy Givens PA-C

## 2025-04-07 NOTE — ED PROVIDER NOTES
Emergency Department Provider Note        History of Present Illness     History provided by: Patient  Limitations to History: None  External Records Reviewed with Brief Summary: None    HPI:  Ny Martinez is a 64 y.o. female Patient is a 64-year-old female history of hypertension, legal blindness who presents today for evaluation of right leg swelling.  Patient states he had a mechanical fall where she tripped because she is blind 3 days ago, patient states that she landed on some rocks and believes she hit her inner patient is a 64-year-old female history of hypertension, legal blindness who presents today for evaluation of right leg swelling.  Patient states he had a mechanical fall where she tripped because she is blind 3 days ago, patient states that she landed on some rocks and believes she hit her inner leg with a rock.  Patient states that it bruised up and swelled up but now she is having some increased swelling and wants to be checked out.  She denies any fevers, chills, she states she was wearing pants when it happened so there was no direct contact with the gravel on her skin, she is not sure whether she had a laceration or scrape.  She denies any numbness or tingling down into the leg.  States that it does feel firm.  She denies any previous history of DVTs, denies chest pain shortness of breath, denies hemoptysis, recent surgeries hospitalizations or travel, history of hormone use or malignancy.  Patient states when she fell she did hit her head slightly, no loss of conscious, no anticoagulation use.  She states she always gets headaches so she is not sure whether it is even related.  She did not injure anything else, no neck pain back pain or pain elsewhere.  Patient endorses having taken Tylenol and Motrin without relief.  Will not be driving herself home.        Physical Exam   Triage vitals:  T 36.9 °C (98.4 °F)  HR 58  /82  RR 18  O2 96 % None (Room air)    Physical Exam  Vitals and  nursing note reviewed.   Constitutional:       General: She is not in acute distress.     Appearance: Normal appearance. She is not toxic-appearing.   HENT:      Head: Normocephalic and atraumatic.      Nose: Nose normal.   Eyes:      Extraocular Movements: Extraocular movements intact.   Cardiovascular:      Rate and Rhythm: Normal rate and regular rhythm.   Pulmonary:      Effort: Pulmonary effort is normal.   Abdominal:      Palpations: Abdomen is soft.   Musculoskeletal:         General: Normal range of motion.      Cervical back: Normal range of motion and neck supple.        Legs:       Comments: Right leg is noted to have diffuse swelling with shininess of the skin, minimal warmth is present, 2+ PT DP pulses, distal cap refill and sensation is intact, no numbness or tingling.  Patient has worsening pain with movement of the ankle but is able to do so, full flexion extension at the knee, no joint swelling.  Compartments are compressible.    No midline tenderness to cervical thoracic or lumbar spine, no chest wall, abdominal or hip tenderness, no upper or lower extremity tenderness aside from what is explicitly mentioned.     Skin:     General: Skin is warm and dry.   Neurological:      General: No focal deficit present.      Mental Status: She is alert.   Psychiatric:         Mood and Affect: Mood normal.         Thought Content: Thought content normal.        Lower extremity venous duplex right   Final Result      CT head wo IV contrast   Final Result   Normal unenhanced CT brain. No significant change since prior study.        MACRO:   None             Signed by: Lacy Pearson 4/7/2025 3:42 PM   Dictation workstation:   [id number]      XR tibia fibula right 2 views   Final Result   Edema of the subcutaneous tissues of the right lower leg and ankle.        Small plantar calcaneal spur.        No fracture or dislocation of right tibia or fibula or of the right   ankle.        Signed by: Lacy Pearson 4/7/2025 3:37  PM   Dictation workstation:   [id number]      XR ankle right 3+ views   Final Result   Edema of the subcutaneous tissues of the right lower leg and ankle.        Small plantar calcaneal spur.        No fracture or dislocation of right tibia or fibula or of the right   ankle.        Signed by: Lacy Pearson 2025 3:37 PM   Dictation workstation:   [id number]        Labs Reviewed - No data to display      Medical Decision Making & ED Course   Medical Decision Makin y.o. female presents today for evaluation of right leg swelling after she fell and injured herself 3 days ago, this is a mechanical fall secondary to the patient being legally blind, she hit her in her right leg however also endorses hitting her head, there is no loss of conscious, no blood thinner use But given that she is 64 and does endorse frequent headaches I did order a CT of her head, she has no cervical thoracic or lumbar tenderness, no tenderness of the ribs, abdomen upper or lower extremity otherwise.  Her right leg I suspect is swollen secondary to a hematoma and dependent edema, it happened 3 days ago, no concerns for vascular injury requiring CT imaging, she has normal pulses distally, normal cap refill and sensation, no concerns for compartment syndrome.  She has full range of motion at all joints.  X-ray of the leg and ankle were obtained as well as ultrasound of the right lower extremity.  Ultrasound was negative for DVT, x-rays were negative for traumatic injuries, CT head was also negative.  Suspect that the degree of swelling is secondary to the trauma however given that it does feel slightly warm to touch I will cover her with antibiotics to prevent hematoma infection, Keflex was ordered here and will be sent for home.  Patient feels like Tylenol and ibuprofen is not helping her pain, she does have a large hematoma that appears painful, will discharge her with Percocet, she is aware of the addictive potential of this medication  as well as instructions not to drive or operate heavy machinery.    ----      Differential diagnoses considered include but are not limited to: Cellulitis, DVT, hematoma, dependent edema, vascular injury, etc.     Social Determinants of Health which Significantly Impact Care: None identified     EKG Independent Interpretation: EKG not obtained    Independent Result Review and Interpretation: Relevant laboratory and radiographic results were reviewed and independently interpreted by myself.  As necessary, they are commented on in the ED Course.    Chronic conditions affecting the patient's care: As documented above in [facility]    The patient was discussed with the following consultants/services: None    Care Considerations: As documented above in [facility]    ED Course:  Diagnoses as of 04/07/25 1909   Hematoma of right lower extremity, initial encounter   Leg swelling   Fall, initial encounter     Disposition   As a result of the work-up, the patient was discharged home.  she was informed of her diagnosis and instructed to come back with any concerns or worsening of condition.  she and was agreeable to the plan as discussed above.  she was given the opportunity to ask questions.  All of the patient's questions were answered.    Procedures   Procedures    Patient was seen independently    Nancy Givens PA-C  Emergency Medicine       Nancy Givens PA-C  04/07/25 1909

## 2025-06-02 ENCOUNTER — APPOINTMENT (OUTPATIENT)
Dept: CARDIOLOGY | Facility: HOSPITAL | Age: 65
End: 2025-06-02

## 2025-06-02 ENCOUNTER — HOSPITAL ENCOUNTER (EMERGENCY)
Facility: HOSPITAL | Age: 65
Discharge: HOME | End: 2025-06-03
Attending: EMERGENCY MEDICINE

## 2025-06-02 DIAGNOSIS — L03.115 CELLULITIS OF RIGHT LOWER EXTREMITY: ICD-10-CM

## 2025-06-02 DIAGNOSIS — M79.604 PAIN IN RIGHT LEG: ICD-10-CM

## 2025-06-02 DIAGNOSIS — G43.C0 PERIODIC HEADACHE SYNDROME, NOT INTRACTABLE: Primary | ICD-10-CM

## 2025-06-02 DIAGNOSIS — R29.898 WEAKNESS OF RIGHT HIP: ICD-10-CM

## 2025-06-02 PROCEDURE — 93971 EXTREMITY STUDY: CPT

## 2025-06-02 PROCEDURE — 99285 EMERGENCY DEPT VISIT HI MDM: CPT | Mod: 25 | Performed by: EMERGENCY MEDICINE

## 2025-06-02 PROCEDURE — 93971 EXTREMITY STUDY: CPT | Performed by: SURGERY

## 2025-06-02 RX ORDER — KETOROLAC TROMETHAMINE 15 MG/ML
15 INJECTION, SOLUTION INTRAMUSCULAR; INTRAVENOUS ONCE
Status: COMPLETED | OUTPATIENT
Start: 2025-06-02 | End: 2025-06-03

## 2025-06-02 RX ORDER — LISINOPRIL 10 MG/1
20 TABLET ORAL ONCE
Status: COMPLETED | OUTPATIENT
Start: 2025-06-02 | End: 2025-06-03

## 2025-06-02 RX ORDER — CEPHALEXIN 500 MG/1
500 CAPSULE ORAL ONCE
Status: COMPLETED | OUTPATIENT
Start: 2025-06-02 | End: 2025-06-03

## 2025-06-02 RX ORDER — METOCLOPRAMIDE HYDROCHLORIDE 5 MG/ML
10 INJECTION INTRAMUSCULAR; INTRAVENOUS ONCE
Status: COMPLETED | OUTPATIENT
Start: 2025-06-02 | End: 2025-06-03

## 2025-06-02 ASSESSMENT — LIFESTYLE VARIABLES
EVER HAD A DRINK FIRST THING IN THE MORNING TO STEADY YOUR NERVES TO GET RID OF A HANGOVER: NO
EVER FELT BAD OR GUILTY ABOUT YOUR DRINKING: NO
HAVE PEOPLE ANNOYED YOU BY CRITICIZING YOUR DRINKING: NO
HAVE YOU EVER FELT YOU SHOULD CUT DOWN ON YOUR DRINKING: NO
TOTAL SCORE: 0

## 2025-06-02 ASSESSMENT — PAIN DESCRIPTION - ONSET: ONSET: ONGOING

## 2025-06-02 ASSESSMENT — PAIN DESCRIPTION - FREQUENCY: FREQUENCY: INTERMITTENT

## 2025-06-02 ASSESSMENT — PAIN DESCRIPTION - LOCATION: LOCATION: HEAD

## 2025-06-02 ASSESSMENT — PAIN DESCRIPTION - DESCRIPTORS: DESCRIPTORS: ACHING

## 2025-06-02 ASSESSMENT — PAIN SCALES - GENERAL: PAINLEVEL_OUTOF10: 8

## 2025-06-02 ASSESSMENT — PAIN DESCRIPTION - PAIN TYPE: TYPE: ACUTE PAIN

## 2025-06-02 ASSESSMENT — PAIN DESCRIPTION - PROGRESSION: CLINICAL_PROGRESSION: NOT CHANGED

## 2025-06-02 ASSESSMENT — PAIN - FUNCTIONAL ASSESSMENT: PAIN_FUNCTIONAL_ASSESSMENT: 0-10

## 2025-06-03 ENCOUNTER — APPOINTMENT (OUTPATIENT)
Dept: RADIOLOGY | Facility: HOSPITAL | Age: 65
End: 2025-06-03

## 2025-06-03 VITALS
HEIGHT: 68 IN | BODY MASS INDEX: 30.31 KG/M2 | HEART RATE: 58 BPM | SYSTOLIC BLOOD PRESSURE: 156 MMHG | OXYGEN SATURATION: 99 % | TEMPERATURE: 98.6 F | RESPIRATION RATE: 16 BRPM | DIASTOLIC BLOOD PRESSURE: 84 MMHG | WEIGHT: 200 LBS

## 2025-06-03 LAB
ALBUMIN SERPL BCP-MCNC: 4.2 G/DL (ref 3.4–5)
ALP SERPL-CCNC: 67 U/L (ref 33–136)
ALT SERPL W P-5'-P-CCNC: 11 U/L (ref 7–45)
ANION GAP SERPL CALC-SCNC: 9 MMOL/L (ref 10–20)
AST SERPL W P-5'-P-CCNC: 16 U/L (ref 9–39)
BILIRUB SERPL-MCNC: 1.2 MG/DL (ref 0–1.2)
BNP SERPL-MCNC: 49 PG/ML (ref 0–99)
BUN SERPL-MCNC: 10 MG/DL (ref 6–23)
CALCIUM SERPL-MCNC: 9 MG/DL (ref 8.6–10.3)
CHLORIDE SERPL-SCNC: 101 MMOL/L (ref 98–107)
CO2 SERPL-SCNC: 30 MMOL/L (ref 21–32)
CREAT SERPL-MCNC: 0.51 MG/DL (ref 0.5–1.05)
EGFRCR SERPLBLD CKD-EPI 2021: >90 ML/MIN/1.73M*2
GLUCOSE SERPL-MCNC: 85 MG/DL (ref 74–99)
POTASSIUM SERPL-SCNC: 3.6 MMOL/L (ref 3.5–5.3)
PROT SERPL-MCNC: 7.9 G/DL (ref 6.4–8.2)
SODIUM SERPL-SCNC: 136 MMOL/L (ref 136–145)

## 2025-06-03 PROCEDURE — 73502 X-RAY EXAM HIP UNI 2-3 VIEWS: CPT | Mod: RT

## 2025-06-03 PROCEDURE — 80053 COMPREHEN METABOLIC PANEL: CPT | Performed by: EMERGENCY MEDICINE

## 2025-06-03 PROCEDURE — 83880 ASSAY OF NATRIURETIC PEPTIDE: CPT | Performed by: EMERGENCY MEDICINE

## 2025-06-03 PROCEDURE — 96374 THER/PROPH/DIAG INJ IV PUSH: CPT

## 2025-06-03 PROCEDURE — 73502 X-RAY EXAM HIP UNI 2-3 VIEWS: CPT | Mod: RIGHT SIDE | Performed by: RADIOLOGY

## 2025-06-03 PROCEDURE — 96372 THER/PROPH/DIAG INJ SC/IM: CPT | Performed by: EMERGENCY MEDICINE

## 2025-06-03 PROCEDURE — 36415 COLL VENOUS BLD VENIPUNCTURE: CPT | Performed by: EMERGENCY MEDICINE

## 2025-06-03 PROCEDURE — 2500000001 HC RX 250 WO HCPCS SELF ADMINISTERED DRUGS (ALT 637 FOR MEDICARE OP): Performed by: EMERGENCY MEDICINE

## 2025-06-03 PROCEDURE — 96375 TX/PRO/DX INJ NEW DRUG ADDON: CPT

## 2025-06-03 PROCEDURE — 96361 HYDRATE IV INFUSION ADD-ON: CPT

## 2025-06-03 PROCEDURE — 2500000004 HC RX 250 GENERAL PHARMACY W/ HCPCS (ALT 636 FOR OP/ED): Performed by: EMERGENCY MEDICINE

## 2025-06-03 RX ORDER — CEPHALEXIN 500 MG/1
500 CAPSULE ORAL 2 TIMES DAILY
Qty: 14 CAPSULE | Refills: 0 | Status: SHIPPED | OUTPATIENT
Start: 2025-06-03 | End: 2025-06-10

## 2025-06-03 RX ORDER — DIPHENHYDRAMINE HYDROCHLORIDE 50 MG/ML
12.5 INJECTION, SOLUTION INTRAMUSCULAR; INTRAVENOUS ONCE
Status: COMPLETED | OUTPATIENT
Start: 2025-06-03 | End: 2025-06-03

## 2025-06-03 RX ADMIN — METOCLOPRAMIDE 10 MG: 5 INJECTION, SOLUTION INTRAMUSCULAR; INTRAVENOUS at 00:23

## 2025-06-03 RX ADMIN — SODIUM CHLORIDE, POTASSIUM CHLORIDE, SODIUM LACTATE AND CALCIUM CHLORIDE 1000 ML: 600; 310; 30; 20 INJECTION, SOLUTION INTRAVENOUS at 00:18

## 2025-06-03 RX ADMIN — KETOROLAC TROMETHAMINE 15 MG: 15 INJECTION, SOLUTION INTRAMUSCULAR; INTRAVENOUS at 00:21

## 2025-06-03 RX ADMIN — LISINOPRIL 20 MG: 10 TABLET ORAL at 00:24

## 2025-06-03 RX ADMIN — DIPHENHYDRAMINE HYDROCHLORIDE 12.5 MG: 50 INJECTION, SOLUTION INTRAMUSCULAR; INTRAVENOUS at 01:18

## 2025-06-03 RX ADMIN — CEPHALEXIN 500 MG: 500 CAPSULE ORAL at 00:25

## 2025-06-03 ASSESSMENT — PAIN - FUNCTIONAL ASSESSMENT: PAIN_FUNCTIONAL_ASSESSMENT: 0-10

## 2025-06-03 ASSESSMENT — PAIN SCALES - GENERAL
PAINLEVEL_OUTOF10: 6
PAINLEVEL_OUTOF10: 3

## 2025-06-03 NOTE — DISCHARGE INSTRUCTIONS
Return to the emergency department for continued headache, worsening leg pain, high fever, or any other concerning symptoms.

## 2025-06-03 NOTE — ED PROVIDER NOTES
Emergency Department Provider Note       History of Present Illness     History provided by: Patient  Limitations to History: None    HPI:  Ny Martinez is a 65 y.o. female of HTN, glaucoma presenting with leg swelling and headache.    She was seen about 1 month ago for an injury to her RLE. She was noted to have a large hematoma and treated for cellulitis. She took her medication as prescribed and did notice improvement. She continues to have a lump at the site of the hematoma that is  which is making her concerned. She also has hit her leg twice in the past two weeks leading to wounds. She is concerned that they have not healed yet. She feels like her leg is tight and swollen and difficult to lift. She has also been having pain on the top of her right foot but denies any injury.    She has also been having a headache for the past 3 days. It is right sided, throbbing, associated with dizziness and nausea. No photophobia or phonophobia. Took Tylenol yesterday which helped but did not abort the headache. She has not had any pain medication today or taken her blood pressure medication.    External Records Reviewed with Brief Summary:   4/7 ED visit for leg swelling and hematoma, treated with Keflex  2/11 ED visit for leg swelling, difficulty moving due to discomfort, BNP, LFTs, Cr and UA normal at that time    Physical Exam   Triage vitals:  T 37.5 °C (99.5 °F)  HR 54  /80 (did not take HTN meds today)  RR 16  O2 98 % None (Room air)    Vital signs and nursing notes reviewed.  General: Alert, cooperative, well appearing, in no acute distress  HEENT: Normocephalic, atraumatic, normal nares without rhinorrhea, normal auricles without otorrhea, EOMI, no conjunctival injection, no scleral icterus, no oropharyngeal erythema or exudate, moist mucous membranes  Cardiovascular: Regular rate and rhythm, dorsalis pedis pulses 2+ bilaterally  Pulmonary: Non-labored breathing  Abdomen:  Non-distended  Musculoskeletal: Normal muscle tone and bulk, bilateral lower extremity edema, tenderness to R proximal 3rd-5th metatarsals and inferior aspect of the lateral malleolus   Neurologic: Alert, oriented to person, place, time and situation, memory intact. Speech is fluid with no slur or dysarthria.   CN: PERRL. EOM intact, no gaze preference or deviation, no nystagmus. Normal sensation in V1, V2, and V3 segments bilaterally. No facial asymmetry, no nasolabial fold flattening. Normal hearing to speech. Normal palatal elevation, no uvular deviation. 5/5 head turn and 5/5 shoulder shrug (normal CNXI function). Midline tongue protrusion (normal CNXII function).   Strength: 5/5 strength in bilateral shoulder abductors/adductors, elbow flexors/extensors, wrist flexors/extensors, finger abductors/adductors. 4/5 strength in R hip flexor, otherwise 5/5 L hip flexor, bilateral knee flexors/extensor, bilateral ankle dorsiflexors and planter flexors.   Sensory: Normal to touch. No hemineglect  Coordination: Normal finger to nose, no tremor, no dysmetria   Skin: Clean, dry, resolving hematoma to the medial proximal tibia, two wounds approximately 1 cm x 0.5 cm in area on the posterior R calf with scabbing and surrounding erythema and tenderness to palpation      Medical Decision Making & ED Course   Medical Decision Makin y.o. female presenting with leg pain and swelling and headache.    DDX for leg pain and swelling includes but is not limited to DVT, cellulitis, lymphedema. Will assess for CHF, renal dysfunction, and liver dysfunction for cause of leg swelling, recent work up in 2025 was normal. Her hematoma appears to be healing appropriately. Given the size of her wounds, I do not think there is delayed healing. Given recent infection treated with abx 1 month ago and erythema and tenderness on exam today, will cover for possible wound infection with Keflex.    Patient reports heaviness of her R leg. On  exam she has sensory changes but does have weakness isolated only to her hip. No back pain. Did have some pain in the R hip during DVT ultrasound. Discussed with patient that this likely represents weakness of her gluteus muscle. She does not have any back pain so I am less concerned with a spinal cause of her weakness. Focal weakness to the hip flexors is also not consistent with a central cause. Given pain with US assessment, discussed XR of hip to assess for OA, but ultimately will likely need PT.    For foot pain, pt denies injury. Discussed XR for assessment of occult fracture, patient would like to defer at this time as she has been able to walk on it. Likely ligamentous injury, no erythema or ecchymosis near the area to other suggest infection or occult fracture.     Aside from R hip flexion weakness, no other focal neurologic deficits, no need for CT scan at this time for headache symptoms as symptoms have been ongoing for 3 days. Will treat with migraine cocktail and reassess. Will also given home BP medication that missed.     ED Course:  ED Course as of 06/03/25 0219   Mon Jun 02, 2025   2347 BP: 158/80  Arrives slightly hypertensive with otherwise normal vital signs.  Exam is notable for bilateral swollen extremities.  Healing hematoma is noted to the right lower extremity.  She does have 2 large but superficial wounds with scabs and surrounding erythema, tender to touch. Pain along the proximal 3rd-5th metatarsals of the R foot and along the inferior aspect of the lateral malleolus. 4/5 weakness in R hip flexion, otherwise normal neurologic exam. Will treated with Toradol, Reglan, and IVF for headache. Given hope BP medication (lisinopril). Keflex given for erythema around wounds. [MB]   [place] Jun 03, 2025   0041 Lower extremity venous duplex right  Received a call from vascular, no DVT in RLE  [MB]   0103 Patient reporting some akathisia, diphenhydramine ordered. [MB]   0123 B-Type Natriuretic  Peptide  Normal [MB]   0123 Creatinine: 0.51  No renal dysfunction [MB]   0123 AST: 16  LFTs wnl [MB]   0143 XR hip right with pelvis when performed 2 or 3 views  Mild OA of both hips [MB]   0218 Reassessed patient, headache is feeling better. Discussed results of work up, she expressed understanding of her results. Will send prescription for Keflex. Will refer to primary care and physical therapy. Discussed return to ED precautions. She was in agreement with this plan of care and agreeable to discharge at this time. [MB]      ED Course User Index  [MB] Vandana Guajardo MD         Diagnoses as of 06/03/25 0219   Periodic headache syndrome, not intractable   Weakness of right hip   Cellulitis of right lower extremity       Disposition   As a result of the work-up, the patient was discharged home.  she was informed of her diagnosis and instructed to come back with any concerns or worsening of condition.  she and was agreeable to the plan as discussed above.  she was given the opportunity to ask questions.  All of the patient's questions were answered.    Vandana Guajardo MD  Emergency Medicine       Vandana Guajardo MD  06/03/25 0221

## 2025-07-21 ENCOUNTER — APPOINTMENT (OUTPATIENT)
Dept: RADIOLOGY | Facility: HOSPITAL | Age: 65
End: 2025-07-21
Payer: MEDICARE

## 2025-07-21 ENCOUNTER — PHARMACY VISIT (OUTPATIENT)
Dept: PHARMACY | Facility: CLINIC | Age: 65
End: 2025-07-21
Payer: COMMERCIAL

## 2025-07-21 ENCOUNTER — HOME HEALTH ADMISSION (OUTPATIENT)
Dept: HOME HEALTH SERVICES | Facility: HOME HEALTH | Age: 65
End: 2025-07-21
Payer: MEDICARE

## 2025-07-21 ENCOUNTER — HOSPITAL ENCOUNTER (OUTPATIENT)
Facility: HOSPITAL | Age: 65
Setting detail: OBSERVATION
Discharge: HOME | End: 2025-07-21
Attending: EMERGENCY MEDICINE | Admitting: NURSE PRACTITIONER
Payer: MEDICARE

## 2025-07-21 ENCOUNTER — DOCUMENTATION (OUTPATIENT)
Dept: HOME HEALTH SERVICES | Facility: HOME HEALTH | Age: 65
End: 2025-07-21

## 2025-07-21 VITALS
HEART RATE: 70 BPM | RESPIRATION RATE: 16 BRPM | TEMPERATURE: 97.4 F | OXYGEN SATURATION: 100 % | WEIGHT: 186 LBS | HEIGHT: 64 IN | BODY MASS INDEX: 31.76 KG/M2 | SYSTOLIC BLOOD PRESSURE: 165 MMHG | DIASTOLIC BLOOD PRESSURE: 107 MMHG

## 2025-07-21 DIAGNOSIS — R26.2 DIFFICULTY WALKING: ICD-10-CM

## 2025-07-21 DIAGNOSIS — M54.50 CHRONIC LOW BACK PAIN WITHOUT SCIATICA, UNSPECIFIED BACK PAIN LATERALITY: ICD-10-CM

## 2025-07-21 DIAGNOSIS — L03.116 CELLULITIS OF LEFT LOWER EXTREMITY: ICD-10-CM

## 2025-07-21 DIAGNOSIS — R26.81 GAIT INSTABILITY: ICD-10-CM

## 2025-07-21 DIAGNOSIS — G89.29 CHRONIC LOW BACK PAIN WITHOUT SCIATICA, UNSPECIFIED BACK PAIN LATERALITY: ICD-10-CM

## 2025-07-21 DIAGNOSIS — I10 HYPERTENSION, UNSPECIFIED TYPE: Primary | ICD-10-CM

## 2025-07-21 DIAGNOSIS — S39.012S STRAIN OF LUMBAR REGION, SEQUELA: ICD-10-CM
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
## 2025-07-21 PROBLEM — D50.8 IRON DEFICIENCY ANEMIA SECONDARY TO INADEQUATE DIETARY IRON INTAKE: Status: ACTIVE | Noted: 2025-07-21

## 2025-07-21 PROBLEM — M16.11 PRIMARY OSTEOARTHRITIS OF RIGHT HIP: Status: ACTIVE | Noted: 2025-07-21

## 2025-07-21 PROBLEM — M17.9 OSTEOARTHRITIS OF KNEE: Status: ACTIVE | Noted: 2025-07-21

## 2025-07-21 PROBLEM — L03.90 CELLULITIS: Status: ACTIVE | Noted: 2025-07-21

## 2025-07-21 LAB
ALBUMIN SERPL BCP-MCNC: 3.6 G/DL (ref 3.4–5)
ALP SERPL-CCNC: 61 U/L (ref 33–136)
ALT SERPL W P-5'-P-CCNC: 7 U/L (ref 7–45)
ANION GAP SERPL CALC-SCNC: 7 MMOL/L (ref 10–20)
APPEARANCE UR: CLEAR
APTT PPP: 28 SECONDS (ref 26–36)
AST SERPL W P-5'-P-CCNC: 12 U/L (ref 9–39)
BASOPHILS # BLD AUTO: 0.02 X10*3/UL (ref 0–0.1)
BASOPHILS NFR BLD AUTO: 0.4 %
BILIRUB SERPL-MCNC: 1.4 MG/DL (ref 0–1.2)
BILIRUB UR STRIP.AUTO-MCNC: NEGATIVE MG/DL
BUN SERPL-MCNC: 12 MG/DL (ref 6–23)
CALCIUM SERPL-MCNC: 8.6 MG/DL (ref 8.6–10.6)
CHLORIDE SERPL-SCNC: 108 MMOL/L (ref 98–107)
CO2 SERPL-SCNC: 32 MMOL/L (ref 21–32)
COLOR UR: COLORLESS
CREAT SERPL-MCNC: 0.63 MG/DL (ref 0.5–1.05)
EGFRCR SERPLBLD CKD-EPI 2021: >90 ML/MIN/1.73M*2
EOSINOPHIL # BLD AUTO: 0.17 X10*3/UL (ref 0–0.7)
EOSINOPHIL NFR BLD AUTO: 3.5 %
ERYTHROCYTE [DISTWIDTH] IN BLOOD BY AUTOMATED COUNT: 11.4 % (ref 11.5–14.5)
GLUCOSE SERPL-MCNC: 85 MG/DL (ref 74–99)
GLUCOSE UR STRIP.AUTO-MCNC: NORMAL MG/DL
HCT VFR BLD AUTO: 27.7 % (ref 36–46)
HGB BLD-MCNC: 9.2 G/DL (ref 12–16)
HOLD SPECIMEN: NORMAL
IMM GRANULOCYTES # BLD AUTO: 0.01 X10*3/UL (ref 0–0.7)
IMM GRANULOCYTES NFR BLD AUTO: 0.2 % (ref 0–0.9)
INR PPP: 1 (ref 0.9–1.1)
KETONES UR STRIP.AUTO-MCNC: NEGATIVE MG/DL
LEUKOCYTE ESTERASE UR QL STRIP.AUTO: NEGATIVE
LYMPHOCYTES # BLD AUTO: 2.03 X10*3/UL (ref 1.2–4.8)
LYMPHOCYTES NFR BLD AUTO: 41.6 %
MCH RBC QN AUTO: 30.5 PG (ref 26–34)
MCHC RBC AUTO-ENTMCNC: 33.2 G/DL (ref 32–36)
MCV RBC AUTO: 92 FL (ref 80–100)
MONOCYTES # BLD AUTO: 0.43 X10*3/UL (ref 0.1–1)
MONOCYTES NFR BLD AUTO: 8.8 %
NEUTROPHILS # BLD AUTO: 2.22 X10*3/UL (ref 1.2–7.7)
NEUTROPHILS NFR BLD AUTO: 45.5 %
NITRITE UR QL STRIP.AUTO: NEGATIVE
NRBC BLD-RTO: 0 /100 WBCS (ref 0–0)
PH UR STRIP.AUTO: 6.5 [PH]
PLATELET # BLD AUTO: 200 X10*3/UL (ref 150–450)
POTASSIUM SERPL-SCNC: 3.7 MMOL/L (ref 3.5–5.3)
PROT SERPL-MCNC: 6.7 G/DL (ref 6.4–8.2)
PROT UR STRIP.AUTO-MCNC: NEGATIVE MG/DL
PROTHROMBIN TIME: 11.1 SECONDS (ref 9.8–12.4)
RBC # BLD AUTO: 3.02 X10*6/UL (ref 4–5.2)
RBC # UR STRIP.AUTO: NEGATIVE MG/DL
SODIUM SERPL-SCNC: 143 MMOL/L (ref 136–145)
SP GR UR STRIP.AUTO: 1.01
UROBILINOGEN UR STRIP.AUTO-MCNC: NORMAL MG/DL
WBC # BLD AUTO: 4.9 X10*3/UL (ref 4.4–11.3)

## 2025-07-21 PROCEDURE — 36415 COLL VENOUS BLD VENIPUNCTURE: CPT

## 2025-07-21 PROCEDURE — 93970 EXTREMITY STUDY: CPT | Performed by: RADIOLOGY

## 2025-07-21 PROCEDURE — G0378 HOSPITAL OBSERVATION PER HR: HCPCS

## 2025-07-21 PROCEDURE — 85025 COMPLETE CBC W/AUTO DIFF WBC: CPT

## 2025-07-21 PROCEDURE — 99285 EMERGENCY DEPT VISIT HI MDM: CPT | Mod: 25 | Performed by: EMERGENCY MEDICINE

## 2025-07-21 PROCEDURE — 70450 CT HEAD/BRAIN W/O DYE: CPT | Performed by: RADIOLOGY

## 2025-07-21 PROCEDURE — 70450 CT HEAD/BRAIN W/O DYE: CPT

## 2025-07-21 PROCEDURE — 73502 X-RAY EXAM HIP UNI 2-3 VIEWS: CPT | Mod: RIGHT SIDE | Performed by: RADIOLOGY

## 2025-07-21 PROCEDURE — RXMED WILLOW AMBULATORY MEDICATION CHARGE

## 2025-07-21 PROCEDURE — 2500000001 HC RX 250 WO HCPCS SELF ADMINISTERED DRUGS (ALT 637 FOR MEDICARE OP): Performed by: NURSE PRACTITIONER

## 2025-07-21 PROCEDURE — 2500000001 HC RX 250 WO HCPCS SELF ADMINISTERED DRUGS (ALT 637 FOR MEDICARE OP)

## 2025-07-21 PROCEDURE — 73502 X-RAY EXAM HIP UNI 2-3 VIEWS: CPT | Mod: RT

## 2025-07-21 PROCEDURE — 81003 URINALYSIS AUTO W/O SCOPE: CPT

## 2025-07-21 PROCEDURE — 2500000001 HC RX 250 WO HCPCS SELF ADMINISTERED DRUGS (ALT 637 FOR MEDICARE OP): Performed by: EMERGENCY MEDICINE

## 2025-07-21 PROCEDURE — 84075 ASSAY ALKALINE PHOSPHATASE: CPT

## 2025-07-21 PROCEDURE — 96374 THER/PROPH/DIAG INJ IV PUSH: CPT | Mod: 59

## 2025-07-21 PROCEDURE — 99235 HOSP IP/OBS SAME DATE MOD 70: CPT | Performed by: NURSE PRACTITIONER

## 2025-07-21 PROCEDURE — 2500000004 HC RX 250 GENERAL PHARMACY W/ HCPCS (ALT 636 FOR OP/ED): Mod: TB

## 2025-07-21 PROCEDURE — 96375 TX/PRO/DX INJ NEW DRUG ADDON: CPT

## 2025-07-21 PROCEDURE — 93970 EXTREMITY STUDY: CPT

## 2025-07-21 PROCEDURE — 97161 PT EVAL LOW COMPLEX 20 MIN: CPT | Mod: GP

## 2025-07-21 PROCEDURE — 85610 PROTHROMBIN TIME: CPT

## 2025-07-21 PROCEDURE — 99223 1ST HOSP IP/OBS HIGH 75: CPT | Performed by: NURSE PRACTITIONER

## 2025-07-21 RX ORDER — DOXYCYCLINE HYCLATE 100 MG
100 TABLET ORAL EVERY 12 HOURS SCHEDULED
Status: DISCONTINUED | OUTPATIENT
Start: 2025-07-21 | End: 2025-07-21 | Stop reason: HOSPADM

## 2025-07-21 RX ORDER — CLINDAMYCIN HYDROCHLORIDE 300 MG/1
600 CAPSULE ORAL ONCE
Status: COMPLETED | OUTPATIENT
Start: 2025-07-21 | End: 2025-07-21

## 2025-07-21 RX ORDER — LISINOPRIL AND HYDROCHLOROTHIAZIDE 20; 25 MG/1; MG/1
1 TABLET ORAL DAILY
COMMUNITY
Start: 2013-03-27 | End: 2025-07-21 | Stop reason: ALTCHOICE

## 2025-07-21 RX ORDER — METOCLOPRAMIDE HYDROCHLORIDE 5 MG/ML
10 INJECTION INTRAMUSCULAR; INTRAVENOUS ONCE
Status: COMPLETED | OUTPATIENT
Start: 2025-07-21 | End: 2025-07-21

## 2025-07-21 RX ORDER — AMOXICILLIN 250 MG
2 CAPSULE ORAL 2 TIMES DAILY
Status: DISCONTINUED | OUTPATIENT
Start: 2025-07-21 | End: 2025-07-21 | Stop reason: HOSPADM

## 2025-07-21 RX ORDER — ACETAMINOPHEN 325 MG/1
975 TABLET ORAL ONCE
Status: COMPLETED | OUTPATIENT
Start: 2025-07-21 | End: 2025-07-21

## 2025-07-21 RX ORDER — DOXYCYCLINE 100 MG/1
100 CAPSULE ORAL EVERY 12 HOURS SCHEDULED
Qty: 10 CAPSULE | Refills: 0 | Status: SHIPPED | OUTPATIENT
Start: 2025-07-21 | End: 2025-07-26

## 2025-07-21 RX ADMIN — DOXYCYCLINE HYCLATE 100 MG: 100 TABLET, COATED ORAL at 16:55

## 2025-07-21 RX ADMIN — CLINDAMYCIN HYDROCHLORIDE 600 MG: 300 CAPSULE ORAL at 06:34

## 2025-07-21 RX ADMIN — ACETAMINOPHEN 975 MG: 325 TABLET ORAL at 11:06

## 2025-07-21 RX ADMIN — METOCLOPRAMIDE 10 MG: 5 INJECTION, SOLUTION INTRAMUSCULAR; INTRAVENOUS at 06:34

## 2025-07-21 RX ADMIN — HYDROMORPHONE HYDROCHLORIDE 0.4 MG: 1 INJECTION, SOLUTION INTRAMUSCULAR; INTRAVENOUS; SUBCUTANEOUS at 04:58

## 2025-07-21 ASSESSMENT — PAIN - FUNCTIONAL ASSESSMENT
PAIN_FUNCTIONAL_ASSESSMENT: 0-10
PAIN_FUNCTIONAL_ASSESSMENT: 0-10

## 2025-07-21 ASSESSMENT — COGNITIVE AND FUNCTIONAL STATUS - GENERAL
CLIMB 3 TO 5 STEPS WITH RAILING: A LITTLE
TURNING FROM BACK TO SIDE WHILE IN FLAT BAD: A LITTLE
WALKING IN HOSPITAL ROOM: A LITTLE
STANDING UP FROM CHAIR USING ARMS: A LITTLE
MOVING TO AND FROM BED TO CHAIR: A LITTLE
MOBILITY SCORE: 18
MOVING FROM LYING ON BACK TO SITTING ON SIDE OF FLAT BED WITH BEDRAILS: A LITTLE

## 2025-07-21 ASSESSMENT — ACTIVITIES OF DAILY LIVING (ADL): ADL_ASSISTANCE: INDEPENDENT

## 2025-07-21 ASSESSMENT — ENCOUNTER SYMPTOMS
GASTROINTESTINAL NEGATIVE: 1
CONSTITUTIONAL NEGATIVE: 1
CARDIOVASCULAR NEGATIVE: 1
NEUROLOGICAL NEGATIVE: 1
WHEEZING: 0
EYES NEGATIVE: 1
RESPIRATORY NEGATIVE: 1
MUSCULOSKELETAL NEGATIVE: 1

## 2025-07-21 ASSESSMENT — PAIN SCALES - GENERAL
PAINLEVEL_OUTOF10: 7
PAINLEVEL_OUTOF10: 4

## 2025-07-21 NOTE — PROGRESS NOTES
Emergency Department Transition of Care Note       Signout   I received Ny Martinez in signout from Dr. Hinkle.  Please see the ED Provider Note for all HPI, PE and MDM up to the time of signout at 0700.  This is in addition to the primary record.    In brief Ny Martinez is an 65 y.o. female presenting for right lower back pain and left lower extremity wound.  Imaging showed no evidence of fracture. Right lower back pain DVT ultrasound showed no evidence of DVT in bilateral lower extremities.  Patient given pain control and dose of clindamycin here in the ED.    At the time of signout we were awaiting:  Reevaluation and consideration of admission for PT/OT.    ED Course & Medical Decision Making   Medical Decision Making:  Under my care, patient was reevaluated.  Images of her wounds were placed in chart.  See below.  Right hip/buttocks ecchymosis      Left lower extremity edema and erythema      After discussion with patient, she was amenable to possible admission for PT/OT but did not wish for SNF placement.  Considered CDU but given possible need for higher intensity PT/OT then is able in the CDU will have PT evaluate patient here in the ED.  Social work also consulted for possible initiation of Healthy at Home. Social work had patient start initial step of setting up [facility] at Home. They noted someone may not be able to make it to her place to help with PT until 24-48 hours. At this time patient does not feel comfortable going home. Even with PT eval in ED, they recommended use of walker, although patient has stairs at her house and is unsure how this would work. She was able to stand and took a few steps in the ED, but she appeared unsteady on her feet doing this.     Patient to be admitted until safe dispo and PT can be set up for patient outpatient.     ED Course:  ED Course as of 07/22/25 1929 Mon Jul 21, 2025   2565 Urinalysis unremarkable [JW]   0553 CBC and Auto Differential(!)  CBC shows  mild anemia. Otherwise unremarkable. No signs of leukocytosis [JW]   0616 CMP unremarkable [JW]   0644 CT scan shows no acute abnormality.  [JW]   0723 X-ray hip shows no evidence of fracture or osteomyelitis.  [JW]      ED Course User Index  [JW] Marilee Hinkle MD         Diagnoses as of 07/22/25 1929   Cellulitis of left lower extremity   Difficulty walking       Disposition   As a result of their workup, the patient will require admission to the hospital.  The patient was informed of her diagnosis.  The patient was given the opportunity to ask questions and I answered them. The patient agreed to be admitted to the hospital.    Procedures   Procedures    Patient seen and discussed with ED attending physician.    Kyrie Grimes,   Emergency Medicine  PGY-2

## 2025-07-21 NOTE — ED PROVIDER NOTES
Emergency Department Provider Note       History of Present Illness     History provided by: Patient  Limitations to History: None  External Records Reviewed with Brief Summary: None    HPI:  Ny Martinez is a 65 y.o. female with a past medical history of hypertension, glaucoma, and legal blindness that presents for intermittent right lower back pain. The back pain radiates to her right hip. These symptoms started 6 days ago. Alleviating factors include placing pressure on her lower back. Aggravating factors include inspiration. The pain is 10/10 and described as sharp. The patient denies history of injury to the affected region. The patient also has a generalized headache that started this morning. The headache does not radiate. It is described as 8/10 and throbbing. The patient did not take medications for the headache. She denies a history of migraines. She reports that her headache feels different from previous headaches because of a unusual sensation to the right frontotemporal region. The patient is also concerned about a left leg wound. She said she noticed it 4 days ago.    Physical Exam   Triage vitals:  T 36.5 °C (97.7 °F)  HR 68  /90  RR 16  O2 100 % None (Room air)    General: Awake, alert, in no acute distress  Eyes: Patient legally blind.   HENT: Normo-cephalic, atraumatic. Tenderness to right frontoparietal region of head. No external signs of trauma.   CV: Regular rate, regular rhythm. 2+ dorsalis pedis pulses bilaterally.   Resp: Breathing non-labored, speaking in full sentences.  Clear to auscultation bilaterally  GI: Soft, non-distended, non-tender. No rebound or guarding.right CVA tenderness. Negative left CVA tenderness.   MSK/Extremities: No gross bony deformities. Moving all extremities. Patient has difficulty ambulating within the department. 1+ pitting edema of bilateral lower extremities.   Skin: Significant area of ecchymosis to right buttock. Scabbed over lesion to left  calf. Erythema, warmth to left lower extremity at level of the posterior calf.   Neuro: Speech is fluent. Light sensation intact in bilateral lower extremities. 5/5 strength in bilateral lower extremities.     Medical Decision Making & ED Course   Medical Decision Makin y.o. female that presents to the emergency department for right lower back pain, headache, left leg wound. The patient has a remote history of cellulitis of the right lower extremity. An x-ray of the right hip was ordered to check for osteomyelitis or right hip fracture. Bilateral lower extremity duplex scans also ordered to check for evidence of DVT due to lower extremity pain and edema. Wells Score 1 (moderate risk). CT head without contrast ordered since patient's headache is different from prior episodes.   ----      Differential diagnoses considered include but are not limited to: UTI, stage 1 decubitus ulcer, intracranial hemorrhage, cellulitis, osteomyelitis.    Urinary tract infection considered. However, urinalysis unremarkable. No evidence of fever in the emergency department. Patient denies chills. Stage 1 decubitus ulcer considered as well. However, patient denies extended periods of laying or sitting and reports that she does a lot of walking. Intracranial hemorrhage considered with new symptoms of headache. CT head without ordered. Low on differential since the patient has no history of injury or trauma to the area.     Social Determinants of Health which Significantly Impact Care: Social Determinants of Health which Significantly Impact Care: None identified     EKG Independent Interpretation: EKG not obtained    Independent Result Review and Interpretation: Relevant laboratory and radiographic results were reviewed and independently interpreted by myself.  As necessary, they are commented on in the ED Course.    Chronic conditions affecting the patient's care: As documented above in MDM    The patient was discussed with the  following consultants/services: None    Care Considerations: None    ED Course:  ED Course as of 07/21/25 0724   Mon Jul 21, 2025   0444 Urinalysis unremarkable [JW]   0553 CBC and Auto Differential(!)  CBC shows mild anemia. Otherwise unremarkable. No signs of leukocytosis [JW]   0616 CMP unremarkable [JW]   0644 CT scan shows no acute abnormality.  [JW]   0723 X-ray hip shows no evidence of fracture or osteomyelitis.  [JW]      ED Course User Index  [JW] Marilee Hinkle MD       Disposition   Patient was signed out to Dr. Kyrie Grimes at 0700 pending completion of their work-up.  Please see the next provider's transition of care note for the remainder of the patient's care.     Procedures   Procedures        Marilee Hinkle MD  Emergency Medicine       Marilee Hinkle MD  Resident  07/21/25 0722       Marilee Hinkle MD  Resident  07/21/25 0724    --------------------------------------------------------    This patient was seen by the resident physician. I have seen and examined the patient, agree with the workup, evaluation, management and diagnosis. The care plan has been discussed and I concur.    My assessment reveals the following:    HPI:  Patient is a 64 y/o female with h/o HTN, glaucoma, legal blindness presenting with right lower back/right buttock pain. Pain spreads to right hip. Started 6 days ago. No trauma or injury recalled by patient. Pain improves by putting pressure on lower back. No incontinence. Inspiration makes pain worse. No urinary complaints. Also reports generalized headache described as throbbing, 8/10 severity, without radiation. Did not take any medications at home for pain. Does have h/o headache, but no formal dx of migraines. Also report LLE pain with a wound on calf that had some sloughing off of skin followed by drainage and now hard. No N/V/abd pain.     PE:  Vital signs reviewed in nursing triage note, EMR flowsheets, and at patient's bedside  GEN: Patient is  awake, alert, calm, cooperative, and in mild to moderate painful distress.  HEAD: Normocephalic and atraumatic.  EYES: Anicteric sclera.  MOUTH: Mucous membranes moist.  CV: Regular rate and rhythm. (+) s1/s2. No murmurs/rubs/gallops.  PULM: CTAB. No wheezes, rales, or crackles.  GI: Soft, non-tender, non-distended without rebound or guarding.  EXT: No deformities noted. Full range of motion at all major joints. Non-tender.  NEURO: Moves all extremities. No gross focal deficits. Sensation grossly intact throughout.  SKIN: Warm, dry. Mild erythema over left tib/fib with scab over healing wound on left calf. Large ecchymosis over buttocks, more so on right side.     Labs Reviewed   URINALYSIS WITH REFLEX CULTURE AND MICROSCOPIC - Abnormal       Result Value    Color, Urine Colorless (*)     Appearance, Urine Clear      Specific Gravity, Urine 1.006      pH, Urine 6.5      Protein, Urine NEGATIVE      Glucose, Urine Normal      Blood, Urine NEGATIVE      Ketones, Urine NEGATIVE      Bilirubin, Urine NEGATIVE      Urobilinogen, Urine Normal      Nitrite, Urine NEGATIVE      Leukocyte Esterase, Urine NEGATIVE     CBC WITH AUTO DIFFERENTIAL - Abnormal    WBC 4.9      nRBC 0.0      RBC 3.02 (*)     Hemoglobin 9.2 (*)     Hematocrit 27.7 (*)     MCV 92      MCH 30.5      MCHC 33.2      RDW 11.4 (*)     Platelets 200      Neutrophils % 45.5      Immature Granulocytes %, Automated 0.2      Lymphocytes % 41.6      Monocytes % 8.8      Eosinophils % 3.5      Basophils % 0.4      Neutrophils Absolute 2.22      Immature Granulocytes Absolute, Automated 0.01      Lymphocytes Absolute 2.03      Monocytes Absolute 0.43      Eosinophils Absolute 0.17      Basophils Absolute 0.02     COMPREHENSIVE METABOLIC PANEL - Abnormal    Glucose 85      Sodium 143      Potassium 3.7      Chloride 108 (*)     Bicarbonate 32      Anion Gap 7 (*)     Urea Nitrogen 12      Creatinine 0.63      eGFR >90      Calcium 8.6      Albumin 3.6      Alkaline  Phosphatase 61      Total Protein 6.7      AST 12      Bilirubin, Total 1.4 (*)     ALT 7     COAGULATION SCREEN - Normal    Protime 11.1      INR 1.0      aPTT 28      Narrative:     The APTT is no longer used for monitoring Unfractionated Heparin Therapy. For monitoring Heparin Therapy, use the Heparin Assay.   URINALYSIS WITH REFLEX CULTURE AND MICROSCOPIC    Narrative:     The following orders were created for panel order Urinalysis with Reflex Culture and Microscopic.  Procedure                               Abnormality         Status                     ---------                               -----------         ------                     Urinalysis with Reflex C...[[id number]]  Abnormal            Final result               Extra Urine Gray Tube[[id number]]                            Final result                 Please view results for these tests on the individual orders.   EXTRA URINE GRAY TUBE    Extra Tube         Vascular US lower extremity venous duplex bilateral   Final Result   1. No evidence of deep venous thrombosis in the bilateral lower   extremities.   2. Redemonstrated hypoechoic structure within the right proximal   medial calf without significant flow related signal. Primary   differential considerations include a inferiorly displaced baker cyst   or a hematoma.        I personally reviewed the images/study and I agree with the findings   as stated by Karishma Alford MD (PGY-3). This study was interpreted at   [place], [place].        MACRO:   None        Signed by: Nicolas Basilio 7/21/2025 7:20 AM   Dictation workstation:   [id number]      CT head wo IV contrast   Final Result   Normal brain CT.        I personally reviewed the images/study and I agree with the findings   as stated by Karishma Alford MD (PGY-3). This study was interpreted at   [place], [place].        MACRO:   None        Signed by:  Dick Seals 7/21/2025 7:11 AM   Dictation workstation:   [id number]      XR hip right with pelvis when performed 2 or 3 views   Final Result   Mild bilateral hip osteoarthrosis without acute osseous abnormality   of the pelvis or right hip.        I personally reviewed the images/study and I agree with the findings   as stated by Karishma Alford MD (PGY-3). This study was interpreted at   [place], [place].        MACRO:   None        Signed by: Bong Schroeder 7/21/2025 7:09 AM   Dictation workstation:   [id number]            Medical Decision Making:  - IV  - Labs  - Pain meds  - Right hip x-ray with pelvis  - BLE duplex  - CT head without contrast  - Reglan IV  - Clinda IV for possible LLE cellulitis.   - Re-evaluation for admission for PT/OT evaluation given difficulty walking.  - Signed out to oncoming team pending patient decision.     Differential Diagnoses Considered: Fracture, Hematoma, Migraine, Headache NOS, Cellulitis, DVT    Chronic Medical Conditions Significantly Affecting Care: HTN, glaucoma, legal blindness    Independent Interpretation of Studies:  I independently interpreted: Right hip with pelvis x-ray shows no acute fracture or dislocation.     MD Andrew Humphreys MD  07/22/25 0085

## 2025-07-21 NOTE — HH CARE COORDINATION
Home Care received a Referral for Nursing, Physical Therapy, and Occupational Therapy. We have processed the referral for a Start of Care on 7/23/25 .     If you have any questions or concerns, please feel free to contact us at [phone]. Follow the prompts, enter your five digit zip code, and you will be directed to your care team on [facility] 3.

## 2025-07-21 NOTE — DISCHARGE SUMMARY
Discharge Diagnosis  Cellulitis           Issues Requiring Follow-Up  PCP for cellulitis resolution    Discharge Meds     Medication List      START taking these medications     doxycycline 100 mg capsule; Commonly known as: Vibramycin; Take 1   capsule (100 mg) by mouth every 12 hours for 10 doses. Take with a full   glass of water and do not lie down for at least 30 minutes after.       Test Results Pending At Discharge  Pending Labs       No current pending labs.            Hospital Course   Ms Martinez is a 65y female with PMHx: HTN, glaucoma and legal blindness that presented to the ED with low back pain states she has had the symptoms for about a week.  While down in the ED she was found to have cellulitis-left lower extremity.  Patient was also stating that she was having difficulty ambulating while in the ED she had a PT OT eval and they recommended low intensity and she should use a walker.  Furthermore patient also refused any type of SNF placement.  During interview offered patient home health PT OT but patient refused stated she did not need this offered outpatient PT OT and patient was willing to do this route.  Also offered patient a walker but she declined.  Patient stated she just wanted to stay overnight and have physical therapy see her in the morning explained to patient she was considered low intensity and physical therapy would no longer see her in the hospital as we are diagnostic here not therapeutic.  Also unable to examine patient as she is in the hallway and fully clothed and there is no privacy area to place her in    While in the ED vitals were stable patient afebrile.  Labs were stable Hgb 9.2.  UA negative.  CT head negative.  Right hip x-ray just shows mild R osteoarthrosis with no acute abnormality.  Patient to be admitted observation for cellulitis  Assessment & Plan  Cellulitis  - Patient given dose of clindamycin in the ED  -Will order doxycycline 100 mg twice daily  Chronic midline  low back pain without sciatica  Primary osteoarthritis of right hip  Gait instability  - PT OT eval and treat  -PT recommends low intensity  -Patient declines HHC willing to do outpatient PT OT  -Patient declines walker  -Will order healthy at home  Hypertension  - Stable follow-up with PCP no home meds at this time  Steroid induced glaucoma, left eye  - Stable follow-up with ophthalmology outpatient  Iron deficiency anemia secondary to inadequate dietary iron intake  - Hgb 9.2 stable no acute bleeding noted    Patient discharged home in stable condition with stable vital signs declined [facility] instead opted for outpatient PT OT which was ordered also declined walker.  Also opted for healthy at home referral.    Pertinent Physical Exam At Time of Discharge  Physical Exam  Vitals reviewed.   Constitutional:       Appearance: Normal appearance.   HENT:      Head: Normocephalic.      Mouth/Throat:      Mouth: Mucous membranes are dry.     Eyes:      Extraocular Movements: Extraocular movements intact.      Conjunctiva/sclera: Conjunctivae normal.      Pupils: Pupils are equal, round, and reactive to light.       Cardiovascular:      Rate and Rhythm: Normal rate and regular rhythm.      Pulses: Normal pulses.      Heart sounds: Normal heart sounds, S1 normal and S2 normal.   Pulmonary:      Effort: Pulmonary effort is normal.      Breath sounds: Normal breath sounds and air entry.   Abdominal:      General: Abdomen is flat. Bowel sounds are normal.      Palpations: Abdomen is soft.     Musculoskeletal:         General: Normal range of motion.      Cervical back: Full passive range of motion without pain and normal range of motion.     Skin:     General: Skin is warm and dry.     Neurological:      General: No focal deficit present.      Mental Status: She is alert and oriented to person, place, and time. Mental status is at baseline.     Psychiatric:         Attention and Perception: Attention normal.         Mood and  Affect: Mood normal.         Speech: Speech normal.         Outpatient Follow-Up  No future appointments.      Becca Miller, APRN-CNP

## 2025-07-21 NOTE — ED TRIAGE NOTES
Pt complains of right hip/lower back pain, denies any injury or fall. Pt also complains of headache,and problems with her skin healing. Has a weeping wound on her left lower leg, pt is visually impaired
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
## 2025-07-21 NOTE — ASSESSMENT & PLAN NOTE
- PT OT eval and treat  -PT recommends low intensity  -Patient declines [facility] willing to do outpatient PT OT  -Patient declines walker  -Will order healthy at home

## 2025-07-21 NOTE — PROGRESS NOTES
Physical Therapy    Physical Therapy Evaluation    Patient Name: Ny Martinez  MRN: [id number]  Department: [facility] [place]  Room: [id number]/[id number]  Today's Date: 7/21/2025   Time Calculation  Start Time: 1129  Stop Time: 1148  Time Calculation (min): 19 min    Assessment/Plan   PT Assessment  PT Assessment Results: Decreased endurance, Impaired balance, Decreased mobility, Pain  Rehab Prognosis: Good  Barriers to Discharge Home: Physical needs  Physical Needs: Stair navigation into home limited by function/safety  End of Session Communication: Bedside nurse  Assessment Comment: Pt tolerated PT session fairly well. Demo'd antalgic gait pattern with shuffled pattern with inc forward flexion. Improved stability and control noted with use of FWW, encouraged pt to use for safe mobility at this time.  End of Session Patient Position: Bed, 1 rail up (ED bed)  IP OR SWING BED PT PLAN  Inpatient or Swing Bed: Inpatient  PT Plan  Treatment/Interventions: Bed mobility, Transfer training, Gait training, Stair training, Balance training, Endurance training, Strengthening, Therapeutic exercise, Therapeutic activity, Home exercise program  PT Plan: Ongoing PT  PT Frequency: 3 times per week  PT Discharge Recommendations: Low intensity level of continued care  Equipment Recommended upon Discharge: Wheeled walker  PT Recommended Transfer Status: Assist x1  PT - OK to Discharge: Yes (meaning PT eval complete and d/c recommendation made)    Subjective     PT Visit Info:  PT Received On: 07/21/25  General Visit Information:  General  Reason for Referral: intermittent R low back pain  Past Medical History Relevant to Rehab: HTN, glaucoma, legal blindness  Family/Caregiver Present: No  Prior to Session Communication: Bedside nurse  Patient Position Received:  (up in wheelchair)  General Comment: Pt cooperative  Home Living:  Home Living  Type of Home: House  Lives With: Alone  Home Adaptive Equipment: None  Home Layout: Two level, Stairs to  alternate level with rails, 1/2 bath on main level  Alternate Level Stairs-Number of Steps: 12  Home Access: Stairs to enter without rails  Entrance Stairs-Number of Steps: 3  Prior Level of Function:  Prior Function Per Pt/Caregiver Report  Level of [place]: Independent with ADLs and functional transfers, Independent with homemaking with ambulation  ADL Assistance: Independent  Homemaking Assistance: Independent  Ambulatory Assistance: Independent  Prior Function Comments: uses public transportation  Precautions:  Precautions  Medical Precautions: Fall precautions           Objective   Pain:  Pain Assessment  Pain Assessment: 0-10  0-10 (Numeric) Pain Score: 7  Pain Type: Acute pain  Pain Location:  (low back, hip and posterior leg)  Pain Orientation: Right  Cognition:  Cognition  Overall Cognitive Status: Within Functional Limits  Orientation Level: Oriented X4    General Assessments:        Activity Tolerance  Endurance: Tolerates 10 - 20 min exercise with multiple rests    Sensation  Light Touch: No apparent deficits    Static Sitting Balance  Static Sitting-Balance Support: Feet supported  Static Sitting-Level of Assistance: Independent    Static Standing Balance  Static Standing-Balance Support: Bilateral upper extremity supported  Static Standing-Level of Assistance: Distant supervision  Functional Assessments:  Bed Mobility  Bed Mobility: Yes  Bed Mobility 1  Bed Mobility 1: Sitting to supine  Level of Assistance 1: Modified independent    Transfers  Transfer: Yes  Transfer 1  Transfer From 1: Sit to, Stand to  Transfer to 1: Stand, Sit  Technique 1: Sit to stand, Stand to sit  Transfer Level of Assistance 1: Close supervision  Transfers 2  Transfer From 2: Sit to, Stand to  Transfer to 2: Stand, Sit  Technique 2: Sit to stand, Stand to sit  Transfer Device 2: Walker  Transfer Level of Assistance 2: Distant supervision    Ambulation/Gait Training  Ambulation/Gait Training Performed: Yes  Ambulation/Gait  Training 1  Surface 1: Level tile  Device 1: No device  Assistance 1: Close supervision  Quality of Gait 1: Antalgic, Forward flexed posture, Diminished heel strike, Decreased step length  Comments/Distance (ft) 1: 10ft  Ambulation/Gait Training 2  Surface 2: Level tile  Device 2: Rolling walker  Assistance 2: Distant supervision  Quality of Gait 2: Decreased step length, Forward flexed posture  Comments/Distance (ft) 2: 10ft    Stairs  Stairs: No  Extremity/Trunk Assessments:  RLE   RLE : Within Functional Limits  LLE   LLE : Within Functional Limits  Outcome Measures:  [facility] Basic Mobility  Turning from your back to your side while in a flat bed without using bedrails: A little  Moving from lying on your back to sitting on the side of a flat bed without using bedrails: A little  Moving to and from bed to chair (including a wheelchair): A little  Standing up from a chair using your arms (e.g. wheelchair or bedside chair): A little  To walk in hospital room: A little  Climbing 3-5 steps with railing: A little  Basic Mobility - Total Score: 18    Encounter Problems       Encounter Problems (Active)       Balance       Pt will complete dynamic reaching, in standing x5 reps without LOB (Progressing)       Start:  07/21/25    Expected End:  07/28/25               Mobility       Pt will amb 100ft with LRAD mod I (Progressing)       Start:  07/21/25    Expected End:  07/28/25            Pt will ascend/descend single flight of stairs with unilateral railing mod I (Progressing)       Start:  07/21/25    Expected End:  07/28/25               PT Transfers       Pt will perform sit<>stand with LRAD mod I (Progressing)       Start:  07/21/25    Expected End:  07/28/25            Pt will perform supine<>sit mod I (Progressing)       Start:  07/21/25    Expected End:  07/28/25                   Education Documentation  Body Mechanics, taught by Vickie De Dios PT at 7/21/2025  1:22 PM.  Learner: Patient  Readiness:  Acceptance  Method: Explanation  Response: Verbalizes Understanding  Comment: goal of session, PT POC, use of walker, fall precautions    Mobility Training, taught by Vickie De Dios PT at 7/21/2025  1:22 PM.  Learner: Patient  Readiness: Acceptance  Method: Explanation  Response: Verbalizes Understanding  Comment: goal of session, PT POC, use of walker, fall precautions    Education Comments  No comments found.

## 2025-07-21 NOTE — ASSESSMENT & PLAN NOTE
- PT OT eval and treat  -PT recommends low intensity  -Patient declines [facility] willing to do outpatient PT OT  -Patient declines walker  -Will order healthy at home

## 2025-07-21 NOTE — ASSESSMENT & PLAN NOTE
- PT OT eval and treat  -PT recommends low intensity  -Patient declines [facility] willing to do outpatient PT OT  -Patient declines walker  -Will order healthy at home

## 2025-07-21 NOTE — DISCHARGE INSTRUCTIONS
Take doxycycline 100mg twice a day for 5 days.  Follow up with PCP to make sure resolving  Follow up outpatient with PT/OT

## 2025-07-22 ENCOUNTER — PATIENT OUTREACH (OUTPATIENT)
Dept: CARE COORDINATION | Age: 65
End: 2025-07-22

## 2025-07-22 ENCOUNTER — DOCUMENTATION (OUTPATIENT)
Dept: HOME HEALTH SERVICES | Facility: HOME HEALTH | Age: 65
End: 2025-07-22

## 2025-07-22 NOTE — HH CARE COORDINATION
Home Care received a referral for Nursing, Physical Therapy, and Occupational Therapy. Unfortunately, we are unable to accept and process the referral at this time.    Reason:  Patient Declines Home Care     Patients, please reach out to the referring provider or your PCP to assist in obtaining an alternative home care agency and/or guidance to meet your needs.    Providers, please reach out to  Home Care at [phone] with any questions regarding the declined referral.

## 2025-07-23 NOTE — PROGRESS NOTES
[facility] Note: Enrollment Outreach 2025    Enrollment Outreach: RN attempted to reach patient, left voice mail message and noted our team would reach out tomorrow

## 2025-07-24 ENCOUNTER — PATIENT OUTREACH (OUTPATIENT)
Dept: CARE COORDINATION | Age: 65
End: 2025-07-24

## 2025-08-03 ENCOUNTER — HOSPITAL ENCOUNTER (INPATIENT)
Facility: HOSPITAL | Age: 65
LOS: 1 days | Discharge: HOME | DRG: 880 | End: 2025-08-05
Attending: EMERGENCY MEDICINE | Admitting: STUDENT IN AN ORGANIZED HEALTH CARE EDUCATION/TRAINING PROGRAM
Payer: MEDICARE

## 2025-08-03 DIAGNOSIS — G45.9 TIA (TRANSIENT ISCHEMIC ATTACK): ICD-10-CM

## 2025-08-03 DIAGNOSIS — I63.9 ACUTE ISCHEMIC STROKE (MULTI): Primary | ICD-10-CM

## 2025-08-03 DIAGNOSIS — I10 PRIMARY HYPERTENSION: ICD-10-CM

## 2025-08-03 DIAGNOSIS — R06.02 SOB (SHORTNESS OF BREATH): ICD-10-CM

## 2025-08-03 DIAGNOSIS — I63.9 CEREBROVASCULAR ACCIDENT (CVA), UNSPECIFIED MECHANISM (MULTI): ICD-10-CM

## 2025-08-03 LAB — GLUCOSE BLD MANUAL STRIP-MCNC: 101 MG/DL (ref 74–99)

## 2025-08-03 PROCEDURE — 99291 CRITICAL CARE FIRST HOUR: CPT | Performed by: EMERGENCY MEDICINE

## 2025-08-03 PROCEDURE — 82947 ASSAY GLUCOSE BLOOD QUANT: CPT

## 2025-08-03 ASSESSMENT — PAIN SCALES - GENERAL: PAINLEVEL_OUTOF10: 6

## 2025-08-03 ASSESSMENT — PAIN - FUNCTIONAL ASSESSMENT: PAIN_FUNCTIONAL_ASSESSMENT: 0-10

## 2025-08-04 ENCOUNTER — APPOINTMENT (OUTPATIENT)
Dept: RADIOLOGY | Facility: HOSPITAL | Age: 65
DRG: 880 | End: 2025-08-04
Payer: MEDICARE

## 2025-08-04 ENCOUNTER — APPOINTMENT (OUTPATIENT)
Dept: CARDIOLOGY | Facility: HOSPITAL | Age: 65
DRG: 880 | End: 2025-08-04
Payer: MEDICARE

## 2025-08-04 PROBLEM — I63.9 ACUTE ISCHEMIC STROKE (MULTI): Status: ACTIVE | Noted: 2025-08-04

## 2025-08-04 LAB
ALBUMIN SERPL BCP-MCNC: 3.7 G/DL (ref 3.4–5)
ALBUMIN SERPL BCP-MCNC: 4.2 G/DL (ref 3.4–5)
ALP SERPL-CCNC: 54 U/L (ref 33–136)
ALT SERPL W P-5'-P-CCNC: 11 U/L (ref 7–45)
ANION GAP SERPL CALC-SCNC: 11 MMOL/L (ref 10–20)
ANION GAP SERPL CALC-SCNC: 11 MMOL/L (ref 10–20)
APTT PPP: 31 SECONDS (ref 26–36)
AST SERPL W P-5'-P-CCNC: 15 U/L (ref 9–39)
ATRIAL RATE: 59 BPM
BASOPHILS # BLD AUTO: 0.03 X10*3/UL (ref 0–0.1)
BASOPHILS NFR BLD AUTO: 0.8 %
BILIRUB SERPL-MCNC: 1 MG/DL (ref 0–1.2)
BNP SERPL-MCNC: 75 PG/ML (ref 0–99)
BUN SERPL-MCNC: 11 MG/DL (ref 6–23)
BUN SERPL-MCNC: 14 MG/DL (ref 6–23)
CALCIUM SERPL-MCNC: 8.5 MG/DL (ref 8.6–10.3)
CALCIUM SERPL-MCNC: 8.9 MG/DL (ref 8.6–10.3)
CARDIAC TROPONIN I PNL SERPL HS: 4 NG/L (ref 0–13)
CHLORIDE SERPL-SCNC: 103 MMOL/L (ref 98–107)
CHLORIDE SERPL-SCNC: 104 MMOL/L (ref 98–107)
CHOLEST SERPL-MCNC: 137 MG/DL (ref 0–199)
CHOLESTEROL/HDL RATIO: 1.6
CO2 SERPL-SCNC: 30 MMOL/L (ref 21–32)
CO2 SERPL-SCNC: 30 MMOL/L (ref 21–32)
CREAT SERPL-MCNC: 0.53 MG/DL (ref 0.5–1.05)
CREAT SERPL-MCNC: 0.66 MG/DL (ref 0.5–1.05)
EGFRCR SERPLBLD CKD-EPI 2021: >90 ML/MIN/1.73M*2
EGFRCR SERPLBLD CKD-EPI 2021: >90 ML/MIN/1.73M*2
EOSINOPHIL # BLD AUTO: 0.12 X10*3/UL (ref 0–0.7)
EOSINOPHIL NFR BLD AUTO: 3.1 %
ERYTHROCYTE [DISTWIDTH] IN BLOOD BY AUTOMATED COUNT: 12.6 % (ref 11.5–14.5)
ERYTHROCYTE [DISTWIDTH] IN BLOOD BY AUTOMATED COUNT: 12.7 % (ref 11.5–14.5)
EST. AVERAGE GLUCOSE BLD GHB EST-MCNC: 85 MG/DL
FERRITIN SERPL-MCNC: 65 NG/ML (ref 8–150)
FOLATE SERPL-MCNC: 12.7 NG/ML
GLUCOSE BLD MANUAL STRIP-MCNC: 74 MG/DL (ref 74–99)
GLUCOSE BLD MANUAL STRIP-MCNC: 92 MG/DL (ref 74–99)
GLUCOSE BLD MANUAL STRIP-MCNC: 94 MG/DL (ref 74–99)
GLUCOSE SERPL-MCNC: 103 MG/DL (ref 74–99)
GLUCOSE SERPL-MCNC: 87 MG/DL (ref 74–99)
HBA1C MFR BLD: 4.6 % (ref ?–5.7)
HCT VFR BLD AUTO: 29 % (ref 36–46)
HCT VFR BLD AUTO: 33.6 % (ref 36–46)
HDLC SERPL-MCNC: 84.4 MG/DL
HGB BLD-MCNC: 10.9 G/DL (ref 12–16)
HGB BLD-MCNC: 9.4 G/DL (ref 12–16)
HGB RETIC QN: 34 PG (ref 28–38)
HOLD SPECIMEN: NORMAL
IMM GRANULOCYTES # BLD AUTO: 0 X10*3/UL (ref 0–0.7)
IMM GRANULOCYTES NFR BLD AUTO: 0 % (ref 0–0.9)
IMMATURE RETIC FRACTION: 7.1 %
INR PPP: 1.1 (ref 0.9–1.1)
IRON SATN MFR SERPL: 23 % (ref 25–45)
IRON SERPL-MCNC: 53 UG/DL (ref 35–150)
LDLC SERPL CALC-MCNC: 44 MG/DL
LYMPHOCYTES # BLD AUTO: 1.22 X10*3/UL (ref 1.2–4.8)
LYMPHOCYTES NFR BLD AUTO: 31.9 %
MAGNESIUM SERPL-MCNC: 2.14 MG/DL (ref 1.6–2.4)
MCH RBC QN AUTO: 31.2 PG (ref 26–34)
MCH RBC QN AUTO: 31.6 PG (ref 26–34)
MCHC RBC AUTO-ENTMCNC: 32.4 G/DL (ref 32–36)
MCHC RBC AUTO-ENTMCNC: 32.4 G/DL (ref 32–36)
MCV RBC AUTO: 96 FL (ref 80–100)
MCV RBC AUTO: 97 FL (ref 80–100)
MONOCYTES # BLD AUTO: 0.39 X10*3/UL (ref 0.1–1)
MONOCYTES NFR BLD AUTO: 10.2 %
NEUTROPHILS # BLD AUTO: 2.07 X10*3/UL (ref 1.2–7.7)
NEUTROPHILS NFR BLD AUTO: 54 %
NON HDL CHOLESTEROL: 53 MG/DL (ref 0–149)
NRBC BLD-RTO: 0 /100 WBCS (ref 0–0)
NRBC BLD-RTO: 0 /100 WBCS (ref 0–0)
P AXIS: 74 DEGREES
P OFFSET: 185 MS
P ONSET: 128 MS
PHOSPHATE SERPL-MCNC: 3.7 MG/DL (ref 2.5–4.9)
PLATELET # BLD AUTO: 202 X10*3/UL (ref 150–450)
PLATELET # BLD AUTO: 243 X10*3/UL (ref 150–450)
POTASSIUM SERPL-SCNC: 3.9 MMOL/L (ref 3.5–5.3)
POTASSIUM SERPL-SCNC: 4 MMOL/L (ref 3.5–5.3)
PR INTERVAL: 176 MS
PROT SERPL-MCNC: 6.9 G/DL (ref 6.4–8.2)
PROTHROMBIN TIME: 11.8 SECONDS (ref 9.8–12.4)
Q ONSET: 216 MS
QRS COUNT: 10 BEATS
QRS DURATION: 82 MS
QT INTERVAL: 426 MS
QTC CALCULATION(BAZETT): 421 MS
QTC FREDERICIA: 423 MS
R AXIS: -48 DEGREES
RBC # BLD AUTO: 3.01 X10*6/UL (ref 4–5.2)
RBC # BLD AUTO: 3.45 X10*6/UL (ref 4–5.2)
RETICS #: 0.08 X10*6/UL (ref 0.02–0.08)
RETICS/RBC NFR AUTO: 2.7 % (ref 0.5–2)
SODIUM SERPL-SCNC: 140 MMOL/L (ref 136–145)
SODIUM SERPL-SCNC: 141 MMOL/L (ref 136–145)
T AXIS: 48 DEGREES
T OFFSET: 429 MS
TIBC SERPL-MCNC: 232 UG/DL (ref 240–445)
TRIGL SERPL-MCNC: 41 MG/DL (ref 0–149)
UIBC SERPL-MCNC: 179 UG/DL (ref 110–370)
VENTRICULAR RATE: 59 BPM
VIT B12 SERPL-MCNC: 297 PG/ML (ref 211–911)
VLDL: 8 MG/DL (ref 0–40)
WBC # BLD AUTO: 3.8 X10*3/UL (ref 4.4–11.3)
WBC # BLD AUTO: 4.1 X10*3/UL (ref 4.4–11.3)

## 2025-08-04 PROCEDURE — 2500000001 HC RX 250 WO HCPCS SELF ADMINISTERED DRUGS (ALT 637 FOR MEDICARE OP)

## 2025-08-04 PROCEDURE — 80061 LIPID PANEL: CPT

## 2025-08-04 PROCEDURE — 70547 MR ANGIOGRAPHY NECK W/O DYE: CPT | Performed by: RADIOLOGY

## 2025-08-04 PROCEDURE — 70450 CT HEAD/BRAIN W/O DYE: CPT | Performed by: RADIOLOGY

## 2025-08-04 PROCEDURE — 70450 CT HEAD/BRAIN W/O DYE: CPT

## 2025-08-04 PROCEDURE — 97165 OT EVAL LOW COMPLEX 30 MIN: CPT | Mod: GO | Performed by: OCCUPATIONAL THERAPIST

## 2025-08-04 PROCEDURE — 85027 COMPLETE CBC AUTOMATED: CPT

## 2025-08-04 PROCEDURE — 70544 MR ANGIOGRAPHY HEAD W/O DYE: CPT

## 2025-08-04 PROCEDURE — G0426 INPT/ED TELECONSULT50: HCPCS | Performed by: STUDENT IN AN ORGANIZED HEALTH CARE EDUCATION/TRAINING PROGRAM

## 2025-08-04 PROCEDURE — 85045 AUTOMATED RETICULOCYTE COUNT: CPT

## 2025-08-04 PROCEDURE — 70547 MR ANGIOGRAPHY NECK W/O DYE: CPT

## 2025-08-04 PROCEDURE — 97530 THERAPEUTIC ACTIVITIES: CPT | Mod: GP | Performed by: PHYSICAL THERAPIST

## 2025-08-04 PROCEDURE — 36415 COLL VENOUS BLD VENIPUNCTURE: CPT

## 2025-08-04 PROCEDURE — 97161 PT EVAL LOW COMPLEX 20 MIN: CPT | Mod: GP | Performed by: PHYSICAL THERAPIST

## 2025-08-04 PROCEDURE — 80053 COMPREHEN METABOLIC PANEL: CPT | Performed by: EMERGENCY MEDICINE

## 2025-08-04 PROCEDURE — 80069 RENAL FUNCTION PANEL: CPT | Mod: CCI

## 2025-08-04 PROCEDURE — 70551 MRI BRAIN STEM W/O DYE: CPT

## 2025-08-04 PROCEDURE — 36415 COLL VENOUS BLD VENIPUNCTURE: CPT | Performed by: EMERGENCY MEDICINE

## 2025-08-04 PROCEDURE — 99223 1ST HOSP IP/OBS HIGH 75: CPT | Performed by: STUDENT IN AN ORGANIZED HEALTH CARE EDUCATION/TRAINING PROGRAM

## 2025-08-04 PROCEDURE — 70544 MR ANGIOGRAPHY HEAD W/O DYE: CPT | Performed by: RADIOLOGY

## 2025-08-04 PROCEDURE — 83735 ASSAY OF MAGNESIUM: CPT

## 2025-08-04 PROCEDURE — 2500000001 HC RX 250 WO HCPCS SELF ADMINISTERED DRUGS (ALT 637 FOR MEDICARE OP): Performed by: EMERGENCY MEDICINE

## 2025-08-04 PROCEDURE — 82947 ASSAY GLUCOSE BLOOD QUANT: CPT

## 2025-08-04 PROCEDURE — 82746 ASSAY OF FOLIC ACID SERUM: CPT | Mod: STJLAB

## 2025-08-04 PROCEDURE — 85025 COMPLETE CBC W/AUTO DIFF WBC: CPT | Performed by: EMERGENCY MEDICINE

## 2025-08-04 PROCEDURE — 82607 VITAMIN B-12: CPT | Mod: STJLAB

## 2025-08-04 PROCEDURE — 2500000004 HC RX 250 GENERAL PHARMACY W/ HCPCS (ALT 636 FOR OP/ED)

## 2025-08-04 PROCEDURE — 83036 HEMOGLOBIN GLYCOSYLATED A1C: CPT | Mod: STJLAB

## 2025-08-04 PROCEDURE — 99291 CRITICAL CARE FIRST HOUR: CPT | Performed by: EMERGENCY MEDICINE

## 2025-08-04 PROCEDURE — 84100 ASSAY OF PHOSPHORUS: CPT

## 2025-08-04 PROCEDURE — 93005 ELECTROCARDIOGRAM TRACING: CPT

## 2025-08-04 PROCEDURE — 82728 ASSAY OF FERRITIN: CPT

## 2025-08-04 PROCEDURE — 70551 MRI BRAIN STEM W/O DYE: CPT | Performed by: RADIOLOGY

## 2025-08-04 PROCEDURE — 96372 THER/PROPH/DIAG INJ SC/IM: CPT

## 2025-08-04 PROCEDURE — 1200000002 HC GENERAL ROOM WITH TELEMETRY DAILY

## 2025-08-04 PROCEDURE — 85610 PROTHROMBIN TIME: CPT | Performed by: EMERGENCY MEDICINE

## 2025-08-04 PROCEDURE — 85730 THROMBOPLASTIN TIME PARTIAL: CPT | Performed by: EMERGENCY MEDICINE

## 2025-08-04 PROCEDURE — 83880 ASSAY OF NATRIURETIC PEPTIDE: CPT

## 2025-08-04 PROCEDURE — 84484 ASSAY OF TROPONIN QUANT: CPT | Performed by: EMERGENCY MEDICINE

## 2025-08-04 PROCEDURE — 83540 ASSAY OF IRON: CPT

## 2025-08-04 PROCEDURE — 99233 SBSQ HOSP IP/OBS HIGH 50: CPT | Performed by: SPECIALIST

## 2025-08-04 RX ORDER — ACETAMINOPHEN 325 MG/1
975 TABLET ORAL ONCE
Status: COMPLETED | OUTPATIENT
Start: 2025-08-04 | End: 2025-08-04

## 2025-08-04 RX ORDER — LABETALOL HYDROCHLORIDE 5 MG/ML
10 INJECTION, SOLUTION INTRAVENOUS EVERY 10 MIN PRN
Status: DISCONTINUED | OUTPATIENT
Start: 2025-08-04 | End: 2025-08-04

## 2025-08-04 RX ORDER — ASPIRIN 81 MG/1
81 TABLET ORAL DAILY
Status: DISCONTINUED | OUTPATIENT
Start: 2025-08-04 | End: 2025-08-05 | Stop reason: HOSPADM

## 2025-08-04 RX ORDER — ENOXAPARIN SODIUM 100 MG/ML
40 INJECTION SUBCUTANEOUS EVERY 24 HOURS
Status: DISCONTINUED | OUTPATIENT
Start: 2025-08-04 | End: 2025-08-05 | Stop reason: HOSPADM

## 2025-08-04 RX ORDER — ATORVASTATIN CALCIUM 80 MG/1
80 TABLET, FILM COATED ORAL NIGHTLY
Status: DISCONTINUED | OUTPATIENT
Start: 2025-08-04 | End: 2025-08-04

## 2025-08-04 RX ORDER — POLYETHYLENE GLYCOL 3350 17 G/17G
17 POWDER, FOR SOLUTION ORAL DAILY PRN
Status: DISCONTINUED | OUTPATIENT
Start: 2025-08-04 | End: 2025-08-05 | Stop reason: HOSPADM

## 2025-08-04 RX ORDER — ATORVASTATIN CALCIUM 40 MG/1
40 TABLET, FILM COATED ORAL NIGHTLY
Status: DISCONTINUED | OUTPATIENT
Start: 2025-08-04 | End: 2025-08-05 | Stop reason: HOSPADM

## 2025-08-04 RX ORDER — LISINOPRIL 20 MG/1
20 TABLET ORAL DAILY
Status: ON HOLD | COMMUNITY
End: 2025-08-05

## 2025-08-04 RX ORDER — LISINOPRIL 20 MG/1
20 TABLET ORAL DAILY
Status: DISCONTINUED | OUTPATIENT
Start: 2025-08-04 | End: 2025-08-05 | Stop reason: HOSPADM

## 2025-08-04 RX ORDER — NAPROXEN SODIUM 220 MG/1
324 TABLET, FILM COATED ORAL ONCE
Status: COMPLETED | OUTPATIENT
Start: 2025-08-04 | End: 2025-08-04

## 2025-08-04 RX ADMIN — ATORVASTATIN CALCIUM 40 MG: 40 TABLET, FILM COATED ORAL at 02:35

## 2025-08-04 RX ADMIN — ASPIRIN 81 MG CHEWABLE TABLET 324 MG: 81 TABLET CHEWABLE at 01:41

## 2025-08-04 RX ADMIN — ASPIRIN 81 MG: 81 TABLET, COATED ORAL at 11:02

## 2025-08-04 RX ADMIN — ENOXAPARIN SODIUM 40 MG: 100 INJECTION SUBCUTANEOUS at 11:03

## 2025-08-04 RX ADMIN — ACETAMINOPHEN 975 MG: 325 TABLET ORAL at 13:48

## 2025-08-04 RX ADMIN — ATORVASTATIN CALCIUM 40 MG: 40 TABLET, FILM COATED ORAL at 20:17

## 2025-08-04 SDOH — SOCIAL STABILITY: SOCIAL INSECURITY: HAVE YOU HAD THOUGHTS OF HARMING ANYONE ELSE?: NO

## 2025-08-04 SDOH — SOCIAL STABILITY: SOCIAL INSECURITY: WITHIN THE LAST YEAR, HAVE YOU BEEN HUMILIATED OR EMOTIONALLY ABUSED IN OTHER WAYS BY YOUR PARTNER OR EX-PARTNER?: NO

## 2025-08-04 SDOH — ECONOMIC STABILITY: HOUSING INSECURITY: AT ANY TIME IN THE PAST 12 MONTHS, WERE YOU HOMELESS OR LIVING IN A SHELTER (INCLUDING NOW)?: PATIENT DECLINED

## 2025-08-04 SDOH — SOCIAL STABILITY: SOCIAL INSECURITY: ARE THERE ANY APPARENT SIGNS OF INJURIES/BEHAVIORS THAT COULD BE RELATED TO ABUSE/NEGLECT?: NO

## 2025-08-04 SDOH — SOCIAL STABILITY: SOCIAL INSECURITY: HAS ANYONE EVER THREATENED TO HURT YOUR FAMILY OR YOUR PETS?: NO

## 2025-08-04 SDOH — ECONOMIC STABILITY: HOUSING INSECURITY: IN THE PAST 12 MONTHS, HOW MANY TIMES HAVE YOU MOVED WHERE YOU WERE LIVING?: 0

## 2025-08-04 SDOH — ECONOMIC STABILITY: TRANSPORTATION INSECURITY
IN THE PAST 12 MONTHS, HAS LACK OF TRANSPORTATION KEPT YOU FROM MEDICAL APPOINTMENTS OR FROM GETTING MEDICATIONS?: PATIENT DECLINED

## 2025-08-04 SDOH — ECONOMIC STABILITY: FOOD INSECURITY
WITHIN THE PAST 12 MONTHS, YOU WORRIED THAT YOUR FOOD WOULD RUN OUT BEFORE YOU GOT THE MONEY TO BUY MORE.: PATIENT DECLINED

## 2025-08-04 SDOH — SOCIAL STABILITY: SOCIAL INSECURITY
WITHIN THE LAST YEAR, HAVE YOU BEEN RAPED OR FORCED TO HAVE ANY KIND OF SEXUAL ACTIVITY BY YOUR PARTNER OR EX-PARTNER?: NO

## 2025-08-04 SDOH — ECONOMIC STABILITY: FOOD INSECURITY: HOW HARD IS IT FOR YOU TO PAY FOR THE VERY BASICS LIKE FOOD, HOUSING, MEDICAL CARE, AND HEATING?: PATIENT DECLINED

## 2025-08-04 SDOH — SOCIAL STABILITY: SOCIAL INSECURITY: ABUSE: ADULT

## 2025-08-04 SDOH — SOCIAL STABILITY: SOCIAL INSECURITY: DO YOU FEEL UNSAFE GOING BACK TO THE PLACE WHERE YOU ARE LIVING?: NO

## 2025-08-04 SDOH — ECONOMIC STABILITY: HOUSING INSECURITY: IN THE LAST 12 MONTHS, WAS THERE A TIME WHEN YOU WERE NOT ABLE TO PAY THE MORTGAGE OR RENT ON TIME?: PATIENT DECLINED

## 2025-08-04 SDOH — SOCIAL STABILITY: SOCIAL INSECURITY
WITHIN THE LAST YEAR, HAVE YOU BEEN KICKED, HIT, SLAPPED, OR OTHERWISE PHYSICALLY HURT BY YOUR PARTNER OR EX-PARTNER?: NO

## 2025-08-04 SDOH — SOCIAL STABILITY: SOCIAL INSECURITY: WITHIN THE LAST YEAR, HAVE YOU BEEN AFRAID OF YOUR PARTNER OR EX-PARTNER?: NO

## 2025-08-04 SDOH — ECONOMIC STABILITY: INCOME INSECURITY
IN THE PAST 12 MONTHS HAS THE ELECTRIC, GAS, OIL, OR WATER COMPANY THREATENED TO SHUT OFF SERVICES IN YOUR HOME?: PATIENT DECLINED

## 2025-08-04 SDOH — SOCIAL STABILITY: SOCIAL INSECURITY: DO YOU FEEL ANYONE HAS EXPLOITED OR TAKEN ADVANTAGE OF YOU FINANCIALLY OR OF YOUR PERSONAL PROPERTY?: NO

## 2025-08-04 SDOH — SOCIAL STABILITY: SOCIAL INSECURITY: ARE YOU OR HAVE YOU BEEN THREATENED OR ABUSED PHYSICALLY, EMOTIONALLY, OR SEXUALLY BY ANYONE?: NO

## 2025-08-04 SDOH — SOCIAL STABILITY: SOCIAL INSECURITY: WERE YOU ABLE TO COMPLETE ALL THE BEHAVIORAL HEALTH SCREENINGS?: YES

## 2025-08-04 SDOH — ECONOMIC STABILITY: FOOD INSECURITY: WITHIN THE PAST 12 MONTHS, THE FOOD YOU BOUGHT JUST DIDN'T LAST AND YOU DIDN'T HAVE MONEY TO GET MORE.: PATIENT DECLINED

## 2025-08-04 SDOH — SOCIAL STABILITY: SOCIAL INSECURITY: DOES ANYONE TRY TO KEEP YOU FROM HAVING/CONTACTING OTHER FRIENDS OR DOING THINGS OUTSIDE YOUR HOME?: NO

## 2025-08-04 ASSESSMENT — COGNITIVE AND FUNCTIONAL STATUS - GENERAL
DAILY ACTIVITIY SCORE: 15
DRESSING REGULAR LOWER BODY CLOTHING: A LITTLE
STANDING UP FROM CHAIR USING ARMS: A LITTLE
CLIMB 3 TO 5 STEPS WITH RAILING: A LOT
MOVING FROM LYING ON BACK TO SITTING ON SIDE OF FLAT BED WITH BEDRAILS: A LITTLE
PERSONAL GROOMING: A LITTLE
WALKING IN HOSPITAL ROOM: A LOT
CLIMB 3 TO 5 STEPS WITH RAILING: A LOT
MOVING TO AND FROM BED TO CHAIR: A LITTLE
MOBILITY SCORE: 19
DAILY ACTIVITIY SCORE: 23
MOBILITY SCORE: 16
DAILY ACTIVITIY SCORE: 23
DRESSING REGULAR LOWER BODY CLOTHING: A LITTLE
WALKING IN HOSPITAL ROOM: A LITTLE
TOILETING: A LOT
TURNING FROM BACK TO SIDE WHILE IN FLAT BAD: A LITTLE
STANDING UP FROM CHAIR USING ARMS: A LITTLE
WALKING IN HOSPITAL ROOM: A LITTLE
PATIENT BASELINE BEDBOUND: NO
MOBILITY SCORE: 19
CLIMB 3 TO 5 STEPS WITH RAILING: A LOT
MOVING TO AND FROM BED TO CHAIR: A LITTLE
MOVING TO AND FROM BED TO CHAIR: A LITTLE
DRESSING REGULAR UPPER BODY CLOTHING: A LITTLE
HELP NEEDED FOR BATHING: A LOT
EATING MEALS: A LITTLE
DRESSING REGULAR LOWER BODY CLOTHING: A LOT
STANDING UP FROM CHAIR USING ARMS: A LITTLE

## 2025-08-04 ASSESSMENT — ACTIVITIES OF DAILY LIVING (ADL)
HEARING - LEFT EAR: FUNCTIONAL
FEEDING YOURSELF: INDEPENDENT
LACK_OF_TRANSPORTATION: PATIENT DECLINED
BATHING: INDEPENDENT
TOILETING: INDEPENDENT
DRESSING YOURSELF: INDEPENDENT
PATIENT'S MEMORY ADEQUATE TO SAFELY COMPLETE DAILY ACTIVITIES?: YES
HEARING - RIGHT EAR: FUNCTIONAL
ADEQUATE_TO_COMPLETE_ADL: YES
WALKS IN HOME: INDEPENDENT
JUDGMENT_ADEQUATE_SAFELY_COMPLETE_DAILY_ACTIVITIES: YES
GROOMING: INDEPENDENT
LACK_OF_TRANSPORTATION: PATIENT DECLINED

## 2025-08-04 ASSESSMENT — PAIN DESCRIPTION - LOCATION: LOCATION: HEAD

## 2025-08-04 ASSESSMENT — PATIENT HEALTH QUESTIONNAIRE - PHQ9
SUM OF ALL RESPONSES TO PHQ9 QUESTIONS 1 & 2: 0
1. LITTLE INTEREST OR PLEASURE IN DOING THINGS: NOT AT ALL
2. FEELING DOWN, DEPRESSED OR HOPELESS: NOT AT ALL

## 2025-08-04 ASSESSMENT — LIFESTYLE VARIABLES
SKIP TO QUESTIONS 9-10: 1
AUDIT-C TOTAL SCORE: 1
HOW OFTEN DO YOU HAVE 6 OR MORE DRINKS ON ONE OCCASION: NEVER
AUDIT-C TOTAL SCORE: 1
HOW OFTEN DO YOU HAVE A DRINK CONTAINING ALCOHOL: MONTHLY OR LESS
HOW MANY STANDARD DRINKS CONTAINING ALCOHOL DO YOU HAVE ON A TYPICAL DAY: 1 OR 2

## 2025-08-04 ASSESSMENT — PAIN SCALES - GENERAL
PAINLEVEL_OUTOF10: 6
PAINLEVEL_OUTOF10: 0 - NO PAIN
PAINLEVEL_OUTOF10: 5 - MODERATE PAIN
PAINLEVEL_OUTOF10: 0 - NO PAIN
PAINLEVEL_OUTOF10: 6

## 2025-08-04 ASSESSMENT — PAIN - FUNCTIONAL ASSESSMENT
PAIN_FUNCTIONAL_ASSESSMENT: 0-10

## 2025-08-04 ASSESSMENT — PAIN DESCRIPTION - DESCRIPTORS: DESCRIPTORS: ACHING

## 2025-08-04 NOTE — CARE PLAN
1145: Pt on floor from ED at this time.     EOS note: No acute changes this shift. Remains Aox4, room air, on tele- NSR/SB, x1 assist up with walker to chair/BR. Medicated with PO Tylenol for headache (see MAR) with good results. Continued Q4 neuro checks with no changes, right side remains weak/numb. Continued ACHS accu-checks with no coverage. Pt blind in left eye at baseline, eye is cloudy. Pt resting comfortably in chair with chair alarm on and call light within reach at this time.
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
## 2025-08-04 NOTE — PROGRESS NOTES
Physical Therapy    Physical Therapy    Physical Therapy Evaluation & Treatment    Patient Name: Ny Martinez  MRN: [id number]  Today's Date: 8/4/2025   Time Calculation  Start Time: 1258  Stop Time: 1329  Time Calculation (min): 31 min  3016/3016-A    Assessment/Plan   PT Assessment  PT Assessment Results: Decreased strength, Impaired balance  Rehab Prognosis: Good  Barriers to Discharge Home: Physical needs  Physical Needs: Stair navigation into home limited by function/safety, In-home setup navigation limited by function/safety, High falls risk due to function or environment  Evaluation/Treatment Tolerance: Patient tolerated treatment well  Strengths: Attitude of self, Premorbid level of function  End of Session Communication: Bedside nurse  Assessment Comment: Pt presents with decreased RUE/LE strength, impaired RUE coordination, and impaired balance, all of which impact her safety with functional mobility. Pt lives alone in a two story home with bed and full bath on second floor. Pt was previously IND and currently requires hands-on assist with all mobility. 5 time sit<>stand score was 23 seconds, indicating pt is at high risk for falls.  Would recommend high-intensity PT prior to returning home to maximize safety and IND.  End of Session Patient Position: Up in chair, Alarm on  IP OR SWING BED PT PLAN  Inpatient or Swing Bed: Inpatient  PT Plan  Treatment/Interventions: Bed mobility, Transfer training, Gait training, Stair training, Balance training, Neuromuscular re-education, Therapeutic exercise, Therapeutic activity  PT Plan: Ongoing PT  PT Frequency: 5 times per week  PT Discharge Recommendations: High intensity level of continued care  Equipment Recommended upon Discharge: Other (comment) (TBD)  PT Recommended Transfer Status: Assist x1  PT - OK to Discharge: Yes    Current Problem:  Problem List[1]    Subjective     General Visit Information:  General  Reason for Referral: stroke workup  Referred By:  Dr. Gaston  Past Medical History Relevant to Rehab: Pt admitted with numbness and weakness in RUE/RLE. CT and MRI negative for acute process, though did show white matter changes. TNK not given as time since last well was outside of parameters.PMH includes HTN, glaucoma, legally blind, NAFLD, ROHITH, cellulitis. Hospitalized earlier this year with chest pain due to myocardial ischemia.  Prior to Session Communication: Bedside nurse  Patient Position Received: Bed, 3 rail up, Alarm on  General Comment: RN cleared pt for eval; pt agreeable. Pt is legally blind but did not require much guidance during eval; states she can see objects and figures.    Home Living:  Home Living  Home Living Comments: Lives alone in two story home with full flight + HR to access. 2 HILARIO without HR.    Prior Level of Function:  Prior Function Per Pt/Caregiver Report  Prior Function Comments: IND AMB without device, does own a cane. Reports one fall in past 6 months. IND with ADL, cooking, shopping, cleaning. Takes public transportation for shopping. Has friends nearby who can assist as needed.    Precautions:  Precautions  Medical Precautions: Fall precautions    Vital Signs:     Objective     Pain:  Pain Assessment  Pain Assessment: 0-10  0-10 (Numeric) Pain Score: 6  Pain Location: Head (low back)  Pain Descriptors:  (LBP chronic; HA is improving since admission per pt)    Cognition:  Cognition  Overall Cognitive Status: Within Functional Limits  Orientation Level: Oriented X4    General Assessments:      Activity Tolerance  Endurance: Endurance does not limit participation in activity  Sensation  Light Touch: No apparent deficits (BLE intact to light touch sensation testing.)  Sensation Comment: Proprioception/kinesthesia appear intact.        Coordination  Movements are Fluid and Coordinated: No  Finger to Nose: Impaired (increased time and decreased accuracy RUE.)  Postural Control  Posture Comment: leans R in chair; pillow used to prop  RUE  Static Sitting Balance  Static Sitting-Level of Assistance: Distant supervision  Static Sitting-Comment/Number of Minutes: Good  Static Standing Balance  Static Standing-Level of Assistance: Contact guard  Static Standing-Comment/Number of Minutes: Fair-  Dynamic Standing Balance  Dynamic Standing-Level of Assistance:  (min with occ mod A)  Dynamic Standing-Balance: Turning  Dynamic Standing-Comments: Increased assistance during second half of gait trial    Functional Assessments:     Bed Mobility  Bed Mobility: Yes  Bed Mobility 1  Bed Mobility 1: Supine to sitting  Level of Assistance 1: Contact guard  Transfers  Transfer: Yes  Transfer 1  Technique 1: Sit to stand  Transfer Device 1: Gait belt  Transfer Level of Assistance 1: Minimum assistance  Trials/Comments 1: 6 trials  Ambulation/Gait Training  Ambulation/Gait Training Performed: Yes  Ambulation/Gait Training 1  Surface 1: Level tile  Device 1: No device  Gait Support Devices: Gait belt  Assistance 1: Minimum assistance  Quality of Gait 1: Decreased step length, Diminished heel strike  Comments/Distance (ft) 1: 3 feet bed to chair  Ambulation/Gait Training 2  Surface 2: Level tile  Device 2: No device  Gait Support Devices: Gait belt  Assistance 2: Minimum assistance  Quality of Gait 2: Diminished heel strike, Decreased step length (lack of terminal extension R knee noted more prominently during second half of gait trial. RUE held loosely at side.)  Comments/Distance (ft) 2: 15 feet          Extremity/Trunk Assessments:  RUE   RUE : Exceptions to WFL  RUE Strength  R Shoulder Flexion: 3+/5  R Elbow Flexion: 4-/5  R Elbow Extension: 3+/5  R Wrist Flexion: 3-/5  R Wrist Extension: 3-/5  RUE Tone  Tone Assessment: RUE slightly hypotonic though some rigidity was noted during MMT  LUE   LUE: Within Functional Limits  RLE   RLE : Exceptions to WFL  Strength RLE  R Hip Flexion: 3-/5  R Knee Extension: 4-/5  R Ankle Dorsiflexion: 4-/5  LLE   LLE : Within  Functional Limits    Treatments:           Bed Mobility  Bed Mobility: Yes  Bed Mobility 1  Bed Mobility 1: Supine to sitting  Level of Assistance 1: Contact guard  Ambulation/Gait Training  Ambulation/Gait Training Performed: Yes  Ambulation/Gait Training 1  Surface 1: Level tile  Device 1: No device  Gait Support Devices: Gait belt  Assistance 1: Minimum assistance  Quality of Gait 1: Decreased step length, Diminished heel strike  Comments/Distance (ft) 1: 3 feet bed to chair  Ambulation/Gait Training 2  Surface 2: Level tile  Device 2: No device  Gait Support Devices: Gait belt  Assistance 2: Minimum assistance  Quality of Gait 2: Diminished heel strike, Decreased step length (lack of terminal extension R knee noted more prominently during second half of gait trial. RUE held loosely at side.)  Comments/Distance (ft) 2: 15 feet  Transfers  Transfer: Yes  Transfer 1  Technique 1: Sit to stand  Transfer Device 1: Gait belt  Transfer Level of Assistance 1: Minimum assistance  Trials/Comments 1: 6 trials  5 time sit<>stand score of 23 seconds indicating high fall risk. Educated pt on safe transfer technique and noted deviations during standing and gait activities.       Outcome Measures:  [facility] Basic Mobility  Turning from your back to your side while in a flat bed without using bedrails: A little  Moving from lying on your back to sitting on the side of a flat bed without using bedrails: A little  Moving to and from bed to chair (including a wheelchair): A little  Standing up from a chair using your arms (e.g. wheelchair or bedside chair): A little  To walk in hospital room: A lot  Climbing 3-5 steps with railing: A lot  Basic Mobility - Total Score: 16                            Goals:  Encounter Problems       Encounter Problems (Active)       PT Problem       Strength       Start:  08/04/25    Expected End:  08/18/25       Pt will increase RLE strength to 4/5 to improve stance stability.           Balance        Start:  08/04/25    Expected End:  08/18/25       Pt will improve static/dynamic standing balance to Good in order to improve safety with functional tasks.           Bed mobility       Start:  08/04/25    Expected End:  08/18/25       Pt transfer sup<>sit with IND.           Transfers       Start:  08/04/25    Expected End:  08/18/25       Pt will transfer sit<>stand with mod I.           Gait       Start:  08/04/25    Expected End:  08/18/25       Pt will AMB 50 Feet with least restrictive device And mod I.           Stair negotiation       Start:  08/04/25    Expected End:  08/18/25       Pt will ascend/descend 12 steps with 1 handrail and least restrictive device.                Education Documentation  Body Mechanics, taught by Jessika Ventura PT at 8/4/2025  2:21 PM.  Learner: Patient  Readiness: Acceptance  Method: Explanation, Demonstration  Response: Verbalizes Understanding, Needs Reinforcement  Comment: Reinforced need to call EMS if stroke-like sx recur. Discussed potential DC to rehab, though pt prefers DC home.    Mobility Training, taught by Jessika Ventura PT at 8/4/2025  2:21 PM.  Learner: Patient  Readiness: Acceptance  Method: Explanation, Demonstration  Response: Verbalizes Understanding, Needs Reinforcement  Comment: Reinforced need to call EMS if stroke-like sx recur. Discussed potential DC to rehab, though pt prefers DC home.    Education Comments  No comments found.            [1]   Patient Active Problem List  Diagnosis    Abnormal echocardiogram    Aortic valve calcification    Arthritis of foot    Chronic pain of right knee    Corneal scarring    Eczema    Hypertension    Insulin resistance    Lumbar strain    NAFLD (nonalcoholic fatty liver disease)    Obstructive sleep apnea of adult    Prolactinoma, benign (Multi)    Steroid induced glaucoma, left eye    Vitamin D deficiency    Cellulitis    Osteoarthritis of knee    Corneal graft rejection    Cornea replaced by transplant    Chronic  midline low back pain without sciatica    Gait instability    Iron deficiency anemia secondary to inadequate dietary iron intake    Primary osteoarthritis of right hip    Acute ischemic stroke (Multi)

## 2025-08-04 NOTE — PROGRESS NOTES
Occupational Therapy  Evaluation    Patient Name: Ny Martinez  MRN: [id number]  Today's Date: 8/4/2025  Time Calculation  Start Time: 1445  Stop Time: 1503  Time Calculation (min): 18 min  3016/3016-A    Assessment  IP OT Assessment  OT Assessment: Patient demonstrates decreased independence with self care, decreased independence with functional transfers, right sided weakness, decreased balance, decreased strength and decreased endurance.  Patient below baseline level of function and lives alone.  Patient will benefit from skilled OT to address deficits. Anticipate patient will benefit from skilled OT to address deficits.  Patient will benefit from high intensity therapy post hospital stay to maximize functional independence.  Prognosis: Good  Barriers to Discharge Home: Physical needs, Caregiver assistance  Caregiver Assistance: Patient lives alone and/or does not have reliable caregiver assistance  Physical Needs: Intermittent mobility assistance needed, Intermittent ADL assistance needed, High falls risk due to function or environment, Stair navigation to access bed limited by function/safety, Stair navigation to access bath limited by function/safety  Evaluation/Treatment Tolerance: Patient limited by fatigue  Medical Staff Made Aware: Yes  End of Session Communication: Bedside nurse  End of Session Patient Position: Up in chair, Alarm on    Plan:  Treatment Interventions: ADL retraining, Functional transfer training, UE strengthening/ROM, Endurance training, Patient/family training, Fine motor coordination activities  OT Frequency: 5 times per week  OT Discharge Recommendations: High intensity level of continued care  Equipment Recommended upon Discharge:  (TBD)  OT Recommended Transfer Status: Minimal assist  OT - OK to Discharge: Yes (to next level of care when medically cleared by physician)    Subjective     Current Problem:  1. Acute ischemic stroke (Multi)  Transthoracic Echo (TTE) Limited     Transthoracic Echo (TTE) Limited      2. SOB (shortness of breath)  Transthoracic Echo (TTE) Limited    Transthoracic Echo (TTE) Limited      3. Cerebrovascular accident (CVA), unspecified mechanism (Multi)            General:  General  Reason for Referral: stroke work up  Referred By: Dr. Gaston  Past Medical History Relevant to Rehab: Pt admitted with numbness and weakness in RUE/RLE. CT and MRI negative for acute process, though did show white matter changes. TNK not given as time since last well was outside of parameters.PMH includes HTN, glaucoma, legally blind, NAFLD, ROHITH, cellulitis. Hospitalized earlier this year with chest pain due to myocardial ischemia.  Prior to Session Communication: Bedside nurse  Patient Position Received: Up in chair, Alarm on  Preferred Learning Style: verbal, visual  General Comment: Patient agreeable to therapy evaluation. Patient sleepy.    Precautions:  Medical Precautions: Fall precautions      Pain:  Pain Assessment  Pain Assessment: 0-10  0-10 (Numeric) Pain Score: 0 - No pain    Objective     Cognition:  Overall Cognitive Status: Within Functional Limits  Orientation Level: Oriented X4            Home Living:  Type of Home: House  Lives With: Alone  Home Layout: Two level, Bed/bath upstairs  Home Access: Stairs to enter with rails  Entrance Stairs-Number of Steps: 2     Prior Function:  Level of [place]: Independent with ADLs and functional transfers, Independent with homemaking with ambulation  Prior Function Comments: IND AMB without device, does own a cane. Reports one fall in past 6 months. IND with ADL, cooking, shopping, cleaning. Takes public transportation for shopping. Has friends nearby who can assist as needed.      ADL:  LE Dressing Assistance: Moderate  Toileting Assistance with Device: Maximum    Activity Tolerance:  Endurance: Tolerates 10 - 20 min exercise with multiple rests  Activity Tolerance Comments: Patient sleepy during session.    Bed  Mobility/Transfers:   Bed Mobility  Bed Mobility: No (sitting up in chair upon arrival)  Transfers  Transfer: Yes  Transfer 1  Transfer From 1: Sit to  Transfer to 1: Stand  Technique 1: Sit to stand, Stand to sit  Transfer Device 1: Gait belt  Transfer Level of Assistance 1: Minimum assistance  Trials/Comments 1: from chair x 2 trials    Ambulation/Gait Training:  Functional Mobility  Functional Mobility Performed: Yes (Patient ambulated short distance in room with no AD with min A.  Patient attempting to reach for furniture when ambulating.)      Vision:     and Vision - Complex Assessment  Vision Comments: legally blind in left eye    Sensation:  Light Touch: No apparent deficits    Strength:  Strength Comments: right UE weaker than left    Coordination:  Finger to Nose: Impaired (right UE)     Hand Function:  Hand Function  Gross Grasp: Impaired (right hand)  Coordination: Impaired (right UE)    Extremities: RUE   RUE : Exceptions to WFL  RUE Strength  R Shoulder Flexion: 3-/5  R Elbow Flexion: 4-/5  R Elbow Extension: 3+/5  R Wrist Flexion: 3-/5  R Wrist Extension: 3-/5 and LUE   LUE: Within Functional Limits    Outcome Measures: [facility] Daily Activity  Putting on and taking off regular lower body clothing: A lot  Bathing (including washing, rinsing, drying): A lot  Putting on and taking off regular upper body clothing: A little  Toileting, which includes using toilet, bedpan or urinal: A lot  Taking care of personal grooming such as brushing teeth: A little  Eating Meals: A little  Daily Activity - Total Score: 15           EDUCATION:  Education  Individual(s) Educated: Patient  Education Provided: Fall precautons, Risk and benefits of OT discussed with patient or other  Plan of Care Discussed and Agreed Upon: yes  Patient Response to Education: Patient/Caregiver Verbalized Understanding of Information      Goals:   Encounter Problems       Encounter Problems (Active)       OT Goals       Patient will complete  functional transfers with mod I. (Progressing)       Start:  08/04/25    Expected End:  08/18/25            Patient will complete toileting with mod I. (Progressing)       Start:  08/04/25    Expected End:  08/18/25            Patient will complete UE therapeutic exercises with independence for 3 sets of 10 each. (Progressing)       Start:  08/04/25    Expected End:  08/18/25            Patient will complete LE dressing with mod I. (Progressing)       Start:  08/04/25    Expected End:  08/18/25

## 2025-08-04 NOTE — CONSULTS
"Inpatient consult to Neuro TeleStroke  Consult performed by: Balbina Zavala MD  Consult ordered by: Nirav Valdez DO        Virtual Visit start time: 12:13 AM    History Of Present Illness:  Historian: Patient  Ny Martinez is a 65 y.o. female presenting with right sided weakness.    She had sudden onset right let and arm weakness. She noticed she was having difficulty moving her hand. She had trouble moving the right hand and could not control it. She also has reduced sensation in the right arm and leg.     Last known well: 11am  Had stroke symptoms resolved at time of presentation: No   Current antiplatelet/anticoagulant use: Aspirin    Prior Functional Status (Modified [place] Scale):  0 The patient has no residual symptoms.    Stroke Risk Factors:  Hypertension and Sleep Apnea    Last Recorded Vitals:  Blood pressure (!) 181/118, pulse 67, temperature 36.3 °C (97.3 °F), resp. rate 16, height 1.626 m (5' 4\"), weight 81.6 kg (180 lb), SpO2 100%.    Physical Exam:       NIHSS:   NIH Stroke Scale:     1A. Level of Consciousness:  Alert (keenly responsive) (0)    1B. Ask Month and Age:  Both questions right (0)    1C. Blink Eyes & Squeeze Hands:  Performs both tasks (0)    2. Best Gaze:  Normal (0)    3. Visual:  No visual loss (0)    4. Facial Palsy:  Normal symmetry (0)    5A. Motor - Left Arm:  No drift (0)    5B. Motor - Right Arm:  Drift (+1)    6A. Motor - Left Leg:  No drift (0)    6B. Motor - Right Leg:  Some effort against gravity (+2)    7. Limb Ataxia:  No ataxia (0)    8. Sensory Loss:  Mild-moderate loss (+1)    9. Best Language:  Normal (no aphasia) (0)    10. Dysarthia:  Normal (0)    11. Extinction and Inattention:  No abnormality (0)    NIH Stroke Scale:  4           Relevant Results:  LABS:  No results found for: \"GLUCOSE\", \"INR\"   Results for orders placed or performed during the hospital encounter of 08/03/25 (from the past 24 hours)   POCT GLUCOSE   Result Value Ref Range    POCT Glucose " 101 (H) 74 - 99 mg/dL        CT Head Imaging:  CTH imaging personally reviewed, showed no acute ischemic / hemorrhagic changes     CTA Head and Neck Imaging:  CTA imaging not performed   Hx anaphylactic allergy to contrast dye   CT Perfusion Imaging:      Diagnosis:  Supect Acute Ischemic Stroke    Assessment/Plan:      IV Thrombolysis IV Thrombolysis Checklist        IV Thrombolysis Given: No; Thrombolysis contraindication reason: Time from Last Known Well (or stroke onset) is >4.5 hours           Patient is a candidate for thrombectomy:  yes/no: No; contraindication reason: NIHSS <6    Additional Recommendations:  MRI Brain w/o contrast stroke protocol or repeat CTH 24 hours after initial CTH if unable to get MRI.  MRA head and neck to evaluate for cerebral vasculature (has anaphylactic allergy to iodinated contrast dye)  EKG, troponin.  TTE w/ bubble study.  Please obtain extended cardiac rhythm monitoring with Holter to rule out paroxysmal A fib.  If Holter is negative, consider implantable loop recorder.    Lipid panel, A1c.  ASA 81mg daily  Lipid Goals: education on healthy diet and statin therapy to maintain or achieve goal LDL-cholesterol < 70mg. Atorvastatin 40mg..  Blood pressure goals: avoid hypotension SBP <100 that could worsen cerebral perfusion. Ischemic stroke- early permissive hypertension SBP < 220 mmHg with cautious inpatient lowering..  Glucose Goals: early treatment of hyperglycemia to goal glucose 140-180 mg/dl with long-term goal A1c < 7%.  NPO until nurse bedside swallow assessment.  Consider focused stroke order set.  Smoking Cessation and Education.  Assessment for Rehabilitation needs.  Patient and family education on signs and symptoms of stroke, calling 911, healthy strategies for stroke prevention.        Disposition:  Patient will remain at referring facility for further evaluation and management.    Virtual or Telephone Consent    An interactive audio and video telecommunication  system which permits real time communications between the patient (at the originating site) and provider (at the distant site) was utilized to provide this telehealth service.   Verbal consent was requested and obtained from Ny Martinez on this date, 08/04/25 for a telehealth visit.      Telestroke is covered in shift work. If there are further Neurological questions or concerns please contact your regional neurologist on call during daytime hours or contact the transfer center with an ADT20 order.    Balbina Zavala MD

## 2025-08-04 NOTE — ED PROVIDER NOTES
History/Exam limitations: none.   Additional history was obtained from patient.    HPI:       Ny Martinez is a 65 y.o. with a history of HTN, NAFLD, and legal blindness presenting to the ED with R sided weakness that started at 1100.  States that she suddenly felt weakness in her R arm and was unable to open her R hand from a closed fist. States that she couldn't lift her hand either.  Over time, that weakness has improved some, but has not completely resolved. She also had some R leg numbness that occurred right before her R arm weakness. That has since improved, but she still endorses a difference in sensation in both her R arm and R leg compared to the other.        Additionally, when she arrived to the ED and then checked in she went to the restroom for about 20 min. Triage tried to get her to go a room in the ED to be evaluated by a provider before using the restroom, but she wanted to go to the bathroom first.     Of note there was a coronary artery bypass listed from 2013 in her medical history, but she denies ever having that done and states that it was a heart cath. She does not have any known heart disease.     She is also in need of a referral for primary care as she does not currently have a provider. She was previously prescribed hydrochlorothiazide for her HTN but states that she does not take it everyday.   Last Known Well Time: 1100         ------------------------------------------------------------------------------------------------------------------------------------------     Physical Exam:    ED Triage Vitals [08/03/25 2341]   Temperature Heart Rate Respirations BP   36.3 °C (97.3 °F) 67 16 (!) 181/118      Pulse Ox Temp src Heart Rate Source Patient Position   100 % -- -- --      BP Location FiO2 (%)     -- --          Gen: Not in acute distress  Head/Neck: NCAT  Eyes: Anicteric sclerae, noninjected conjunctivae  Nose: No rhinorrhea  Mouth:  MMM  Heart: Regular rate and rhythm w/ no  murmurs, rubs, gallops  Lungs: CTAB w/o wheezes, rales, rhonchi, no increased work of breathing  Abdomen: Soft, NT/ND  Musculoskeletal: No deformities  Neurologic: Please see below for NIHSS  Skin: No rashes noted  Psychological: Calm        Interval: Baseline  Time: 1:30 AM  Person Administering Scale: Stefani LoPresti, DO     1a  Level of consciousness: 0=alert; keenly responsive   1b. LOC questions:  0=Performs both tasks correctly   1c. LOC commands: 0=Performs both tasks correctly   2.  Best Gaze: 0=normal   3. Visual: 0=No visual loss   4. Facial Palsy: 0=Normal symmetric movement   5a. Motor left arm: 0=No drift, limb holds 90 (or 45) degrees for full 10 seconds   5b.  Motor right arm: 1=Drift, limb holds 90 (or 45) degrees but drifts down before full 10 seconds: does not hit bed   6a. motor left le=No drift, limb holds 90 (or 45) degrees for full 10 seconds   6b  Motor right le=Some effort against gravity, limb cannot get to or maintain (if cured) 90 (or 45) degrees, drifts down to bed, but has some effort against gravity   7. Limb Ataxia: 1=Present in one limb   8.  Sensory: 1=Mild to moderate sensory loss; patient feels pinprick is less sharp or is dull on the affected side; there is a loss of superficial pain with pinprick but patient is aware She is being touched   9. Best Language:  0=No aphasia, normal   10. Dysarthria: 0=Normal   11. Extinction and Inattention: 0=No abnormality   12. Distal motor function: 0=Normal     Total:   5         VAN: VAN: Negative     ------------------------------------------------------------------------------------------------------------------------------------------     Medical Decision Making:     ED Course as of 25 1307   Mon Aug 04, 2025   0003 Patient was seen and evaluated.  65-year-old female presenting to the emergency department.  She has been having weakness in the right arm and leg and clumsiness in the right arm that is been ongoing since 11 AM  this morning.    Brain attack was called when patient was brought back to the room from triage.    On arrival she is hypertensive 181/118 otherwise afebrile and hemodynamically stable.  On exam she has got slight drift in the right arm but significant ataxia in the right arm.  She does have drift in the right leg but does hit the bed.    She is not tenecteplase candidate as she is outside the window for thrombolytics.  She is sent over for CT of the head.  She will require admission for stroke workup however. [JJ]   0008 Discussed case with neurology, Dr. Zavala.  She is going to review the CT imaging and then reach back out to us.  Agrees that patient is not a thrombolytic candidate.  Given the patient's history of anaphylaxis to IV contrast will not get CTA at this time. [JJ]   0028 Dr. Zavala evaluated the patient in telestroke conference.  She recommends admission for further stroke workup.  Recommends MRI of the brain, MRA of the head and neck and echocardiogram. [JJ]   0030 CT brain attack head wo IV contrast  No acute intracranial hemorrhage or mass effect. Mild nonspecific  white matter changes. MRI may be considered as clinically indicated  for further evaluation of acute ischemic infarct.       [SL]   0044 POCT GLUCOSE(!)  Glucose is within normal limits, hypo or hyperglycemia are unlikely to be the cause of her symptoms [SL]   0119 CBC and Auto Differential(!) [SL]   0119 CBC and Auto Differential(!)  WBC  [SL]   0124 Spoke with Dr. Verdin who has accepted the patient for admission [SL]      ED Course User Index  [JJ] Nirav Valdez DO  [SL] Stefani Lopresti, DO         Diagnoses as of 08/04/25 1307   Cerebrovascular accident (CVA), unspecified mechanism (Multi)        EKG interpreted by myself: Rate 59, regular rhythm, left axis deviation, no STEMI     Independent Interpretation of Studies: I independently interpreted the CT head and see No obvious evidence of intracranial hemorrhage and No obvious  evidence of skull fracture    Chronic Medical Conditions Significantly Affecting Care: legal blindness    Social Determinants of Health Significantly Affecting Care: none identified     External Records Reviewed: I reviewed recent and relevant outside records including: Prior ED notes as well as hospital admission. Had previously been hospitalized for cellulitis.      Discussion of Management with Other Providers:   I discussed the patient/results with: Dr. Zavala from neurology and the admitting team      IV Thrombolysis:    No Thrombolysis contraindication reason: Time from Last Known Well (or stroke onset) is >4.5 hours     Procedure  Critical Care    Performed by: Nirav Valdez DO  Authorized by: Nirav Valdez DO    Critical care provider statement:     Critical care time (minutes):  40    Critical care time was exclusive of:  Separately billable procedures and treating other patients and teaching time    Critical care was necessary to treat or prevent imminent or life-threatening deterioration of the following conditions:  CNS failure or compromise    Critical care was time spent personally by me on the following activities:  Development of treatment plan with patient or surrogate, discussions with consultants, discussions with primary provider, evaluation of patient's response to treatment, examination of patient, ordering and performing treatments and interventions, ordering and review of laboratory studies, ordering and review of radiographic studies and re-evaluation of patient's condition            Stefani Lopresti, DO  Resident  08/04/25 0130    I was present for the entirety of the procedure(s).     The patient was seen by the resident/fellow.  I have personally performed a substantive portion of the encounter.  I have seen and examined the patient; agree with the workup, evaluation, MDM, management and diagnosis.  The care plan has been discussed with the resident; I have reviewed the resident’s  note and agree with the documented findings.                                                                           Nirav Valdez, DO  08/04/25 2722

## 2025-08-04 NOTE — HOSPITAL COURSE
Ny Martinez  is a 65 y.o. female with PMHx of HTN, glaucoma, and legal blindness presented to the ED with a chief complaint of right-sided numbness and weakness. In the ED she received a head CT which showed no acute intercranial hemorrhage or mass effect. She was admitted with concerns of stroke. While in the hospital she underwent MRI of the head and neck which showed no evidence of stroke. She initially presented with notable weakness and difficulty controlling her right extremities. She is now able to walk and move her right arm, but still has some weakness. Neurology was consulted and they recommended Asprin on discharge and Holter monitor with a 4 week follow up out patient. She will be discharged home.

## 2025-08-04 NOTE — PROGRESS NOTES
"Ny Martinez is a 65 y.o. female on day 0 of admission presenting with Acute ischemic stroke (Multi).    Patient is a 65-year-old female evaluated via Telestroke by Dr. Balbina Zavala on 08/04/2025 for acute right-sided weakness. Symptoms began around 11:00 AM with sudden onset right arm and leg weakness and reduced sensation. At the time of evaluation, symptoms were still present. She was previously fully independent (mRS = 0) and takes aspirin. Stroke risk factors include hypertension and sleep apnea.    On examination, she was alert and oriented with NIHSS score of 4 due to right arm drift, right leg weakness, and mild-moderate right sensory loss. CT head was negative for acute ischemic or hemorrhagic changes. CTA head and neck was not performed due to a documented anaphylactic allergy to contrast dye. No CT perfusion was done. Glucose was 101 mg/dL; INR was not available.    Patient was not a candidate for IV thrombolysis as she was last known well over 4.5 hours prior to presentation. Not a thrombectomy candidate due to NIHSS < 6.    Dr. Zavala recommended stroke work up ( imaging, and lab work up)     Continue aspirin 81 mg daily and Atorvastatin 40 mg daily with LDL goal < 70 mg/dL    Maintain permissive hypertension (SBP < 220 mmHg) while inpatient, avoid SBP < 100    NPO until bedside swallow evaluation    Smoking cessation counseling      Subjective   She has been doing better, right sided weakness improved but didn't resolve.          Objective     Last Recorded Vitals  Blood pressure (!) 179/92, pulse (!) 47, temperature 36.3 °C (97.3 °F), resp. rate 16, height 1.626 m (5' 4\"), weight 81.6 kg (180 lb), SpO2 99%.    Physical Exam  Neurological Exam  Relevant Results    GENERAL APPEARANCE:  No distress, alert and cooperative.     CARDIOVASCULAR: Regular, rate and rhythm, without murmur. No carotid bruits. Pulses +2 and equal in all extremities. No edema, or tenderness to palpation.    MENTAL STATE: "  Orientation was normal to time, place and person. Recent and remote memory was intact.  Attention span and concentration were normal. Language testing was normal for comprehension, repetition, expression, and naming. Calculation was intact. The patient could correctly interpret a picture, and copy a diagram. General fund of knowledge was intact. Mini-mental status examination was performed with no errors.     OPHTHALMOSCOPIC: The ophthalmoscopic exam normal. The fundi were well visualized with normal disc margins, clear vessels and vascular pulsations. No disc edema. The cup/disk ratio was not enlarged. No hemorrhages or exudates were present in the posterior segments that were visualized.     CRANIAL NERVES:  Cranial nerves were normal.      CN 2-  left corneal opacity ( keratitis), right keratoconus.       CN 3, 4, 6-  Pupils round, 4 mm in diameter, equally reactive to light. No ptosis. EOMs normal alignment, full range of movement, no nystagmus     CN 5- Facial sensation intact bilaterally. Normal corneal reflexes.      CN 7- Normal and symmetric facial strength. Nasolabial folds symmetric.     CN 8- Hearing intact to finger rub, whisper.      CN 9- Palate elevates symmetrically. Normal gag reflex.      CN 11- Normal strength of shoulder shrug and neck turning      CN 12- Tongue midline, with normal bulk and strength; no fasciculations.     MOTOR:  Mild right sided weakness     REFLEXES:  Right/ Left:  Biceps 2/2, brachioradialis 2/2, triceps 2/2, patellar 2/2, ankle 2/2  Babinski: toes downgoing to plantar stimulation. No clonus, frontal release signs or other pathologic reflexes present.     SENSORY: Sensory exam was intact to light touch, sharp/dull, vibration and position sense in both UE and LE.     COORDINATION: JOYCE were intact in upper and lower extremities.  In UE- finger-nose-finger was intact and in LE- heel-to-shin was intact without dysmetria or overshoot.      GAIT:  not tested.           NIH  Stroke Scale  1A. Level of Consciousness: Alert, Keenly Responsive  1B. Ask Month and Age: Both Questions Right  1C. Blink Eyes & Squeeze Hands: Performs Both Tasks  2. Best Gaze: Normal  3. Visual: No Visual Loss  4. Facial Palsy: Normal Symmetrical Movements  5A. Motor - Left Arm: No Drift  5B. Motor - Right Arm: Drift  6A. Motor - Left Leg: No Drift  6B. Motor - Right Leg: Drift  7. Limb Ataxia: Present in One Limb  8. Sensory Loss: Mild-to-Moderate Sensory Loss  9. Best Language: No Aphasia  10. Dysarthria: Normal  11. Extinction and Inattention: No Abnormality  NIH Stroke Scale: 4           Sae Coma Scale  Best Eye Response: Spontaneous  Best Verbal Response: Oriented  Best Motor Response: Follows commands  Sae Coma Scale Score: 15                MRI, MRA brain and neck:   1. No evidence of acute infarct, intracranial hemorrhage, or mass effect.  2. Within the limitations of the study there is no large vessel cut off or significant stenosis on MRA head and neck.  3. Nonspecific white matter changes which can be seen in the setting of chronic microangiopathy.    Echocardiogram:   1. Left ventricular ejection fraction is normal calculated by Cox's biplane at 64%.   2. Spectral Doppler shows a normal pattern of left ventricular diastolic filling.   3. There is normal right ventricular global systolic function.   4. The Doppler estimated RVSP is within normal limits at 26 mmHg.    I have personally reviewed the patient's lab work and results for the last year:  Admission on 08/03/2025   Component Date Value Ref Range Status    POCT Glucose 08/03/2025 101 (H)  74 - 99 mg/dL Final    WBC 08/04/2025 3.8 (L)  4.4 - 11.3 x10*3/uL Final    nRBC 08/04/2025 0.0  0.0 - 0.0 /100 WBCs Final    RBC 08/04/2025 3.01 (L)  4.00 - 5.20 x10*6/uL Final    Hemoglobin 08/04/2025 9.4 (L)  12.0 - 16.0 g/dL Final    Hematocrit 08/04/2025 29.0 (L)  36.0 - 46.0 % Final    MCV 08/04/2025 96  80 - 100 fL Final    MCH 08/04/2025  31.2  26.0 - 34.0 pg Final    MCHC 08/04/2025 32.4  32.0 - 36.0 g/dL Final    RDW 08/04/2025 12.6  11.5 - 14.5 % Final    Platelets 08/04/2025 202  150 - 450 x10*3/uL Final    Neutrophils % 08/04/2025 54.0  40.0 - 80.0 % Final    Immature Granulocytes %, Automated 08/04/2025 0.0  0.0 - 0.9 % Final    Immature Granulocyte Count (IG) includes promyelocytes, myelocytes and metamyelocytes but does not include bands. Percent differential counts (%) should be interpreted in the context of the absolute cell counts (cells/UL).    Lymphocytes % 08/04/2025 31.9  13.0 - 44.0 % Final    Monocytes % 08/04/2025 10.2  2.0 - 10.0 % Final    Eosinophils % 08/04/2025 3.1  0.0 - 6.0 % Final    Basophils % 08/04/2025 0.8  0.0 - 2.0 % Final    Neutrophils Absolute 08/04/2025 2.07  1.20 - 7.70 x10*3/uL Final    Percent differential counts (%) should be interpreted in the context of the absolute cell counts (cells/uL).    Immature Granulocytes Absolute, Au* 08/04/2025 0.00  0.00 - 0.70 x10*3/uL Final    Lymphocytes Absolute 08/04/2025 1.22  1.20 - 4.80 x10*3/uL Final    Monocytes Absolute 08/04/2025 0.39  0.10 - 1.00 x10*3/uL Final    Eosinophils Absolute 08/04/2025 0.12  0.00 - 0.70 x10*3/uL Final    Basophils Absolute 08/04/2025 0.03  0.00 - 0.10 x10*3/uL Final    Glucose 08/04/2025 103 (H)  74 - 99 mg/dL Final    Sodium 08/04/2025 141  136 - 145 mmol/L Final    Potassium 08/04/2025 4.0  3.5 - 5.3 mmol/L Final    Chloride 08/04/2025 104  98 - 107 mmol/L Final    Bicarbonate 08/04/2025 30  21 - 32 mmol/L Final    Anion Gap 08/04/2025 11  10 - 20 mmol/L Final    Urea Nitrogen 08/04/2025 14  6 - 23 mg/dL Final    Creatinine 08/04/2025 0.66  0.50 - 1.05 mg/dL Final    eGFR 08/04/2025 >90  >60 mL/min/1.73m*2 Final    Calculations of estimated GFR are performed using the 2021 CKD-EPI Study Refit equation without the race variable for the IDMS-Traceable creatinine methods.  https://jasn.asnjournals.org/content/early/2021/09/22/ASN.4539689651     Calcium 08/04/2025 8.5 (L)  8.6 - 10.3 mg/dL Final    Albumin 08/04/2025 3.7  3.4 - 5.0 g/dL Final    Alkaline Phosphatase 08/04/2025 54  33 - 136 U/L Final    Total Protein 08/04/2025 6.9  6.4 - 8.2 g/dL Final    AST 08/04/2025 15  9 - 39 U/L Final    Bilirubin, Total 08/04/2025 1.0  0.0 - 1.2 mg/dL Final    ALT 08/04/2025 11  7 - 45 U/L Final    Patients treated with Sulfasalazine may generate falsely decreased results for ALT.    Troponin I, High Sensitivity 08/04/2025 4  0 - 13 ng/L Final    Protime 08/04/2025 11.8  9.8 - 12.4 seconds Final    INR 08/04/2025 1.1  0.9 - 1.1 Final    aPTT 08/04/2025 31  26 - 36 seconds Final    Ventricular Rate 08/04/2025 59  BPM Preliminary    Atrial Rate 08/04/2025 59  BPM Preliminary    KS Interval 08/04/2025 176  ms Preliminary    QRS Duration 08/04/2025 82  ms Preliminary    QT Interval 08/04/2025 426  ms Preliminary    QTC Calculation(Bazett) 08/04/2025 421  ms Preliminary    P Axis 08/04/2025 74  degrees Preliminary    R Axis 08/04/2025 -48  degrees Preliminary    T Aurora 08/04/2025 48  degrees Preliminary    QRS Count 08/04/2025 10  beats Preliminary    Q Onset 08/04/2025 216  ms Preliminary    P Onset 08/04/2025 128  ms Preliminary    P Offset 08/04/2025 185  ms Preliminary    T Offset 08/04/2025 429  ms Preliminary    QTC Fredericia 08/04/2025 423  ms Preliminary    Extra Tube 08/04/2025 Hold for add-ons.   Final    Auto resulted.    Cholesterol 08/04/2025 137  0 - 199 mg/dL Final          Age      Desirable   Borderline High   High     0-19 Y     0 - 169       170 - 199     >/= 200    20-24 Y     0 - 189       190 - 224     >/= 225         >24 Y     0 - 199       200 - 239     >/= 240   **All ranges are based on fasting samples. Specific   therapeutic targets will vary based on patient-specific   cardiac risk.    Pediatric guidelines reference:Pediatrics 2011, 128(S5).Adult guidelines reference: NCEP ATPIII Guidelines,LM 2001, 258:2486-97    Venipuncture immediately  after or during the administration of Metamizole may lead to falsely low results. Testing should be performed immediately prior to Metamizole dosing.    HDL-Cholesterol 08/04/2025 84.4  mg/dL Final      Age       Very Low   Low     Normal    High    0-19 Y    < 35      < 40     40-45     ----  20-24 Y    ----     < 40      >45      ----        >24 Y      ----     < 40     40-60      >60      Cholesterol/HDL Ratio 08/04/2025 1.6   Final      Ref Values  Desirable  < 3.4  High Risk  > 5.0    LDL Calculated 08/04/2025 44  <=99 mg/dL Final                                Near   Borderline      AGE      Desirable  Optimal    High     High     Very High     0-19 Y     0 - 109     ---    110-129   >/= 130     ----    20-24 Y     0 - 119     ---    120-159   >/= 160     ----      >24 Y     0 -  99   100-129  130-159   160-189     >/=190    LDL Cholesterol is calculated using the Friedewald equation.    VLDL 08/04/2025 8  0 - 40 mg/dL Final    Triglycerides 08/04/2025 41  0 - 149 mg/dL Final    Age              Desirable        Borderline         High        Very High  SEX:B           mg/dL             mg/dL               mg/dL      mg/dL  <=14D                       ----               ----        ----  15D-365D                    ----               ----        ----  1Y-9Y           0-74               75-99             >=100       ----  10Y-19Y        0-89                            >=130       ----  20Y-24Y        0-114             115-149             >=150      ----  >= 25Y         0-149             150-199             200-499    >=500      Venipuncture immediately after or during the administration of Metamizole may lead to falsely low results. Testing should be performed immediately prior to Metamizole dosing.    Non HDL Cholesterol 08/04/2025 53  0 - 149 mg/dL Final          Age       Desirable   Borderline High   High     Very High     0-19 Y     0 - 119       120 - 144     >/= 145    >/= 160    20-24  Y     0 - 149       150 - 189     >/= 190      ----         >24 Y    30 mg/dL above LDL Cholesterol goal      Hemoglobin A1C 08/04/2025 4.6  See comment % Final    Estimated Average Glucose 08/04/2025 85  Not Established mg/dL Final    BNP 08/04/2025 75  0 - 99 pg/mL Final    Glucose 08/04/2025 87  74 - 99 mg/dL Final    Sodium 08/04/2025 140  136 - 145 mmol/L Final    Potassium 08/04/2025 3.9  3.5 - 5.3 mmol/L Final    Chloride 08/04/2025 103  98 - 107 mmol/L Final    Bicarbonate 08/04/2025 30  21 - 32 mmol/L Final    Anion Gap 08/04/2025 11  10 - 20 mmol/L Final    Urea Nitrogen 08/04/2025 11  6 - 23 mg/dL Final    Creatinine 08/04/2025 0.53  0.50 - 1.05 mg/dL Final    eGFR 08/04/2025 >90  >60 mL/min/1.73m*2 Final    Calculations of estimated GFR are performed using the 2021 CKD-EPI Study Refit equation without the race variable for the IDMS-Traceable creatinine methods.  https://jasn.asnjournals.org/content/early/2021/09/22/ASN.6134475600    Calcium 08/04/2025 8.9  8.6 - 10.3 mg/dL Final    Phosphorus 08/04/2025 3.7  2.5 - 4.9 mg/dL Final    Albumin 08/04/2025 4.2  3.4 - 5.0 g/dL Final    Magnesium 08/04/2025 2.14  1.60 - 2.40 mg/dL Final    WBC 08/04/2025 4.1 (L)  4.4 - 11.3 x10*3/uL Final    nRBC 08/04/2025 0.0  0.0 - 0.0 /100 WBCs Final    RBC 08/04/2025 3.45 (L)  4.00 - 5.20 x10*6/uL Final    Hemoglobin 08/04/2025 10.9 (L)  12.0 - 16.0 g/dL Final    Hematocrit 08/04/2025 33.6 (L)  36.0 - 46.0 % Final    MCV 08/04/2025 97  80 - 100 fL Final    MCH 08/04/2025 31.6  26.0 - 34.0 pg Final    MCHC 08/04/2025 32.4  32.0 - 36.0 g/dL Final    RDW 08/04/2025 12.7  11.5 - 14.5 % Final    Platelets 08/04/2025 243  150 - 450 x10*3/uL Final    Retic % 08/04/2025 2.7 (H)  0.5 - 2.0 % Final    Retic Absolute 08/04/2025 0.081  0.018 - 0.083 x10*6/uL Final    Reticulocyte Hemoglobin 08/04/2025 34  28 - 38 pg Final    Immature Retic fraction 08/04/2025 7.1  <=16.0 % Final    Reticulocytes are measured based on a fluorescent  "technique. The IRF, or immature reticulocyte fraction, is the percent of reticulocytes that show medium (MFR) or high (HFR) fluorescence.  This value can be used to assess the relative maturity of the reticulocyte population in response to anemia. The \"shift reticulocytes\" are not measured by this technique, eliminating the need for their correction in the reticulocyte index.    Iron 08/04/2025 53  35 - 150 ug/dL Final    UIBC 08/04/2025 179  110 - 370 ug/dL Final    TIBC 08/04/2025 232 (L)  240 - 445 ug/dL Final    % Saturation 08/04/2025 23 (L)  25 - 45 % Final    Ferritin 08/04/2025 65  8 - 150 ng/mL Final   Admission on 07/21/2025, Discharged on 07/21/2025   Component Date Value Ref Range Status    Color, Urine 07/21/2025 Colorless (N)  Light-Yellow, Yellow, Dark-Yellow Final    Appearance, Urine 07/21/2025 Clear  Clear Final    Specific Gravity, Urine 07/21/2025 1.006  1.005 - 1.035 Final    pH, Urine 07/21/2025 6.5  5.0, 5.5, 6.0, 6.5, 7.0, 7.5, 8.0 Final    Protein, Urine 07/21/2025 NEGATIVE  NEGATIVE, 10 (TRACE), 20 (TRACE) mg/dL Final    Glucose, Urine 07/21/2025 Normal  Normal mg/dL Final    Blood, Urine 07/21/2025 NEGATIVE  NEGATIVE mg/dL Final    Ketones, Urine 07/21/2025 NEGATIVE  NEGATIVE mg/dL Final    Bilirubin, Urine 07/21/2025 NEGATIVE  NEGATIVE mg/dL Final    Urobilinogen, Urine 07/21/2025 Normal  Normal mg/dL Final    Nitrite, Urine 07/21/2025 NEGATIVE  NEGATIVE Final    Leukocyte Esterase, Urine 07/21/2025 NEGATIVE  NEGATIVE Final    WBC 07/21/2025 4.9  4.4 - 11.3 x10*3/uL Final    nRBC 07/21/2025 0.0  0.0 - 0.0 /100 WBCs Final    RBC 07/21/2025 3.02 (L)  4.00 - 5.20 x10*6/uL Final    Hemoglobin 07/21/2025 9.2 (L)  12.0 - 16.0 g/dL Final    Hematocrit 07/21/2025 27.7 (L)  36.0 - 46.0 % Final    MCV 07/21/2025 92  80 - 100 fL Final    MCH 07/21/2025 30.5  26.0 - 34.0 pg Final    MCHC 07/21/2025 33.2  32.0 - 36.0 g/dL Final    RDW 07/21/2025 11.4 (L)  11.5 - 14.5 % Final    Platelets 07/21/2025 " 200  150 - 450 x10*3/uL Final    Neutrophils % 07/21/2025 45.5  40.0 - 80.0 % Final    Immature Granulocytes %, Automated 07/21/2025 0.2  0.0 - 0.9 % Final    Immature Granulocyte Count (IG) includes promyelocytes, myelocytes and metamyelocytes but does not include bands. Percent differential counts (%) should be interpreted in the context of the absolute cell counts (cells/UL).    Lymphocytes % 07/21/2025 41.6  13.0 - 44.0 % Final    Monocytes % 07/21/2025 8.8  2.0 - 10.0 % Final    Eosinophils % 07/21/2025 3.5  0.0 - 6.0 % Final    Basophils % 07/21/2025 0.4  0.0 - 2.0 % Final    Neutrophils Absolute 07/21/2025 2.22  1.20 - 7.70 x10*3/uL Final    Percent differential counts (%) should be interpreted in the context of the absolute cell counts (cells/uL).    Immature Granulocytes Absolute, Au* 07/21/2025 0.01  0.00 - 0.70 x10*3/uL Final    Lymphocytes Absolute 07/21/2025 2.03  1.20 - 4.80 x10*3/uL Final    Monocytes Absolute 07/21/2025 0.43  0.10 - 1.00 x10*3/uL Final    Eosinophils Absolute 07/21/2025 0.17  0.00 - 0.70 x10*3/uL Final    Basophils Absolute 07/21/2025 0.02  0.00 - 0.10 x10*3/uL Final    Glucose 07/21/2025 85  74 - 99 mg/dL Final    Sodium 07/21/2025 143  136 - 145 mmol/L Final    Potassium 07/21/2025 3.7  3.5 - 5.3 mmol/L Final    Chloride 07/21/2025 108 (H)  98 - 107 mmol/L Final    Bicarbonate 07/21/2025 32  21 - 32 mmol/L Final    Anion Gap 07/21/2025 7 (L)  10 - 20 mmol/L Final    Urea Nitrogen 07/21/2025 12  6 - 23 mg/dL Final    Creatinine 07/21/2025 0.63  0.50 - 1.05 mg/dL Final    eGFR 07/21/2025 >90  >60 mL/min/1.73m*2 Final    Calculations of estimated GFR are performed using the 2021 CKD-EPI Study Refit equation without the race variable for the IDMS-Traceable creatinine methods.  https://jasn.asnjournals.org/content/early/2021/09/22/ASN.9109223446    Calcium 07/21/2025 8.6  8.6 - 10.6 mg/dL Final    Albumin 07/21/2025 3.6  3.4 - 5.0 g/dL Final    Alkaline Phosphatase 07/21/2025 61  33 -  136 U/L Final    Total Protein 07/21/2025 6.7  6.4 - 8.2 g/dL Final    AST 07/21/2025 12  9 - 39 U/L Final    Bilirubin, Total 07/21/2025 1.4 (H)  0.0 - 1.2 mg/dL Final    ALT 07/21/2025 7  7 - 45 U/L Final    Patients treated with Sulfasalazine may generate falsely decreased results for ALT.    Protime 07/21/2025 11.1  9.8 - 12.4 seconds Final    INR 07/21/2025 1.0  0.9 - 1.1 Final    aPTT 07/21/2025 28  26 - 36 seconds Final   Admission on 06/02/2025, Discharged on 06/03/2025   Component Date Value Ref Range Status    Glucose 06/03/2025 85  74 - 99 mg/dL Final    Sodium 06/03/2025 136  136 - 145 mmol/L Final    Potassium 06/03/2025 3.6  3.5 - 5.3 mmol/L Final    Chloride 06/03/2025 101  98 - 107 mmol/L Final    Bicarbonate 06/03/2025 30  21 - 32 mmol/L Final    Anion Gap 06/03/2025 9 (L)  10 - 20 mmol/L Final    Urea Nitrogen 06/03/2025 10  6 - 23 mg/dL Final    Creatinine 06/03/2025 0.51  0.50 - 1.05 mg/dL Final    eGFR 06/03/2025 >90  >60 mL/min/1.73m*2 Final    Calculations of estimated GFR are performed using the 2021 CKD-EPI Study Refit equation without the race variable for the IDMS-Traceable creatinine methods.  https://jasn.asnjournals.org/content/early/2021/09/22/ASN.9445937795    Calcium 06/03/2025 9.0  8.6 - 10.3 mg/dL Final    Albumin 06/03/2025 4.2  3.4 - 5.0 g/dL Final    Alkaline Phosphatase 06/03/2025 67  33 - 136 U/L Final    Total Protein 06/03/2025 7.9  6.4 - 8.2 g/dL Final    AST 06/03/2025 16  9 - 39 U/L Final    Bilirubin, Total 06/03/2025 1.2  0.0 - 1.2 mg/dL Final    ALT 06/03/2025 11  7 - 45 U/L Final    Patients treated with Sulfasalazine may generate falsely decreased results for ALT.    BNP 06/03/2025 49  0 - 99 pg/mL Final   Admission on 02/20/2025, Discharged on 02/21/2025   Component Date Value Ref Range Status    WBC 02/21/2025 4.8  4.4 - 11.3 x10*3/uL Final    nRBC 02/21/2025 0.0  0.0 - 0.0 /100 WBCs Final    RBC 02/21/2025 3.61 (L)  4.00 - 5.20 x10*6/uL Final    Hemoglobin  02/21/2025 11.2 (L)  12.0 - 16.0 g/dL Final    Hematocrit 02/21/2025 33.6 (L)  36.0 - 46.0 % Final    MCV 02/21/2025 93  80 - 100 fL Final    MCH 02/21/2025 31.0  26.0 - 34.0 pg Final    MCHC 02/21/2025 33.3  32.0 - 36.0 g/dL Final    RDW 02/21/2025 11.5  11.5 - 14.5 % Final    Platelets 02/21/2025 222  150 - 450 x10*3/uL Final    Neutrophils % 02/21/2025 57.6  40.0 - 80.0 % Final    Immature Granulocytes %, Automated 02/21/2025 0.2  0.0 - 0.9 % Final    Immature Granulocyte Count (IG) includes promyelocytes, myelocytes and metamyelocytes but does not include bands. Percent differential counts (%) should be interpreted in the context of the absolute cell counts (cells/UL).    Lymphocytes % 02/21/2025 32.9  13.0 - 44.0 % Final    Monocytes % 02/21/2025 8.1  2.0 - 10.0 % Final    Eosinophils % 02/21/2025 0.8  0.0 - 6.0 % Final    Basophils % 02/21/2025 0.4  0.0 - 2.0 % Final    Neutrophils Absolute 02/21/2025 2.79  1.20 - 7.70 x10*3/uL Final    Percent differential counts (%) should be interpreted in the context of the absolute cell counts (cells/uL).    Immature Granulocytes Absolute, Au* 02/21/2025 0.01  0.00 - 0.70 x10*3/uL Final    Lymphocytes Absolute 02/21/2025 1.59  1.20 - 4.80 x10*3/uL Final    Monocytes Absolute 02/21/2025 0.39  0.10 - 1.00 x10*3/uL Final    Eosinophils Absolute 02/21/2025 0.04  0.00 - 0.70 x10*3/uL Final    Basophils Absolute 02/21/2025 0.02  0.00 - 0.10 x10*3/uL Final    Glucose 02/21/2025 105 (H)  74 - 99 mg/dL Final    Sodium 02/21/2025 139  136 - 145 mmol/L Final    Potassium 02/21/2025 3.8  3.5 - 5.3 mmol/L Final    Chloride 02/21/2025 108 (H)  98 - 107 mmol/L Final    Bicarbonate 02/21/2025 25  21 - 32 mmol/L Final    Anion Gap 02/21/2025 10  10 - 20 mmol/L Final    Urea Nitrogen 02/21/2025 19  6 - 23 mg/dL Final    Creatinine 02/21/2025 0.56  0.50 - 1.05 mg/dL Final    eGFR 02/21/2025 >90  >60 mL/min/1.73m*2 Final    Calculations of estimated GFR are performed using the 2021 CKD-EPI  Study Refit equation without the race variable for the IDMS-Traceable creatinine methods.  https://jasn.asnjournals.org/content/early/2021/09/22/ASN.5639646027    Calcium 02/21/2025 8.7  8.6 - 10.3 mg/dL Final    Albumin 02/21/2025 3.9  3.4 - 5.0 g/dL Final    Alkaline Phosphatase 02/21/2025 60  33 - 136 U/L Final    Total Protein 02/21/2025 7.4  6.4 - 8.2 g/dL Final    AST 02/21/2025 19  9 - 39 U/L Final    Bilirubin, Total 02/21/2025 0.9  0.0 - 1.2 mg/dL Final    ALT 02/21/2025 17  7 - 45 U/L Final    Patients treated with Sulfasalazine may generate falsely decreased results for ALT.    Magnesium 02/21/2025 2.25  1.60 - 2.40 mg/dL Final    Protime 02/21/2025 12.2  9.8 - 12.8 seconds Final    INR 02/21/2025 1.1  0.9 - 1.1 Final    Troponin I, High Sensitivity 02/21/2025 4  0 - 13 ng/L Final    D-Dimer, Quantitative VTE Exclusion 02/21/2025 412  <=500 ng/mL FEU Final    Troponin I, High Sensitivity 02/21/2025 5  0 - 13 ng/L Final   Admission on 02/11/2025, Discharged on 02/11/2025   Component Date Value Ref Range Status    WBC 02/11/2025 3.6 (L)  4.4 - 11.3 x10*3/uL Final    nRBC 02/11/2025 0.0  0.0 - 0.0 /100 WBCs Final    RBC 02/11/2025 3.60 (L)  4.00 - 5.20 x10*6/uL Final    Hemoglobin 02/11/2025 11.2 (L)  12.0 - 16.0 g/dL Final    Hematocrit 02/11/2025 32.2 (L)  36.0 - 46.0 % Final    MCV 02/11/2025 89  80 - 100 fL Final    MCH 02/11/2025 31.1  26.0 - 34.0 pg Final    MCHC 02/11/2025 34.8  32.0 - 36.0 g/dL Final    RDW 02/11/2025 11.3 (L)  11.5 - 14.5 % Final    Platelets 02/11/2025 235  150 - 450 x10*3/uL Final    Neutrophils % 02/11/2025 49.1  40.0 - 80.0 % Final    Immature Granulocytes %, Automated 02/11/2025 0.0  0.0 - 0.9 % Final    Immature Granulocyte Count (IG) includes promyelocytes, myelocytes and metamyelocytes but does not include bands. Percent differential counts (%) should be interpreted in the context of the absolute cell counts (cells/UL).    Lymphocytes % 02/11/2025 39.9  13.0 - 44.0 % Final     Monocytes % 02/11/2025 8.7  2.0 - 10.0 % Final    Eosinophils % 02/11/2025 1.7  0.0 - 6.0 % Final    Basophils % 02/11/2025 0.6  0.0 - 2.0 % Final    Neutrophils Absolute 02/11/2025 1.76  1.20 - 7.70 x10*3/uL Final    Percent differential counts (%) should be interpreted in the context of the absolute cell counts (cells/uL).    Immature Granulocytes Absolute, Au* 02/11/2025 0.00  0.00 - 0.70 x10*3/uL Final    Lymphocytes Absolute 02/11/2025 1.43  1.20 - 4.80 x10*3/uL Final    Monocytes Absolute 02/11/2025 0.31  0.10 - 1.00 x10*3/uL Final    Eosinophils Absolute 02/11/2025 0.06  0.00 - 0.70 x10*3/uL Final    Basophils Absolute 02/11/2025 0.02  0.00 - 0.10 x10*3/uL Final    Glucose 02/11/2025 99  74 - 99 mg/dL Final    Sodium 02/11/2025 143  136 - 145 mmol/L Final    Potassium 02/11/2025 4.3  3.5 - 5.3 mmol/L Final    Chloride 02/11/2025 107  98 - 107 mmol/L Final    Bicarbonate 02/11/2025 30  21 - 32 mmol/L Final    Anion Gap 02/11/2025 10  10 - 20 mmol/L Final    Urea Nitrogen 02/11/2025 13  6 - 23 mg/dL Final    Creatinine 02/11/2025 0.62  0.50 - 1.05 mg/dL Final    eGFR 02/11/2025 >90  >60 mL/min/1.73m*2 Final    Calculations of estimated GFR are performed using the 2021 CKD-EPI Study Refit equation without the race variable for the IDMS-Traceable creatinine methods.  https://jasn.asnjournals.org/content/early/2021/09/22/ASN.4559029006    Calcium 02/11/2025 9.0  8.6 - 10.6 mg/dL Final    Albumin 02/11/2025 3.8  3.4 - 5.0 g/dL Final    Alkaline Phosphatase 02/11/2025 60  33 - 136 U/L Final    Total Protein 02/11/2025 7.0  6.4 - 8.2 g/dL Final    AST 02/11/2025 21  9 - 39 U/L Final    Bilirubin, Total 02/11/2025 1.2  0.0 - 1.2 mg/dL Final    ALT 02/11/2025 15  7 - 45 U/L Final    Patients treated with Sulfasalazine may generate falsely decreased results for ALT.    Magnesium 02/11/2025 2.28  1.60 - 2.40 mg/dL Final    Troponin I, High Sensitivity (CMC) 02/11/2025 3  0 - 34 ng/L Final    BNP 02/11/2025 24  0 - 99  pg/mL Final    Ventricular Rate 02/11/2025 63  BPM Final    Atrial Rate 02/11/2025 63  BPM Final    OR Interval 02/11/2025 192  ms Final    QRS Duration 02/11/2025 80  ms Final    QT Interval 02/11/2025 412  ms Final    QTC Calculation(Bazett) 02/11/2025 421  ms Final    P Axis 02/11/2025 53  degrees Final    R Axis 02/11/2025 -39  degrees Final    T Axis 02/11/2025 31  degrees Final    QRS Count 02/11/2025 10  beats Final    Q Onset 02/11/2025 215  ms Final    P Onset 02/11/2025 119  ms Final    P Offset 02/11/2025 172  ms Final    T Offset 02/11/2025 421  ms Final    QTC Fredericia 02/11/2025 418  ms Final    Color, Urine 02/11/2025 Yellow  Light-Yellow, Yellow, Dark-Yellow Final    Appearance, Urine 02/11/2025 Clear  Clear Final    Specific Gravity, Urine 02/11/2025 1.031  1.005 - 1.035 Final    pH, Urine 02/11/2025 5.5  5.0, 5.5, 6.0, 6.5, 7.0, 7.5, 8.0 Final    Protein, Urine 02/11/2025 20 (TRACE)  NEGATIVE, 10 (TRACE), 20 (TRACE) mg/dL Final    Glucose, Urine 02/11/2025 50 (TRACE) (A)  Normal mg/dL Final    Blood, Urine 02/11/2025 0.03 (TRACE) (A)  NEGATIVE mg/dL Final    Ketones, Urine 02/11/2025 NEGATIVE  NEGATIVE mg/dL Final    Bilirubin, Urine 02/11/2025 NEGATIVE  NEGATIVE mg/dL Final    Urobilinogen, Urine 02/11/2025 Normal  Normal mg/dL Final    Nitrite, Urine 02/11/2025 NEGATIVE  NEGATIVE Final    Leukocyte Esterase, Urine 02/11/2025 75 Lauren/uL (A)  NEGATIVE Final    Extra Tube 02/11/2025 Hold for add-ons.   Final    Auto resulted.    WBC, Urine 02/11/2025 1-5  1-5, NONE /HPF Final    RBC, Urine 02/11/2025 6-10 (A)  NONE, 1-2, 3-5 /HPF Final    Squamous Epithelial Cells, Urine 02/11/2025 1-9 (SPARSE)  Reference range not established. /HPF Final    Mucus, Urine 02/11/2025 2+  Reference range not established. /LPF Final    Urine Culture 02/11/2025 Growth indicates contamination with periurethral brittany. Repeat culture if clinically indicated.   Final   Admission on 11/28/2024, Discharged on 11/28/2024    Component Date Value Ref Range Status    WBC 11/28/2024 5.9  4.4 - 11.3 x10*3/uL Final    nRBC 11/28/2024 0.0  0.0 - 0.0 /100 WBCs Final    RBC 11/28/2024 3.64 (L)  4.00 - 5.20 x10*6/uL Final    Hemoglobin 11/28/2024 11.3 (L)  12.0 - 16.0 g/dL Final    Hematocrit 11/28/2024 32.1 (L)  36.0 - 46.0 % Final    MCV 11/28/2024 88  80 - 100 fL Final    MCH 11/28/2024 31.0  26.0 - 34.0 pg Final    MCHC 11/28/2024 35.2  32.0 - 36.0 g/dL Final    RDW 11/28/2024 11.4 (L)  11.5 - 14.5 % Final    Platelets 11/28/2024 256  150 - 450 x10*3/uL Final    Neutrophils % 11/28/2024 66.1  40.0 - 80.0 % Final    Immature Granulocytes %, Automated 11/28/2024 0.3  0.0 - 0.9 % Final    Immature Granulocyte Count (IG) includes promyelocytes, myelocytes and metamyelocytes but does not include bands. Percent differential counts (%) should be interpreted in the context of the absolute cell counts (cells/UL).    Lymphocytes % 11/28/2024 24.7  13.0 - 44.0 % Final    Monocytes % 11/28/2024 7.1  2.0 - 10.0 % Final    Eosinophils % 11/28/2024 1.5  0.0 - 6.0 % Final    Basophils % 11/28/2024 0.3  0.0 - 2.0 % Final    Neutrophils Absolute 11/28/2024 3.90  1.20 - 7.70 x10*3/uL Final    Percent differential counts (%) should be interpreted in the context of the absolute cell counts (cells/uL).    Immature Granulocytes Absolute, Au* 11/28/2024 0.02  0.00 - 0.70 x10*3/uL Final    Lymphocytes Absolute 11/28/2024 1.46  1.20 - 4.80 x10*3/uL Final    Monocytes Absolute 11/28/2024 0.42  0.10 - 1.00 x10*3/uL Final    Eosinophils Absolute 11/28/2024 0.09  0.00 - 0.70 x10*3/uL Final    Basophils Absolute 11/28/2024 0.02  0.00 - 0.10 x10*3/uL Final    Glucose 11/28/2024 82  74 - 99 mg/dL Final    Sodium 11/28/2024 139  136 - 145 mmol/L Final    Potassium 11/28/2024 3.8  3.5 - 5.3 mmol/L Final    Chloride 11/28/2024 106  98 - 107 mmol/L Final    Bicarbonate 11/28/2024 26  21 - 32 mmol/L Final    Anion Gap 11/28/2024 11  10 - 20 mmol/L Final    Urea Nitrogen  11/28/2024 15  6 - 23 mg/dL Final    Creatinine 11/28/2024 0.55  0.50 - 1.05 mg/dL Final    eGFR 11/28/2024 >90  >60 mL/min/1.73m*2 Final    Calculations of estimated GFR are performed using the 2021 CKD-EPI Study Refit equation without the race variable for the IDMS-Traceable creatinine methods.  https://jasn.asnjournals.org/content/early/2021/09/22/ASN.5654295215    Calcium 11/28/2024 8.9  8.6 - 10.6 mg/dL Final    Albumin 11/28/2024 3.8  3.4 - 5.0 g/dL Final    Alkaline Phosphatase 11/28/2024 71  33 - 136 U/L Final    Total Protein 11/28/2024 7.4  6.4 - 8.2 g/dL Final    AST 11/28/2024 13  9 - 39 U/L Final    Bilirubin, Total 11/28/2024 1.4 (H)  0.0 - 1.2 mg/dL Final    ALT 11/28/2024 5 (L)  7 - 45 U/L Final    Patients treated with Sulfasalazine may generate falsely decreased results for ALT.    Troponin I, High Sensitivity (CMC) 11/28/2024 <3  0 - 34 ng/L Final    BNP 11/28/2024 35  0 - 99 pg/mL Final    Flu A Result 11/28/2024 Not Detected  Not Detected Final    Flu B Result 11/28/2024 Not Detected  Not Detected Final    Coronavirus 2019, PCR 11/28/2024 Not Detected  Not Detected Final    RSV PCR 11/28/2024 Not Detected  Not Detected Final    Color, Urine 11/28/2024 Light-Yellow  Light-Yellow, Yellow, Dark-Yellow Final    Appearance, Urine 11/28/2024 Clear  Clear Final    Specific Gravity, Urine 11/28/2024 1.017  1.005 - 1.035 Final    pH, Urine 11/28/2024 6.0  5.0, 5.5, 6.0, 6.5, 7.0, 7.5, 8.0 Final    Protein, Urine 11/28/2024 NEGATIVE  NEGATIVE, 10 (TRACE), 20 (TRACE) mg/dL Final    Glucose, Urine 11/28/2024 Normal  Normal mg/dL Final    Blood, Urine 11/28/2024 0.03 (TRACE) (A)  NEGATIVE Final    Ketones, Urine 11/28/2024 NEGATIVE  NEGATIVE mg/dL Final    Bilirubin, Urine 11/28/2024 NEGATIVE  NEGATIVE Final    Urobilinogen, Urine 11/28/2024 Normal  Normal mg/dL Final    Nitrite, Urine 11/28/2024 NEGATIVE  NEGATIVE Final    Leukocyte Esterase, Urine 11/28/2024 NEGATIVE  NEGATIVE Final    Extra Tube  11/28/2024 Hold for add-ons.   Final    Auto resulted.    WBC, Urine 11/28/2024 1-5  1-5, NONE /HPF Final    RBC, Urine 11/28/2024 3-5  NONE, 1-2, 3-5 /HPF Final    Bacteria, Urine 11/28/2024 1+ (A)  NONE SEEN /HPF Final    Mucus, Urine 11/28/2024 FEW  Reference range not established. /LPF Final   Admission on 10/14/2024, Discharged on 10/14/2024   Component Date Value Ref Range Status    Group A Strep PCR 10/14/2024 Not Detected  Not Detected Final    This assay is an FDA-cleared, real-time PCR test for the qualitative detection of Group A Streptococcus bacterial DNA from throat swabs that have not undergone a nucleic acid extraction. Negative results do not require confirmation by culture.     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   PT/OT evaluation   Follow up in  4-6 weeks with Neurology              Tiffanie Daily MD

## 2025-08-05 ENCOUNTER — APPOINTMENT (OUTPATIENT)
Dept: CARDIOLOGY | Facility: HOSPITAL | Age: 65
DRG: 880 | End: 2025-08-05
Payer: MEDICARE

## 2025-08-05 ENCOUNTER — PHARMACY VISIT (OUTPATIENT)
Dept: PHARMACY | Facility: CLINIC | Age: 65
End: 2025-08-05
Payer: COMMERCIAL

## 2025-08-05 ENCOUNTER — HOSPITAL ENCOUNTER (INPATIENT)
Dept: CARDIOLOGY | Facility: HOSPITAL | Age: 65
Discharge: HOME | DRG: 880 | End: 2025-08-05
Payer: MEDICARE

## 2025-08-05 VITALS
RESPIRATION RATE: 16 BRPM | HEIGHT: 64 IN | BODY MASS INDEX: 29.81 KG/M2 | WEIGHT: 174.6 LBS | SYSTOLIC BLOOD PRESSURE: 124 MMHG | HEART RATE: 60 BPM | TEMPERATURE: 96.6 F | DIASTOLIC BLOOD PRESSURE: 76 MMHG | OXYGEN SATURATION: 100 %

## 2025-08-05 DIAGNOSIS — G45.9 TIA (TRANSIENT ISCHEMIC ATTACK): ICD-10-CM

## 2025-08-05 PROBLEM — I63.9 ACUTE ISCHEMIC STROKE (MULTI): Status: RESOLVED | Noted: 2025-08-04 | Resolved: 2025-08-05

## 2025-08-05 LAB
ALBUMIN SERPL BCP-MCNC: 3.3 G/DL (ref 3.4–5)
ANION GAP SERPL CALC-SCNC: 7 MMOL/L (ref 10–20)
BODY SURFACE AREA: 1.89 M2
BUN SERPL-MCNC: 20 MG/DL (ref 6–23)
CALCIUM SERPL-MCNC: 8.5 MG/DL (ref 8.6–10.3)
CHLORIDE SERPL-SCNC: 107 MMOL/L (ref 98–107)
CO2 SERPL-SCNC: 30 MMOL/L (ref 21–32)
CREAT SERPL-MCNC: 0.74 MG/DL (ref 0.5–1.05)
EGFRCR SERPLBLD CKD-EPI 2021: >90 ML/MIN/1.73M*2
EJECTION FRACTION APICAL 4 CHAMBER: 59.7
EJECTION FRACTION: 64 %
ERYTHROCYTE [DISTWIDTH] IN BLOOD BY AUTOMATED COUNT: 12.7 % (ref 11.5–14.5)
GLUCOSE SERPL-MCNC: 99 MG/DL (ref 74–99)
HCT VFR BLD AUTO: 30.5 % (ref 36–46)
HGB BLD-MCNC: 9.7 G/DL (ref 12–16)
LEFT ATRIUM VOLUME AREA LENGTH INDEX BSA: 39.6 ML/M2
LEFT VENTRICLE INTERNAL DIMENSION DIASTOLE: 5.1 CM (ref 3.5–6)
LV EJECTION FRACTION BIPLANE: 64 %
MAGNESIUM SERPL-MCNC: 2.05 MG/DL (ref 1.6–2.4)
MCH RBC QN AUTO: 31.1 PG (ref 26–34)
MCHC RBC AUTO-ENTMCNC: 31.8 G/DL (ref 32–36)
MCV RBC AUTO: 98 FL (ref 80–100)
MITRAL VALVE E/A RATIO: 1.1
NRBC BLD-RTO: 0 /100 WBCS (ref 0–0)
PHOSPHATE SERPL-MCNC: 4.6 MG/DL (ref 2.5–4.9)
PLATELET # BLD AUTO: 221 X10*3/UL (ref 150–450)
POTASSIUM SERPL-SCNC: 4 MMOL/L (ref 3.5–5.3)
RBC # BLD AUTO: 3.12 X10*6/UL (ref 4–5.2)
RIGHT VENTRICLE FREE WALL PEAK S': 16.9 CM/S
RIGHT VENTRICLE PEAK SYSTOLIC PRESSURE: 26 MMHG
SODIUM SERPL-SCNC: 140 MMOL/L (ref 136–145)
TRICUSPID ANNULAR PLANE SYSTOLIC EXCURSION: 2.9 CM
WBC # BLD AUTO: 3.7 X10*3/UL (ref 4.4–11.3)

## 2025-08-05 PROCEDURE — 2500000004 HC RX 250 GENERAL PHARMACY W/ HCPCS (ALT 636 FOR OP/ED)

## 2025-08-05 PROCEDURE — 83735 ASSAY OF MAGNESIUM: CPT

## 2025-08-05 PROCEDURE — 2500000001 HC RX 250 WO HCPCS SELF ADMINISTERED DRUGS (ALT 637 FOR MEDICARE OP)

## 2025-08-05 PROCEDURE — 93308 TTE F-UP OR LMTD: CPT

## 2025-08-05 PROCEDURE — 97530 THERAPEUTIC ACTIVITIES: CPT | Mod: GP,CQ

## 2025-08-05 PROCEDURE — 36415 COLL VENOUS BLD VENIPUNCTURE: CPT

## 2025-08-05 PROCEDURE — 99239 HOSP IP/OBS DSCHRG MGMT >30: CPT | Performed by: STUDENT IN AN ORGANIZED HEALTH CARE EDUCATION/TRAINING PROGRAM

## 2025-08-05 PROCEDURE — 80069 RENAL FUNCTION PANEL: CPT

## 2025-08-05 PROCEDURE — 97116 GAIT TRAINING THERAPY: CPT | Mod: GP,CQ

## 2025-08-05 PROCEDURE — 85027 COMPLETE CBC AUTOMATED: CPT

## 2025-08-05 PROCEDURE — RXMED WILLOW AMBULATORY MEDICATION CHARGE

## 2025-08-05 PROCEDURE — 93246 EXT ECG>7D<15D RECORDING: CPT

## 2025-08-05 RX ORDER — LISINOPRIL 20 MG/1
20 TABLET ORAL DAILY
Qty: 30 TABLET | Refills: 2 | Status: SHIPPED | OUTPATIENT
Start: 2025-08-05 | End: 2025-11-03

## 2025-08-05 RX ORDER — ASPIRIN 81 MG/1
81 TABLET ORAL DAILY
Qty: 30 TABLET | Refills: 2 | Status: SHIPPED | OUTPATIENT
Start: 2025-08-05 | End: 2025-11-03

## 2025-08-05 RX ADMIN — LISINOPRIL 20 MG: 20 TABLET ORAL at 09:01

## 2025-08-05 RX ADMIN — ASPIRIN 81 MG: 81 TABLET, COATED ORAL at 09:01

## 2025-08-05 RX ADMIN — ENOXAPARIN SODIUM 40 MG: 100 INJECTION SUBCUTANEOUS at 09:01

## 2025-08-05 ASSESSMENT — COGNITIVE AND FUNCTIONAL STATUS - GENERAL
MOVING TO AND FROM BED TO CHAIR: A LITTLE
DRESSING REGULAR LOWER BODY CLOTHING: A LITTLE
MOBILITY SCORE: 21
WALKING IN HOSPITAL ROOM: A LITTLE
DAILY ACTIVITIY SCORE: 23
STANDING UP FROM CHAIR USING ARMS: A LITTLE
CLIMB 3 TO 5 STEPS WITH RAILING: A LOT
WALKING IN HOSPITAL ROOM: A LITTLE
CLIMB 3 TO 5 STEPS WITH RAILING: A LITTLE
MOBILITY SCORE: 20

## 2025-08-05 ASSESSMENT — PAIN - FUNCTIONAL ASSESSMENT
PAIN_FUNCTIONAL_ASSESSMENT: 0-10
PAIN_FUNCTIONAL_ASSESSMENT: 0-10

## 2025-08-05 ASSESSMENT — PAIN SCALES - GENERAL
PAINLEVEL_OUTOF10: 0 - NO PAIN
PAINLEVEL_OUTOF10: 0 - NO PAIN

## 2025-08-05 NOTE — PROGRESS NOTES
"   08/05/25 1040   Discharge Planning   Expected Discharge Disposition Home   Patient Choice   Provider Choice list and CMS website (https://medicare.gov/care-compare#search) for post-acute Quality and Resource Measure Data were provided and reviewed with: Patient   Intensity of Service   Intensity of Service 0-30 min     Met with patient at bedside. She lives at home alone in [place]. Patient states she is independent, no use of DME. Therapy team recommending high intensity rehab for patient at d/c. Printed an acute rehab list for patient and discussed AR level of care with her. Patient is declining acute rehab. She states she feels safe to return home at d/c. She is agreeable to accept orders for outpatient therapy. Patient does not have a PCP, and EMR shows that she has Medicare A only. Patient states that \"someone from the county\" just called her to discuss a Medicaid application. LSW updated and will follow up with patient regarding [facility] and to provide PCP list of offices who accept Medicaid.     1205-  PCP list provided.   "

## 2025-08-05 NOTE — DISCHARGE INSTRUCTIONS
Please follow up closely with neurology team after discharge for continued care, you will need to schedule this appointment in 4 weeks. Neurology team will follow up on the cardiac event monitor placed in the hospital.  We have referred you to a primary care physician to have close follow up with your blood pressure.

## 2025-08-05 NOTE — ASSESSMENT & PLAN NOTE
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   PT/OT evaluation   Follow up in  4-6 weeks with Neurology

## 2025-08-05 NOTE — PROGRESS NOTES
Physical Therapy    Physical Therapy Treatment    Patient Name: Ny Martinez  MRN: [id number]  Today's Date: 8/5/2025  Time Calculation  Start Time: 1100  Stop Time: 1130  Time Calculation (min): 30 min     3016/3016-A       08/05/25 1100   PT  Visit   PT Received On 08/05/25   Response to Previous Treatment Patient with no complaints from previous session.   General   Reason for Referral stroke work up   Referred By Dr. Gaston   Past Medical History Relevant to Rehab Pt admitted with numbness and weakness in RUE/RLE. CT and MRI negative for acute process, though did show white matter changes. TNK not given as time since last well was outside of parameters.PMH includes HTN, glaucoma, legally blind, NAFLD, ROHITH, cellulitis. Hospitalized earlier this year with chest pain due to myocardial ischemia.   Family/Caregiver Present Yes   Prior to Session Communication Bedside nurse   Patient Position Received Bed, 3 rail up;Alarm off, not on at start of session   Preferred Learning Style verbal;visual   Precautions   Medical Precautions Fall precautions   Pain Assessment   Pain Assessment 0-10   0-10 (Numeric) Pain Score 0 - No pain   Cognition   Overall Cognitive Status WFL   Orientation Level Oriented X4   Coordination   Movements are Fluid and Coordinated Yes   Finger to Nose Intact   Rapid Alternating Movements Intact   Alternating Toe Taps Intact   Heel to [place] Intact   Finger to Target Intact   Postural Control   Postural Control WFL   Static Sitting Balance   Static Sitting-Balance Support No upper extremity supported   Static Sitting-Level of Assistance Modified independent   Dynamic Sitting Balance   Dynamic Sitting-Balance Support No upper extremity supported   Dynamic Sitting-Level of Assistance Modified independent   Dynamic Sitting-Balance Reaching for objects;Reaching across midline   Static Standing Balance   Static Standing-Balance Support No upper extremity supported   Static Standing-Level of Assistance Close  supervision   Bed Mobility   Bed Mobility Yes   Bed Mobility 1   Bed Mobility 1 Supine to sitting   Level of Assistance 1 Close supervision   Ambulation/Gait Training   Ambulation/Gait Training Performed Yes   Ambulation/Gait Training 1   Surface 1 Level tile   Device 1 No device   Gait Support Devices Gait belt   Assistance 1 Close supervision   Quality of Gait 1 WBOS;Diminished heel strike;Trendelenburg   Comments/Distance (ft) 1 85' x2   Transfers   Transfer Yes   Transfer 1   Transfer From 1 Bed to   Transfer to 1 Stand   Technique 1 Sit to stand;Stand to sit   Transfer Device 1 Gait belt   Transfer Level of Assistance 1 Close supervision   Stairs   Rails 1 Left   Device 1 Railing   Support Devices 1 Gait belt   Assistance 1 Close supervision   Comment/Number of Steps 1 12 steps ascent and descent non reciprocal   Activity Tolerance   Endurance Endurance does not limit participation in activity   PT Assessment   PT Assessment Results Decreased strength;Decreased endurance;Impaired balance;Decreased mobility   Rehab Prognosis Excellent   Evaluation/Treatment Tolerance Patient tolerated treatment well   End of Session Communication Bedside nurse   End of Session Patient Position Bed, 3 rail up;Alarm off, not on at start of session  (sitting edge of bed, no alarm, nurse aware)   Outpatient Education   Individual(s) Educated Patient   Education Provided Fall Risk;Body Mechanics;Home Safety;POC   Risk and Benefits Discussed with Patient/Caregiver/Other yes   Patient/Caregiver Demonstrated Understanding yes   Plan of Care Discussed and Agreed Upon yes   Patient Response to Education Patient/Caregiver Verbalized Understanding of Information   PT Plan   Inpatient/Swing Bed or Outpatient Inpatient   PT Plan   Treatment/Interventions Bed mobility;Transfer training;Gait training;Stair training;Therapeutic activity   PT Plan Ongoing PT   PT Frequency 5 times per week   PT Discharge Recommendations High intensity level of  continued care   Equipment Recommended upon Discharge Other (comment)   PT Recommended Transfer Status Stand by assist;Assistive device       Outcome Measures:  [facility] Basic Mobility  Turning from your back to your side while in a flat bed without using bedrails: None  Moving from lying on your back to sitting on the side of a flat bed without using bedrails: None  Moving to and from bed to chair (including a wheelchair): A little  Standing up from a chair using your arms (e.g. wheelchair or bedside chair): A little  To walk in hospital room: A little  Climbing 3-5 steps with railing: A little  Basic Mobility - Total Score: 20     EDUCATION:  Outpatient Education  Individual(s) Educated: Patient  Education Provided: Fall Risk, Body Mechanics, Home Safety, POC  Risk and Benefits Discussed with Patient/Caregiver/Other: yes  Patient/Caregiver Demonstrated Understanding: yes  Plan of Care Discussed and Agreed Upon: yes  Patient Response to Education: Patient/Caregiver Verbalized Understanding of Information      GOALS:  Encounter Problems       Encounter Problems (Active)       PT Problem       Strength       Start:  08/04/25    Expected End:  08/18/25       Pt will increase RLE strength to 4/5 to improve stance stability.           Balance       Start:  08/04/25    Expected End:  08/18/25       Pt will improve static/dynamic standing balance to Good in order to improve safety with functional tasks.           Bed mobility       Start:  08/04/25    Expected End:  08/18/25       Pt transfer sup<>sit with IND.           Transfers       Start:  08/04/25    Expected End:  08/18/25       Pt will transfer sit<>stand with mod I.           Gait       Start:  08/04/25    Expected End:  08/18/25       Pt will AMB 50 Feet with least restrictive device And mod I.           Stair negotiation       Start:  08/04/25    Expected End:  08/18/25       Pt will ascend/descend 12 steps with 1 handrail and least restrictive device.

## 2025-08-05 NOTE — NURSING NOTE
ADOD: 8/5.   Destination: home  Transportation Provided by: TBD  Patient feels updated by provider(s) and involved in POC.   Patient has been advised to defer to AVS for new medications and follow-up visits.   Patient is active with [facility]  Patient's Pharmacy [facility].  Appointments will be made by patient.  Patient has been notified about a survey and call back is to be expected 1-2 weeks post discharge.   Notified patient that DC Nurse is available everyday throughout their stay if any assistance is needed.     M2B delivered  Holter Monitor applied  IVL dc'd, site clear   Reviewed AVS and all questions answered  Pending patients transportation needs, she is currently calling family                    touch

## 2025-08-05 NOTE — PROGRESS NOTES
08/05/25 1332   Discharge Planning   Home or Post Acute Services Community services     Met with pt to discuss insurance coverage and to provide any needed resources. Pt confirmed that she has Medicare A. She did have Medicare B under B, but she no longer qualifies for QMB and can't afford part B. She does have Medicaid as a supplement. Pt currently does not have a PCP. Pt was provided information on [facility] and  physician list. No other SW concerns voiced. Pt to return home at discharge.

## 2025-08-05 NOTE — DISCHARGE SUMMARY
Discharge Diagnosis  Conversion Disorder           Issues Requiring Follow-Up  Will be put on asprin and given holter monitor, Neurology follow up 4 weeks    Discharge Meds     Medication List      START taking these medications     aspirin 81 mg EC tablet; Take 1 tablet (81 mg) by mouth once daily.     CONTINUE taking these medications     lisinopril 20 mg tablet; Take 1 tablet (20 mg) by mouth once daily.       Test Results Pending At Discharge  Pending Labs       No current pending labs.            Hospital Course  Ny Martinez  is a 65 y.o. female with PMHx of HTN, glaucoma, and legal blindness presented to the ED with a chief complaint of right-sided numbness and weakness. In the ED she received a head CT which showed no acute intercranial hemorrhage or mass effect. She was admitted with concerns of stroke. While in the hospital she underwent MRI of the head and neck which showed no evidence of stroke. She initially presented with notable weakness and difficulty controlling her right extremities. She is now able to walk and move her right arm, but still has some weakness. Neurology was consulted and they recommended Asprin on discharge and Holter monitor with a 4 week follow up out patient. She will be discharged home.     Pertinent Physical Exam At Time of Discharge  Physical Exam  Constitutional:       Appearance: Normal appearance.     Eyes:      Extraocular Movements: Extraocular movements intact.      Conjunctiva/sclera: Conjunctivae normal.      Pupils: Pupils are equal, round, and reactive to light.       Cardiovascular:      Rate and Rhythm: Normal rate and regular rhythm.      Pulses: Normal pulses.      Heart sounds: Normal heart sounds.   Pulmonary:      Effort: Pulmonary effort is normal.      Breath sounds: Normal breath sounds.   Abdominal:      General: Abdomen is flat.      Palpations: Abdomen is soft.     Neurological:      Mental Status: She is alert and oriented to person, place, and  time.      Comments: Improving weakness in right upper and lower extremities.   Psychiatric:         Mood and Affect: Mood normal.         Behavior: Behavior normal.         Thought Content: Thought content normal.         Outpatient Follow-Up  No future appointments.      [facility] DI[facility]

## 2025-08-05 NOTE — CARE PLAN
The patient's goals for the shift include      The clinical goals for the shift include See plan of care      Problem: General Stroke  Goal: Establish a mutual long term goal with patient by discharge  Outcome: Progressing  Goal: Demonstrate improvement in neurological exam throughout the shift  Outcome: Progressing  Goal: Maintain BP within ordered limits throughout shift  Outcome: Progressing  Goal: Participate in treatment (ie., meds, therapy) throughout shift  Outcome: Progressing  Goal: No symptoms of aspiration throughout shift  Outcome: Progressing  Goal: No symptoms of hemorrhage throughout shift  Outcome: Progressing  Goal: Tolerate enteral feeding throughout shift  Outcome: Progressing  Goal: Decreased nausea/vomiting throughout shift  Outcome: Progressing  Goal: Controlled blood glucose throughout shift  Outcome: Progressing  Goal: Out of bed three times today  Outcome: Progressing     Problem: ICU Stroke  Goal: Maintain ICP within ordered limits throughout shift  Outcome: Progressing  Goal: Tolerate EVD clamping trial throughout shift  Outcome: Progressing  Goal: Tolerate ventilator weaning trial during shift  Outcome: Progressing  Goal: Maintain patent airway throughout shift  Outcome: Progressing  Goal: Achieve/maintain targeted sodium level throughout shift  Outcome: Progressing     Problem: Pain - Adult  Goal: Verbalizes/displays adequate comfort level or baseline comfort level  Outcome: Progressing     Problem: Safety - Adult  Goal: Free from fall injury  Outcome: Progressing     Problem: Discharge Planning  Goal: Discharge to home or other facility with appropriate resources  Outcome: Progressing     Problem: Chronic Conditions and Co-morbidities  Goal: Patient's chronic conditions and co-morbidity symptoms are monitored and maintained or improved  Outcome: Progressing     Problem: Nutrition  Goal: Nutrient intake appropriate for maintaining nutritional needs  Outcome: Progressing     Problem:  Fall/Injury  Goal: Not fall by end of shift  Outcome: Progressing  Goal: Be free from injury by end of the shift  Outcome: Progressing  Goal: Verbalize understanding of personal risk factors for fall in the hospital  Outcome: Progressing  Goal: Verbalize understanding of risk factor reduction measures to prevent injury from fall in the home  Outcome: Progressing  Goal: Use assistive devices by end of the shift  Outcome: Progressing  Goal: Pace activities to prevent fatigue by end of the shift  Outcome: Progressing     Problem: Pain  Goal: Takes deep breaths with improved pain control throughout the shift  Outcome: Progressing  Goal: Turns in bed with improved pain control throughout the shift  Outcome: Progressing  Goal: Walks with improved pain control throughout the shift  Outcome: Progressing  Goal: Performs ADL's with improved pain control throughout shift  Outcome: Progressing  Goal: Participates in PT with improved pain control throughout the shift  Outcome: Progressing  Goal: Free from opioid side effects throughout the shift  Outcome: Progressing  Goal: Free from acute confusion related to pain meds throughout the shift  Outcome: Progressing     EOS: Patient had an uneventful night. Patient neuro checks were unchanged. Patient vital signs stable. Patient safety maintained.

## 2025-08-05 NOTE — CARE PLAN
Problem: General Stroke  Goal: Maintain BP within ordered limits throughout shift  Outcome: Met  Goal: Participate in treatment (ie., meds, therapy) throughout shift  Outcome: Met     Problem: Pain - Adult  Goal: Verbalizes/displays adequate comfort level or baseline comfort level  Outcome: Met     Problem: Safety - Adult  Goal: Free from fall injury  Outcome: Met     Problem: Discharge Planning  Goal: Discharge to home or other facility with appropriate resources  Outcome: Met     Problem: Fall/Injury  Goal: Not fall by end of shift  Outcome: Met

## 2025-08-31 LAB
ATRIAL RATE: 59 BPM
P AXIS: 74 DEGREES
P OFFSET: 185 MS
P ONSET: 128 MS
PR INTERVAL: 176 MS
Q ONSET: 216 MS
QRS COUNT: 10 BEATS
QRS DURATION: 82 MS
QT INTERVAL: 426 MS
QTC CALCULATION(BAZETT): 421 MS
QTC FREDERICIA: 423 MS
R AXIS: -48 DEGREES
T AXIS: 48 DEGREES
T OFFSET: 429 MS
VENTRICULAR RATE: 59 BPM